# Patient Record
Sex: FEMALE | Race: WHITE | NOT HISPANIC OR LATINO | Employment: OTHER | URBAN - METROPOLITAN AREA
[De-identification: names, ages, dates, MRNs, and addresses within clinical notes are randomized per-mention and may not be internally consistent; named-entity substitution may affect disease eponyms.]

---

## 2017-01-04 ENCOUNTER — OFFICE VISIT (OUTPATIENT)
Dept: ENDOCRINOLOGY | Age: 77
End: 2017-01-04

## 2017-01-04 VITALS
WEIGHT: 231 LBS | DIASTOLIC BLOOD PRESSURE: 72 MMHG | BODY MASS INDEX: 39.44 KG/M2 | SYSTOLIC BLOOD PRESSURE: 130 MMHG | HEIGHT: 64 IN

## 2017-01-04 DIAGNOSIS — E11.22 TYPE 2 DIABETES MELLITUS WITH STAGE 3 CHRONIC KIDNEY DISEASE, WITH LONG-TERM CURRENT USE OF INSULIN (HCC): Primary | ICD-10-CM

## 2017-01-04 DIAGNOSIS — E78.5 HYPERLIPIDEMIA, UNSPECIFIED HYPERLIPIDEMIA TYPE: ICD-10-CM

## 2017-01-04 DIAGNOSIS — Z79.4 TYPE 2 DIABETES MELLITUS WITH STAGE 3 CHRONIC KIDNEY DISEASE, WITH LONG-TERM CURRENT USE OF INSULIN (HCC): Primary | ICD-10-CM

## 2017-01-04 DIAGNOSIS — E66.01 MORBID OBESITY WITH BMI OF 40.0-44.9, ADULT (HCC): ICD-10-CM

## 2017-01-04 DIAGNOSIS — N18.30 TYPE 2 DIABETES MELLITUS WITH STAGE 3 CHRONIC KIDNEY DISEASE, WITH LONG-TERM CURRENT USE OF INSULIN (HCC): Primary | ICD-10-CM

## 2017-01-04 PROCEDURE — 99214 OFFICE O/P EST MOD 30 MIN: CPT | Performed by: NURSE PRACTITIONER

## 2017-01-04 RX ORDER — MAGNESIUM OXIDE 400 MG/1
400 TABLET ORAL 2 TIMES DAILY
Refills: 11 | COMMUNITY
Start: 2016-12-14 | End: 2018-01-26

## 2017-01-04 RX ORDER — OMEPRAZOLE 40 MG/1
40 CAPSULE, DELAYED RELEASE ORAL DAILY
Refills: 1 | COMMUNITY
Start: 2016-10-27 | End: 2017-09-21 | Stop reason: ALTCHOICE

## 2017-01-04 NOTE — PROGRESS NOTES
"Subjective   Renuka Patel is a 76 y.o. female is here today for follow-up.  Chief Complaint   Patient presents with   • Diabetes     recent labs, did not bring meter, testing BG 4 to 5 times daily   • Hyperlipidemia     patient is taking Toujeo once or twice a week   • Hypertension   • Vitamin D Deficiency     Visit Vitals   • /72   • Ht 64\" (162.6 cm)   • Wt 231 lb (105 kg)   • BMI 39.65 kg/m2       Current Outpatient Prescriptions:   •  acetaminophen (TYLENOL) 325 MG tablet, Take 2 tablets by mouth every 6 (six) hours as needed for mild pain (1-3) or fever., Disp: 30 tablet, Rfl: 0  •  ADVAIR DISKUS 250-50 MCG/DOSE DISKUS, Inhale 1 puff 2 (two) times a day., Disp: , Rfl:   •  allopurinol (ZYLOPRIM) 300 MG tablet, Take 1 tablet by mouth Daily., Disp: , Rfl: 2  •  ALPRAZolam (XANAX) 0.5 MG tablet, Take 1 tablet by mouth as needed. Takes 1-2 tablets, Disp: , Rfl:   •  atorvastatin (LIPITOR) 20 MG tablet, Take 1 tablet by mouth Daily., Disp: 90 tablet, Rfl: 3  •  carvedilol (COREG) 25 MG tablet, Take 1/2 tablet twice daily, Disp: 90 tablet, Rfl: 3  •  colchicine 0.6 MG tablet, Take 1 tablet by mouth As Needed., Disp: , Rfl: 0  •  dabigatran etexilate (PRADAXA) 150 MG capsu, Take 1 capsule by mouth 2 (Two) Times a Day. Lot # [id number]   Exp 5/30/2018, Disp: 60 capsule, Rfl: 0  •  furosemide (LASIX) 20 MG tablet, Take 1 tablet by mouth 3 (Three) Times a Day., Disp: 270 tablet, Rfl: 1  •  glimepiride (AMARYL) 4 MG tablet, Take 1 tablet by mouth 2 (two) times a day., Disp: 180 tablet, Rfl: 3  •  Insulin Glargine 300 UNIT/ML solution pen-injector, Inject 20 Units under the skin Daily., Disp: 12 pen, Rfl: 3  •  Insulin Pen Needle 32G X 4 MM misc, , Disp: , Rfl:   •  magnesium oxide (MAG-OX) 400 MG tablet, Take 400 mg by mouth 2 (Two) Times a Day., Disp: , Rfl: 11  •  Melatonin 10 MG capsule, Take 1 capsule by mouth every night., Disp: , Rfl:   •  nitroglycerin (NITROSTAT) 0.4 MG SL tablet, Place under the tongue., Disp: " , Rfl:   •  omeprazole (priLOSEC) 40 MG capsule, Take 40 mg by mouth Daily., Disp: , Rfl: 1  •  PROAIR RESPICLICK 108 (90 BASE) MCG/ACT inhaler, , Disp: , Rfl:   •  tolnaftate (TINACTIN) 1 % powder, APPLY TO AFFECTED AREA TWICE DAILY, Disp: , Rfl: 0  •  vitamin D (ERGOCALCIFEROL) 99954 UNITS capsule capsule, Take 1 capsule by mouth 1 (one) time per week. (Patient taking differently: Take 50,000 Units by mouth 1 (one) time per week. On Friday), Disp: 13 capsule, Rfl: 3  •  guaiFENesin (MUCINEX) 600 MG 12 hr tablet, Take 2 tablets by mouth 2 (two) times a day. (Patient taking differently: Take 1,200 mg by mouth As Needed.), Disp: 30 tablet, Rfl: 0  •  traZODone (DESYREL) 100 MG tablet, Take 1 tablet by mouth As Needed., Disp: , Rfl: 3  Family History   Problem Relation Age of Onset   • Breast cancer Mother 60   • Breast cancer Sister 50   • Brain cancer Father 38   • Breast cancer Sister 40   • Heart attack Brother    • Diabetes Son    • Hypertension Son      Social History   Substance Use Topics   • Smoking status: Former Smoker     Packs/day: 1.00     Years: 25.00     Quit date: 2004   • Smokeless tobacco: Never Used   • Alcohol use No     Allergies   Allergen Reactions   • Codeine Itching   • Hydralazine    • Hydralazine Hcl    • Lisinopril Cough         History of Present Illness   Encounter Diagnoses   Name Primary?   • Hyperlipidemia, unspecified hyperlipidemia type    • Type 2 diabetes mellitus with stage 3 chronic kidney disease, with long-term current use of insulin Yes   • Morbid obesity with BMI of 40.0-44.9, adult        This is a 76-year-old female patient here today for a routine follow-up visit of the above-mentioned problems.  She had recent labs done which were reviewed.  She is complaining of her highest blood glucose reading in the 290 range.  Today when she woke up it was 90. States holidays have increased her need for insulin.  She states she is use to taking her toujeo insulin twice a week on an  as needed basis she is having problems with affording her medications and states the  Insulin will cost her over $300. Discussed rx assistance. Has [facility] coming to her house.  She states she has a son who is a pharmacist and she has been towed by him that no insulin is covered on her insurance formulary. Cannot afford meds and has applied for drug assistance. Is asking for samples today. Reviewed recent labs and was given a copy. a1c is not at goal. Not taking her insulin on a daily basis. Has appt today to see nephrologist.  I spent considerable time at today's visit educating the patient on the reason that she needs to take her basal insulin on a daily basis and not as needed.  The following portions of the patient's history were reviewed and updated as appropriate: allergies, current medications, past family history, past medical history, past social history, past surgical history and problem list.    Review of Systems   Constitutional: Negative for appetite change.   HENT: Negative for sneezing.    Eyes: Negative for redness.   Respiratory: Negative for cough.    Cardiovascular: Negative for leg swelling.   Gastrointestinal: Negative for anal bleeding.   Endocrine: Negative for polydipsia.   Genitourinary: Negative for menstrual problem.   Musculoskeletal: Negative for myalgias.   Skin: Negative for color change.   Allergic/Immunologic: Negative for food allergies.   Neurological: Negative for seizures.   Hematological: Negative for adenopathy.   Psychiatric/Behavioral: Negative for decreased concentration.       Objective   Physical Exam   Constitutional: She is oriented to person, place, and time. She appears well-developed and well-nourished. No distress.   HENT:   Head: Normocephalic and atraumatic.   Right Ear: External ear normal.   Left Ear: External ear normal.   Nose: Nose normal.   Mouth/Throat: Oropharynx is clear and moist. No oropharyngeal exudate.   Eyes: EOM are normal. Pupils are equal, round, and  reactive to light. Right eye exhibits no discharge. Left eye exhibits no discharge.   Neck: Trachea normal, normal range of motion and full passive range of motion without pain. Neck supple. No tracheal tenderness present. Carotid bruit is not present. No tracheal deviation, no edema and no erythema present. No thyroid mass and no thyromegaly present.   Cardiovascular: Normal rate, normal heart sounds and intact distal pulses.  Exam reveals no gallop and no friction rub.    No murmur heard.  Hx of atrial fib and pacemaker   Pulmonary/Chest: Effort normal and breath sounds normal. No stridor. No respiratory distress. She has no wheezes. She has no rales.   Abdominal: Soft. Bowel sounds are normal. She exhibits no distension.   Musculoskeletal: She exhibits no edema or deformity.   Slow and unsteady gait uses a cane to assist with walking   Lymphadenopathy:     She has no cervical adenopathy.   Neurological: She is alert and oriented to person, place, and time.   Skin: Skin is warm and dry. No rash noted. She is not diaphoretic. No erythema. No pallor.   Psychiatric: She has a normal mood and affect. Her behavior is normal. Judgment and thought content normal.   Nursing note and vitals reviewed.      Results for orders placed or performed in visit on 12/13/16   Ferritin   Result Value Ref Range    Ferritin 98.90 ng/mL   Iron profile   Result Value Ref Range    Iron 52 37 - 145 mcg/dL    Iron Saturation 17 14 - 48 %    Transferrin 222 200 - 360 mg/dL    TIBC 311 249 - 505 mcg/dL   CBC Auto Differential   Result Value Ref Range    WBC 7.41 4.00 - 10.00 10*3/mm3    RBC 3.69 (L) 3.90 - 5.00 10*6/mm3    Hemoglobin 11.1 (L) 11.5 - 14.9 g/dL    Hematocrit 35.7 34.0 - 45.0 %    MCV 96.7 83.0 - 97.0 fL    MCH 30.1 27.0 - 33.0 pg    MCHC 31.1 (L) 32.0 - 35.0 g/dL    RDW 16.1 (H) 11.7 - 14.5 %    RDW-SD 57.3 (H) 37.0 - 49.0 fl    MPV 10.8 8.9 - 12.1 fL    Platelets 150 150 - 375 10*3/mm3    Neutrophil % 71.3 39.0 - 75.0 %     Lymphocyte % 16.5 (L) 20.0 - 49.0 %    Monocyte % 7.6 4.0 - 12.0 %    Eosinophil % 3.5 1.0 - 5.0 %    Basophil % 0.7 0.0 - 1.1 %    Immature Grans % 0.4 0.0 - 0.5 %    Neutrophils, Absolute 5.29 1.50 - 7.00 10*3/mm3    Lymphocytes, Absolute 1.22 1.00 - 3.50 10*3/mm3    Monocytes, Absolute 0.56 0.25 - 0.80 10*3/mm3    Eosinophils, Absolute 0.26 0.00 - 0.36 10*3/mm3    Basophils, Absolute 0.05 0.00 - 0.10 10*3/mm3    Immature Grans, Absolute 0.03 0.00 - 0.03 10*3/mm3    nRBC 0.0 0.0 - 0.0 /100 WBC   Gold Top - SST   Result Value Ref Range    Extra Tube Hold for add-ons.        Assessment/Plan   Renuka was seen today for diabetes, hyperlipidemia, hypertension and vitamin d deficiency.    Diagnoses and all orders for this visit:    Type 2 diabetes mellitus with stage 3 chronic kidney disease, with long-term current use of insulin    Hyperlipidemia, unspecified hyperlipidemia type    Morbid obesity with BMI of 40.0-44.9, adult      In summary, patient was seen and examined.  I have changed her toujeo insulin to 20 units once daily instead of taking it on a when necessary basis.  She is to continue all her other medications as prescribed.  She is to follow-up with her nephrologist for her kidney disease in which she has an appointment today.  She was given some samples of toujeo and a written prescription to some into the company for prescription assistance.  She is to follow-up with Dr. Balbuena her next appointment with labs prior.  I've encouraged her to contact the office should she have any problems with her blood sugars understand to our for dropped below 70.

## 2017-01-13 RX ORDER — FUROSEMIDE 20 MG/1
20 TABLET ORAL 3 TIMES DAILY
Qty: 270 TABLET | Refills: 1 | Status: SHIPPED | OUTPATIENT
Start: 2017-01-13 | End: 2017-03-22 | Stop reason: SDUPTHER

## 2017-01-13 RX ORDER — CARVEDILOL 25 MG/1
TABLET ORAL
Qty: 135 TABLET | Refills: 3 | Status: SHIPPED | OUTPATIENT
Start: 2017-01-13 | End: 2017-09-18 | Stop reason: SDUPTHER

## 2017-01-20 ENCOUNTER — CLINICAL SUPPORT NO REQUIREMENTS (OUTPATIENT)
Dept: CARDIOLOGY | Facility: CLINIC | Age: 77
End: 2017-01-20

## 2017-01-20 DIAGNOSIS — I48.91 ATRIAL FIBRILLATION, UNSPECIFIED TYPE (HCC): Primary | ICD-10-CM

## 2017-01-20 PROCEDURE — 93294 REM INTERROG EVL PM/LDLS PM: CPT | Performed by: INTERNAL MEDICINE

## 2017-01-20 PROCEDURE — 93296 REM INTERROG EVL PM/IDS: CPT | Performed by: INTERNAL MEDICINE

## 2017-02-02 RX ORDER — DOXYCYCLINE 50 MG/1
TABLET ORAL
COMMUNITY
End: 2017-03-21

## 2017-02-02 RX ORDER — NITROGLYCERIN 0.4 MG/1
TABLET SUBLINGUAL
COMMUNITY
End: 2017-03-21 | Stop reason: SDUPTHER

## 2017-02-02 RX ORDER — OMEPRAZOLE 40 MG/1
CAPSULE, DELAYED RELEASE ORAL
COMMUNITY
End: 2017-03-21 | Stop reason: SDUPTHER

## 2017-02-02 RX ORDER — ALPRAZOLAM 0.5 MG/1
TABLET ORAL
COMMUNITY
End: 2017-02-09

## 2017-02-02 RX ORDER — DABIGATRAN ETEXILATE 150 MG/1
CAPSULE ORAL
COMMUNITY
End: 2017-02-09

## 2017-02-02 RX ORDER — MAGNESIUM OXIDE 400 MG/1
TABLET ORAL
COMMUNITY
End: 2017-02-09

## 2017-02-02 RX ORDER — DIPHENHYDRAMINE HCL 25 MG
TABLET ORAL
COMMUNITY
End: 2017-02-09

## 2017-02-02 RX ORDER — ACETAMINOPHEN 500 MG
TABLET ORAL
COMMUNITY
End: 2017-02-09

## 2017-02-07 ENCOUNTER — TELEPHONE (OUTPATIENT)
Dept: ENDOCRINOLOGY | Age: 77
End: 2017-02-07

## 2017-02-07 RX ORDER — DABIGATRAN ETEXILATE 150 MG/1
150 CAPSULE ORAL 2 TIMES DAILY
Qty: 60 CAPSULE | Refills: 0 | COMMUNITY
Start: 2017-02-07 | End: 2017-05-12 | Stop reason: SDUPTHER

## 2017-02-07 NOTE — TELEPHONE ENCOUNTER
----- Message from Janna Urbina sent at 2/7/2017 12:56 PM EST -----  Contact: patient  Patient is requesting samples of troujeo, could you give her a call when she can pick these up, she would like to pick these up Thursday    Gave 3 sample pens 02/07/2017

## 2017-02-09 ENCOUNTER — OFFICE VISIT (OUTPATIENT)
Dept: ONCOLOGY | Facility: CLINIC | Age: 77
End: 2017-02-09

## 2017-02-09 ENCOUNTER — LAB (OUTPATIENT)
Dept: LAB | Facility: HOSPITAL | Age: 77
End: 2017-02-09
Attending: INTERNAL MEDICINE

## 2017-02-09 ENCOUNTER — APPOINTMENT (OUTPATIENT)
Dept: ONCOLOGY | Facility: HOSPITAL | Age: 77
End: 2017-02-09

## 2017-02-09 VITALS
WEIGHT: 233.3 LBS | DIASTOLIC BLOOD PRESSURE: 84 MMHG | TEMPERATURE: 98.1 F | HEIGHT: 64 IN | OXYGEN SATURATION: 94 % | SYSTOLIC BLOOD PRESSURE: 130 MMHG | BODY MASS INDEX: 39.83 KG/M2 | HEART RATE: 79 BPM | RESPIRATION RATE: 14 BRPM

## 2017-02-09 DIAGNOSIS — D50.8 IRON DEFICIENCY ANEMIA REFRACTORY TO IRON THERAPY: Primary | ICD-10-CM

## 2017-02-09 DIAGNOSIS — N18.30 ANEMIA DUE TO STAGE 3 CHRONIC KIDNEY DISEASE TREATED WITH ERYTHROPOIETIN (HCC): ICD-10-CM

## 2017-02-09 DIAGNOSIS — D63.1 ANEMIA DUE TO STAGE 3 CHRONIC KIDNEY DISEASE TREATED WITH ERYTHROPOIETIN (HCC): ICD-10-CM

## 2017-02-09 DIAGNOSIS — D50.8 IRON DEFICIENCY ANEMIA REFRACTORY TO IRON THERAPY: ICD-10-CM

## 2017-02-09 LAB
BASOPHILS # BLD AUTO: 0.04 10*3/MM3 (ref 0–0.1)
BASOPHILS NFR BLD AUTO: 0.4 % (ref 0–1.1)
DEPRECATED RDW RBC AUTO: 54.4 FL (ref 37–49)
EOSINOPHIL # BLD AUTO: 0.17 10*3/MM3 (ref 0–0.36)
EOSINOPHIL NFR BLD AUTO: 1.6 % (ref 1–5)
ERYTHROCYTE [DISTWIDTH] IN BLOOD BY AUTOMATED COUNT: 15.3 % (ref 11.7–14.5)
FERRITIN SERPL-MCNC: 93.1 NG/ML
HCT VFR BLD AUTO: 36.1 % (ref 34–45)
HGB BLD-MCNC: 11.3 G/DL (ref 11.5–14.9)
IMM GRANULOCYTES # BLD: 0.04 10*3/MM3 (ref 0–0.03)
IMM GRANULOCYTES NFR BLD: 0.4 % (ref 0–0.5)
IRON 24H UR-MRATE: 67 MCG/DL (ref 37–145)
IRON SATN MFR SERPL: 21 % (ref 14–48)
LYMPHOCYTES # BLD AUTO: 1.25 10*3/MM3 (ref 1–3.5)
LYMPHOCYTES NFR BLD AUTO: 11.5 % (ref 20–49)
MCH RBC QN AUTO: 30.5 PG (ref 27–33)
MCHC RBC AUTO-ENTMCNC: 31.3 G/DL (ref 32–35)
MCV RBC AUTO: 97.3 FL (ref 83–97)
MONOCYTES # BLD AUTO: 0.59 10*3/MM3 (ref 0.25–0.8)
MONOCYTES NFR BLD AUTO: 5.4 % (ref 4–12)
NEUTROPHILS # BLD AUTO: 8.77 10*3/MM3 (ref 1.5–7)
NEUTROPHILS NFR BLD AUTO: 80.7 % (ref 39–75)
NRBC BLD MANUAL-RTO: 0 /100 WBC (ref 0–0)
PLATELET # BLD AUTO: 152 10*3/MM3 (ref 150–375)
PMV BLD AUTO: 10.5 FL (ref 8.9–12.1)
RBC # BLD AUTO: 3.71 10*6/MM3 (ref 3.9–5)
TIBC SERPL-MCNC: 315 MCG/DL (ref 249–505)
TRANSFERRIN SERPL-MCNC: 225 MG/DL (ref 200–360)
WBC NRBC COR # BLD: 10.86 10*3/MM3 (ref 4–10)

## 2017-02-09 PROCEDURE — 85025 COMPLETE CBC W/AUTO DIFF WBC: CPT

## 2017-02-09 PROCEDURE — 99214 OFFICE O/P EST MOD 30 MIN: CPT | Performed by: INTERNAL MEDICINE

## 2017-02-09 PROCEDURE — 36415 COLL VENOUS BLD VENIPUNCTURE: CPT

## 2017-02-09 PROCEDURE — 83540 ASSAY OF IRON: CPT

## 2017-02-09 PROCEDURE — 82728 ASSAY OF FERRITIN: CPT

## 2017-02-09 PROCEDURE — 84466 ASSAY OF TRANSFERRIN: CPT

## 2017-02-09 NOTE — PROGRESS NOTES
Subjective .     REASONS FOR FOLLOWUP:  Anemia, cancer    HISTORY OF PRESENT ILLNESS:  The patient is a 76 y.o. year old female  who is here for follow-up with the above-mentioned history.  Denies chest pain.  Denies shortness of air.  No change in baseline dizziness.  Denies weakness.  Denies bleeding from any location.  Denies nausea   Denies weight loss.  Denies pain.  Denies hematochezia or melena.    Denies any problems with bowel movements.        Past Medical History   Diagnosis Date   • Abdominal aortic aneurysm    • Acute combined systolic and diastolic congestive heart failure    • Anemia      Secondary to stage 3 chronic kidney disease.   • Anxiety    • Aortic aneurysm    • Atrial fibrillation    • Back pain    • Breast cancer      Stage I, status post lumpectomy   • CAD (coronary artery disease)    • Chronic kidney disease    • Colon cancer      Stage I, resected 01/27/2014   • Congestive heart failure    • COPD (chronic obstructive pulmonary disease)    • Coronary artery disease    • Diabetes mellitus      type 2   • Esophageal reflux    • Heart attack    • Hypertension    • Iron deficiency anemia secondary to inadequate dietary iron intake    • Joint pain      IN THE RIGHT KNEE   • Mass of adrenal gland      lesion solitary, CT thereof   • Morbid obesity    • Neoplasm of adrenal gland    • Obstructive sleep apnea    • HAYDEN on CPAP    • Osteoarthritis    • Pneumonia      onset date: 01 Mar 2011, Severe pneumonia w mechanical ventilation, [facility] at [facility]   • Radiation    • Sick sinus syndrome    • Strong family history of breast cancer    • Type 2 diabetes mellitus    • Vitamin D deficiency        HEMATOLOGIC/ONCOLOGIC HISTORY:  (History from previous dates can be found in the separate document.)    MEDICATIONS    Current Outpatient Prescriptions:   •  ADVAIR DISKUS 250-50 MCG/DOSE DISKUS, Inhale 1 puff 2 (two) times a day., Disp: , Rfl:   •  allopurinol (ZYLOPRIM) 300 MG tablet, Take 1  tablet by mouth Daily., Disp: , Rfl: 2  •  ALPRAZolam (XANAX) 0.5 MG tablet, Take 1 tablet by mouth as needed. Takes 1-2 tablets, Disp: , Rfl:   •  atorvastatin (LIPITOR) 20 MG tablet, Take 1 tablet by mouth Daily., Disp: 90 tablet, Rfl: 3  •  carvedilol (COREG) 25 MG tablet, Take 1/2 tablet twice a day.Take an extra 1/2 when needed., Disp: 135 tablet, Rfl: 3  •  colchicine 0.6 MG tablet, Take 1 tablet by mouth As Needed., Disp: , Rfl: 0  •  dabigatran etexilate (PRADAXA) 150 MG capsu, Take 1 capsule by mouth 2 (Two) Times a Day. Lot # [id number]   Exp 5/30/2018, Disp: 60 capsule, Rfl: 0  •  doxycycline (ADOXA) 50 MG tablet, QD, Disp: , Rfl:   •  furosemide (LASIX) 20 MG tablet, Take 1 tablet by mouth 3 (Three) Times a Day., Disp: 270 tablet, Rfl: 1  •  glimepiride (AMARYL) 4 MG tablet, Take 1 tablet by mouth 2 (two) times a day., Disp: 180 tablet, Rfl: 3  •  guaiFENesin (MUCINEX) 600 MG 12 hr tablet, Take 2 tablets by mouth 2 (two) times a day. (Patient taking differently: Take 1,200 mg by mouth As Needed.), Disp: 30 tablet, Rfl: 0  •  Insulin Glargine 300 UNIT/ML solution pen-injector, Inject 20 Units under the skin Daily., Disp: 12 pen, Rfl: 3  •  Insulin Pen Needle 32G X 4 MM misc, , Disp: , Rfl:   •  magnesium oxide (MAG-OX) 400 MG tablet, Take 400 mg by mouth 2 (Two) Times a Day., Disp: , Rfl: 11  •  Melatonin 10 MG capsule, Take 1 capsule by mouth every night., Disp: , Rfl:   •  Multiple Vitamins-Minerals (MULTIVITAMIN WOMEN 50+ PO), QD, Disp: , Rfl:   •  nitroglycerin (NITROSTAT) 0.4 MG SL tablet, Place under the tongue., Disp: , Rfl:   •  nitroglycerin (NITROSTAT) 0.4 MG SL tablet, prn, Disp: , Rfl:   •  omeprazole (priLOSEC) 40 MG capsule, Take 40 mg by mouth Daily., Disp: , Rfl: 1  •  omeprazole (priLOSEC) 40 MG capsule, qd, Disp: , Rfl:   •  PROAIR RESPICLICK 108 (90 BASE) MCG/ACT inhaler, , Disp: , Rfl:   •  tolnaftate (TINACTIN) 1 % powder, APPLY TO AFFECTED AREA TWICE DAILY, Disp: , Rfl: 0  •  vitamin D  (ERGOCALCIFEROL) 30841 UNITS capsule capsule, Take 1 capsule by mouth 1 (one) time per week. (Patient taking differently: Take 50,000 Units by mouth 1 (one) time per week. On Friday), Disp: 13 capsule, Rfl: 3    ALLERGIES:     Allergies   Allergen Reactions   • Codeine Itching     Edema   • Hydralazine Hcl      BP drops.   • Lisinopril Cough     Edema       SOCIAL HISTORY:       Social History     Social History   • Marital status:      Spouse name: N/A   • Number of children: N/A   • Years of education: N/A     Occupational History   • Retired      Social History Main Topics   • Smoking status: Former Smoker     Packs/day: 1.00     Years: 25.00     Quit date: 2004   • Smokeless tobacco: Never Used   • Alcohol use No   • Drug use: No   • Sexual activity: Defer     Other Topics Concern   • Not on file     Social History Narrative         FAMILY HISTORY:  Family History   Problem Relation Age of Onset   • Breast cancer Mother 60   • Breast cancer Sister 50   • Brain cancer Father 38   • Breast cancer Sister 40   • Heart attack Brother    • Diabetes Son    • Hypertension Son        REVIEW OF SYSTEMS:  GENERAL: No change in appetite or weight;   No fevers, chills, sweats.    SKIN: No nonhealing lesions.   No rashes.  HEME/LYMPH: No easy bruising, bleeding.   No swollen nodes.   EYES: No vision changes or diplopia.   ENT: No tinnitus, hearing loss, gum bleeding, epistaxis, hoarseness or dysphagia.   RESPIRATORY: No cough, shortness of breath, hemoptysis or wheezing.   CVS: No chest pain, palpitations, orthopnea, dyspnea on exertion or PND.   GI: No melena or hematochezia.   No abdominal pain.  No nausea, vomiting, constipation, diarrhea  : No lower tract obstructive symptoms, dysuria or hematuria.   MUSCULOSKELETAL: No bone pain.  No joint stiffness.   NEUROLOGICAL: see HPI   PSYCHIATRIC: No increased nervousness, mood changes or depression.         Objective    Vitals:    02/09/17 1033   BP: 130/84   Pulse: 79  "  Resp: 14   Temp: 98.1 °F (36.7 °C)   TempSrc: Oral   SpO2: 94%   Weight: 233 lb 4.8 oz (106 kg)   Height: 64\" (162.6 cm)   PainSc:   4   PainLoc: Knee     Current Status 2/9/2017   ECOG score 0      PHYSICAL EXAM:    GENERAL:  Well-developed, well-nourished in no acute distress.   SKIN:  Warm, dry without rashes, purpura or petechiae.  HEAD:  Normocephalic.  EYES:  Pupils equal, round and reactive to light.  EOMs intact.  Conjunctivae normal.  EARS:  Hearing intact.  NOSE:  Septum midline.  No excoriations or nasal discharge.  MOUTH:  Tongue is well-papillated; no stomatitis or ulcers.  Lips normal.  THROAT:  Oropharynx without lesions or exudates.  NECK:  Supple with good range of motion; no thyromegaly or masses, no JVD.  LYMPHATICS:  No cervical, supraclavicular, axillary or inguinal adenopathy.  CHEST:  Lungs clear to percussion and auscultation. Good airflow.  Declines breast exam  CARDIAC:  Regular rate and rhythm without murmurs, rubs or gallops. Normal S1,S2.  BREAST: Patient declined as she states she is up-to-date on breast exams through her PCP.  ABDOMEN:  Soft, nontender with no organomegaly or masses.  EXTREMITIES:  No clubbing, cyanosis or edema.  NEUROLOGICAL:  Cranial Nerves II-XII grossly intact.  No focal neurological deficits.  PSYCHIATRIC:  Normal affect and mood.      RECENT LABS:        WBC   Date/Time Value Ref Range Status   02/09/2017 10:29 AM 10.86 (H) 4.00 - 10.00 10*3/mm3 Final   12/21/2015 05:07 PM 9.72 4.50 - 10.70 K/Cumm Final     HEMOGLOBIN   Date/Time Value Ref Range Status   02/09/2017 10:29 AM 11.3 (L) 11.5 - 14.9 g/dL Final   12/21/2015 05:07 PM 10.2 (L) 11.9 - 15.5 g/dL Final     PLATELETS   Date/Time Value Ref Range Status   02/09/2017 10:29  150 - 375 10*3/mm3 Final   12/21/2015 05:07  140 - 500 K/Cumm Final       Assessment/Plan   Iron deficiency anemia refractory to iron therapy  - CBC & Differential  - CBC & Differential  - CBC & Differential  - Ferritin  - " Iron Profile  - Ferritin  - Iron Profile  - Ferritin  - Iron Profile    Anemia due to stage 3 chronic kidney disease treated with erythropoietin  - CBC & Differential  - CBC & Differential  - CBC & Differential  - Ferritin  - Iron Profile  - Ferritin  - Iron Profile  - Ferritin  - Iron Profile      ASSESSMENT:  1.  Iron deficiency anemia. Intolerant and does not respond to oral iron, has used Feraheme. She follows with Dr. Gautam   Recent iron studies normal.    2. Anemia secondary to stage 3 chronic kidney disease. Procrit started 11/03/2015.   Has not needed Procrit recently.    3. Stage I breast cancer. She completed 5 years of tamoxifen in 2005.   Last mammogram 08/18/2015, BIRADS 2.     4. Stage I colon adenocarcinom a, resected 01/27/2014. No adjuvant chemotherapy indicated. Follows with Dr. Gautam regularly.     5. Difficult IV stick. She states she has lots of issues with this. I told her at some point she may need a port to have access for IV infusions and blood trans fusions. We discussed some of the risks with this. For now, we will hold off, but if the IV sticks become more difficult, we may need to reconsider this.     6.  Intermittent yeast infection at the folds of her skin under her breasts bilaterally, intermittently. She thinks nystatin cream works better than nystatin powder. She uses Desitin as well.     7. Chronic kidney disease. Creatinine 0.8 and 1.2 on lab checks from 2013. Creatinine was noted to be 1.8 on 10/08/2015 and 11/03/2015. She is now seeing Dr. Isha Kraus of Nephrology for further management of this as well as her hyperkalemia and blood pressure issues.     PLAN:   · CBC, iron labs q 2 months  · Procrit if Hb < 10.   · M.D./labs/Procrit 6 months   · Defer further BMP assessments to her other providers.   · Last mammogram 8/18/16, BIRADS 1.

## 2017-02-16 RX ORDER — NITROGLYCERIN 0.4 MG/1
0.4 TABLET SUBLINGUAL
Qty: 20 TABLET | Refills: 0 | Status: SHIPPED | OUTPATIENT
Start: 2017-02-16 | End: 2018-02-28 | Stop reason: SDUPTHER

## 2017-03-21 ENCOUNTER — CLINICAL SUPPORT NO REQUIREMENTS (OUTPATIENT)
Dept: CARDIOLOGY | Facility: CLINIC | Age: 77
End: 2017-03-21

## 2017-03-21 ENCOUNTER — OFFICE VISIT (OUTPATIENT)
Dept: CARDIOLOGY | Facility: CLINIC | Age: 77
End: 2017-03-21

## 2017-03-21 VITALS
SYSTOLIC BLOOD PRESSURE: 112 MMHG | HEART RATE: 69 BPM | DIASTOLIC BLOOD PRESSURE: 78 MMHG | BODY MASS INDEX: 40.29 KG/M2 | WEIGHT: 236 LBS | HEIGHT: 64 IN

## 2017-03-21 DIAGNOSIS — I42.9 CARDIOMYOPATHY (HCC): ICD-10-CM

## 2017-03-21 DIAGNOSIS — I34.0 NON-RHEUMATIC MITRAL REGURGITATION: ICD-10-CM

## 2017-03-21 DIAGNOSIS — I48.21 PERMANENT ATRIAL FIBRILLATION (HCC): Primary | ICD-10-CM

## 2017-03-21 DIAGNOSIS — I48.21 PERMANENT ATRIAL FIBRILLATION (HCC): ICD-10-CM

## 2017-03-21 DIAGNOSIS — I10 ESSENTIAL HYPERTENSION: ICD-10-CM

## 2017-03-21 DIAGNOSIS — G47.33 OSA (OBSTRUCTIVE SLEEP APNEA): ICD-10-CM

## 2017-03-21 DIAGNOSIS — I25.10 CHRONIC CORONARY ARTERY DISEASE: Primary | ICD-10-CM

## 2017-03-21 DIAGNOSIS — E78.5 HYPERLIPIDEMIA, UNSPECIFIED HYPERLIPIDEMIA TYPE: ICD-10-CM

## 2017-03-21 DIAGNOSIS — J44.9 CHRONIC OBSTRUCTIVE PULMONARY DISEASE, UNSPECIFIED COPD TYPE (HCC): ICD-10-CM

## 2017-03-21 PROCEDURE — 99214 OFFICE O/P EST MOD 30 MIN: CPT | Performed by: INTERNAL MEDICINE

## 2017-03-21 PROCEDURE — 93279 PRGRMG DEV EVAL PM/LDLS PM: CPT | Performed by: INTERNAL MEDICINE

## 2017-03-21 NOTE — PROGRESS NOTES
Date of Office Visit: 2017  Encounter Provider: Rhiannon Rios MD  Place of Service: [facility] CARDIOLOGY  Patient Name: Renuka Patel  :1940      Patient ID:  Renuka Patel is a 76 y.o. female is here for  followup for CAD and CMP and a fib.         History of Present Illness    I first saw her in 2010 for atrial fibrillation which was new  onset. We attempted a cardioversion but she developed severe bradycardia  after that, and required a pacemaker implantation which was performed on  2011. The second attempt to cardiovert her in 2011 was unsuccessful.    She has been on Coumadin and rate controlled since that time.    She was admitted to [facility] for acute respiratory  failure on 2011 requiring mechanical ventilation secondary to  severe pneumonia. She went into congestive heart failure and atrial  fibrillation with rapid ventricular response. The hospitalization was long  and complex.       After she recovered she had a stress study to rule out ischemia as  a reason for her heart failure. This was performed on 2011 and  was a two day nuclear myocardial perfusion study and was normal. She had an  echocardiogram performed on 2012 which showed normal left  ventricular systolic function, severe biatrial enlargement, right  ventricular enlargement, mild mitral insufficiency, and indeterminate  diastolic dysfunction.       I then saw her in the spring of 2012. She was having episodes of dyspnea on  exertion as well as lightheadedness and a lot of fatigue. As it turns out,  by interrogating her pacemaker, we found episodes of atrial fibrillation  with rapid ventricular response. She underwent AV node ablation for atrial  fibrillation with rapid ventricular response on 2012 with Dr. Bear Rodrigues and she did great after that. She became ill however; and  presented to [facility] on  07/21/2012. She had  accelerated hypertension and pulmonary edema. She was diuresed and her  blood pressure medications were adjusted.       During her hospitalization in 2012, she lost 15 pounds. She had a CT scan performed  which showed a left adrenal mass at 3.5 cm and a small abdominal aortic  aneurysm which was infrarenal and measured 3.3 cm. She was seen by  endocrinology, Dr. Felton, and cardiology while she was there. She had an  echocardiogram performed on 07/20/2012 which showed her ejection fraction  moderately reduced at 30-35% with septal akinesis, inferior wall akinesis,  left atrium was mildly dilated, the right atrium was mildly dilated, mild  mitral insufficiency and mild tricuspid insufficiency. This was clearly a  change from her prior echocardiogram.           On 11/12/2013, she had a colonoscopy with Dr. Gautam, which showed an ulcerated mass in the  sigmoid colon, which turned out to be positive for colon cancer and now she is scheduled  to see Dr. Nathan Bernal the second week in December. After her colonoscopy then, on  11/17/2013, she presented to [facility] with prolonged shoulder  discomfort and wound up ruling in for a non-ST elevation myocardial infarction. She went  on for cardiac catheterization with Dr. Mayfield. This showed normal left main coronary  artery, normal non-dominant left circumflex vessel, 99% proximal LAD stenosis going into  the mid-LAD involving the origin of the second diagonal branch with a 75% ostial second  diagonal stenosis. The first diagonal branch was normal. The right coronary artery showed  50% at the posterolateral arteries. Her ejection fraction was reduced at 35-40%. She went  on to receive a 2.25 x 15 mm drug-eluting stent to the second diagonal branch of the left  anterior descending artery and then she received a 2.75 x 18 mm long Xience drug-eluting  stent into the midleft anterior descending artery. Then she received a 3.5 x 23 mm  long  Xience drug-eluting stent in the proximal left anterior descending artery. Afterwards, she  did well and was able to be discharged, but in the meantime, she still has this diagnosis  of colon cancer and she is on three anticoagulation medications; aspirin, Effient, and  Pradaxa.       She had an echocardiogram on 2013 which revealed an ejection  fraction of 53%, apical wall hypokinesis and moderate mitral insufficiency,  moderate-to-severe tricuspid insufficiency and grade III diastolic dysfunction. She also  had a PET stress study done on 2013 which revealed a moderate area of myocardial  infarction in the apical wall with no ischemia. There was apical akinesis and the  ejection fraction was 36%. She had a CT of the abdomen and pelvis on 2014 showing  no metastatic disease, sigmoid thickening with malignancy there and a 3.6 cm infrarenal  abdominal aortic aneurysm. She has an adrenal adenoma which really is unchanged.    Her  Claudio  around ma2013 and this has been very  difficult for her.       She is status post her colon surgery which was done for colon cancer with Dr. Bernal 2014.  At a semiurgent visit on 2014, she was  short-winded and had edema. It turned out she was quite anemic, and her blood pressure  was low so we decreased her Diovan to 80 mg daily and stopped her Effient.       She saw Dr. Reza for her sleep apnea. They  decreased the pressures on her CPAP mask so she is able to wear it for more than three  hours a night.      She was admitted on 2014, with heart  failure and hypertensive emergency. She was diuresed and did much better. She had a  chest x-ray during the hospitalization, which did show heart failure. She had a 2-D  echocardiogram with Doppler showing an ejection fraction of 40%-45%, apical and mid distal  anterior wall severe hypokinesis, moderate to severe right atrial dilation, severe left  atrial dilation, mild aortic  insufficiency, moderate mitral insufficiency, right  ventricular systolic pressure of 47 mmHg, and moderate tricuspid insufficiency.       She has developed an abdominal wall hernia but it is not causing her any pain, so she is going to wear a binder for now.  The patient was admitted to the hospital at [facility] from 11/07/2014 to  11/12/2014 with acute congestive heart failure. This was systolic in nature. Her blood  pressure was elevated. She was hypoxic. She required BiPAP and diuretics. After  adjusting her medications for her blood pressure and using diuretics she came back to  Baseline.      She had an echocardiogram last done in 04/2015. This showed right ventricular systolic pressure  elevated at 54 mmHg, mild tricuspid insufficiency, grade 2 diastolic dysfunction, moderate  left ventricular dilation, mild concentric left ventricular hypertrophy, ejection fraction  of 54% with basilar lateral and basilar anterior akinesis and aneurysm. There was severe  biatrial enlargement and mild to moderate mitral insufficiency.       She was admitted on 08/24/2016, with acute decompensating congestive heart failure, both systolic and diastolic in nature. She had an echocardiogram, which showed her ejection fraction had dropped to 32%. She was treated with IV diuretics. She was very weak and so physical therapy was also consulted on the case. She ruled out for myocardial infarction but did not have a stress study done during the hospitalization because she needed a PET stress study and the PET camera was in repair.     She had a PET stress test done 09/2016 showing ejection fraction of 37% and no evidence of ischemia.      The patient is here today and she is overall doing well.  She has some chronic fatigue due to anemia and some lightheadedness when she takes her Lasix, which she thinks is multifactorial.  She has had some sinus drainage and is on Allegra D.  Her weight has not changed.  She is not  exercising because of right knee pain.  She has no exertional chest tightness or pressure.  She has no difficulty breathing.  She has no lower extremity edema and nothing that sounds like orthopnea or paroxysmal nocturnal dyspnea.  Her blood pressure usually chronically remains on the low side.  She has had no falls or syncope.  Her pacemaker check today shows that her pacemaker is functioning well.          Past Medical History   Diagnosis Date   • Abdominal aortic aneurysm    • Acute combined systolic and diastolic congestive heart failure    • Anemia      Secondary to stage 3 chronic kidney disease.   • Anxiety    • Aortic aneurysm    • Atrial fibrillation    • Back pain    • Breast cancer      Stage I, status post lumpectomy   • CAD (coronary artery disease)    • Cardiomyopathy    • CHF (congestive heart failure)    • Chronic kidney disease    • Colon cancer      Stage I, resected 01/27/2014   • Common cold    • Congestive heart failure    • COPD (chronic obstructive pulmonary disease)    • Coronary artery disease    • Diabetes mellitus      type 2   • Esophageal reflux    • Heart attack    • Hyperlipidemia    • Hypertension    • Iron deficiency anemia secondary to inadequate dietary iron intake    • Joint pain      IN THE RIGHT KNEE   • Mass of adrenal gland      lesion solitary, CT thereof   • Morbid obesity    • Neoplasm of adrenal gland    • Obstructive sleep apnea    • HAYDEN on CPAP    • Osteoarthritis    • Pneumonia      onset date: 01 Mar 2011, Severe pneumonia w mechanical ventilation, treated at [facility]. Patricia Britton   • Radiation    • Sick sinus syndrome    • Strong family history of breast cancer    • Type 2 diabetes mellitus    • Vitamin D deficiency          Past Surgical History   Procedure Laterality Date   • Hysterectomy     • Back surgery     • Hand surgery     • Cardiac pacemaker placement     • Breast surgery     • Breast lumpectomy     • Breast biopsy     • Tibia fracture surgery       left   •  Toe surgery       bilat all toenails removed x3 r/t fungus   • Colon surgery  01/27/2014     resection for colon cancer       Current Outpatient Prescriptions on File Prior to Visit   Medication Sig Dispense Refill   • ADVAIR DISKUS 250-50 MCG/DOSE DISKUS Inhale 1 puff 2 (two) times a day.     • allopurinol (ZYLOPRIM) 300 MG tablet Take 1 tablet by mouth Daily.  2   • ALPRAZolam (XANAX) 0.5 MG tablet Take 1 tablet by mouth as needed. Takes 1-2 tablets     • atorvastatin (LIPITOR) 20 MG tablet Take 1 tablet by mouth Daily. 90 tablet 3   • carvedilol (COREG) 25 MG tablet Take 1/2 tablet twice a day.Take an extra 1/2 when needed. 135 tablet 3   • colchicine 0.6 MG tablet Take 1 tablet by mouth As Needed.  0   • dabigatran etexilate (PRADAXA) 150 MG capsu Take 1 capsule by mouth 2 (Two) Times a Day. Lot # [id number]   Exp 5/30/2018 60 capsule 0   • furosemide (LASIX) 20 MG tablet Take 1 tablet by mouth 3 (Three) Times a Day. (Patient taking differently: Take 20 mg by mouth. Take 2 po in the am and 3 po in the pm) 270 tablet 1   • glimepiride (AMARYL) 4 MG tablet Take 1 tablet by mouth 2 (two) times a day. 180 tablet 3   • Insulin Glargine 300 UNIT/ML solution pen-injector Inject 20 Units under the skin Daily. 12 pen 3   • Insulin Pen Needle 32G X 4 MM misc      • magnesium oxide (MAG-OX) 400 MG tablet Take 400 mg by mouth 2 (Two) Times a Day.  11   • Melatonin 10 MG capsule Take 1 capsule by mouth every night.     • Multiple Vitamins-Minerals (MULTIVITAMIN WOMEN 50+ PO) QD     • nitroglycerin (NITROSTAT) 0.4 MG SL tablet Place 1 tablet under the tongue Every 5 (Five) Minutes As Needed for chest pain. 20 tablet 0   • omeprazole (priLOSEC) 40 MG capsule Take 40 mg by mouth Daily.  1   • PROAIR RESPICLICK 108 (90 BASE) MCG/ACT inhaler Every 6 (Six) Hours As Needed.     • tolnaftate (TINACTIN) 1 % powder APPLY TO AFFECTED AREA TWICE DAILY as needed  0   • [DISCONTINUED] doxycycline (ADOXA) 50 MG tablet QD     • [DISCONTINUED]  guaiFENesin (MUCINEX) 600 MG 12 hr tablet Take 2 tablets by mouth 2 (two) times a day. (Patient taking differently: Take 1,200 mg by mouth As Needed.) 30 tablet 0   • [DISCONTINUED] nitroglycerin (NITROSTAT) 0.4 MG SL tablet prn     • [DISCONTINUED] omeprazole (priLOSEC) 40 MG capsule qd     • [DISCONTINUED] vitamin D (ERGOCALCIFEROL) 71619 UNITS capsule capsule Take 1 capsule by mouth 1 (one) time per week. (Patient taking differently: Take 50,000 Units by mouth 1 (one) time per week. On Friday) 13 capsule 3     No current facility-administered medications on file prior to visit.        Social History     Social History   • Marital status:      Spouse name: N/A   • Number of children: N/A   • Years of education: N/A     Occupational History   • Retired      Social History Main Topics   • Smoking status: Former Smoker     Packs/day: 1.00     Years: 25.00     Quit date: 2004   • Smokeless tobacco: Never Used      Comment: Daily caffeine use   • Alcohol use Yes      Comment: seldom   • Drug use: No   • Sexual activity: Defer     Other Topics Concern   • Not on file     Social History Narrative           Review of Systems   Constitution: Negative.   HENT: Negative for congestion and headaches.    Eyes: Negative for vision loss in left eye and vision loss in right eye.   Respiratory: Negative.  Negative for cough, hemoptysis, shortness of breath, sleep disturbances due to breathing, snoring, sputum production and wheezing.    Endocrine: Negative.    Hematologic/Lymphatic: Negative.    Skin: Negative for poor wound healing and rash.   Musculoskeletal: Negative for falls, gout, muscle cramps and myalgias.   Gastrointestinal: Negative for abdominal pain, diarrhea, dysphagia, hematemesis, melena, nausea and vomiting.   Neurological: Negative for excessive daytime sleepiness, dizziness, light-headedness, loss of balance, seizures and vertigo.   Psychiatric/Behavioral: Negative for depression and substance abuse. The  "patient is not nervous/anxious.        Procedures  Procedures       Objective:      Vitals:    03/21/17 1051   BP: 112/78   Pulse: 69   Weight: 236 lb (107 kg)   Height: 64\" (162.6 cm)     Body mass index is 40.51 kg/(m^2).    Physical Exam   Constitutional: She is oriented to person, place, and time. She appears well-developed and well-nourished. No distress.   obese   HENT:   Head: Normocephalic and atraumatic.   Eyes: Conjunctivae are normal. No scleral icterus.   Neck: Neck supple. No JVD present. Carotid bruit is not present. No thyromegaly present.   Cardiovascular: Normal rate, regular rhythm, S1 normal, S2 normal, normal heart sounds and intact distal pulses.   No extrasystoles are present. PMI is not displaced.    No murmur heard.  Pulses:       Carotid pulses are 2+ on the right side, and 2+ on the left side.       Radial pulses are 2+ on the right side, and 2+ on the left side.        Dorsalis pedis pulses are 2+ on the right side, and 2+ on the left side.        Posterior tibial pulses are 2+ on the right side, and 2+ on the left side.   Pulmonary/Chest: Effort normal and breath sounds normal. No respiratory distress. She has no wheezes. She has no rhonchi. She has no rales. She exhibits no tenderness.   Abdominal: Soft. Bowel sounds are normal. She exhibits no distension, no abdominal bruit, no ascites and no mass. There is no tenderness.   Musculoskeletal: She exhibits no edema or deformity.   Lymphadenopathy:     She has no cervical adenopathy.   Neurological: She is alert and oriented to person, place, and time. No cranial nerve deficit.   Skin: Skin is warm and dry. No rash noted. She is not diaphoretic. No cyanosis. No pallor. Nails show no clubbing.   Psychiatric: She has a normal mood and affect. Judgment normal.   Vitals reviewed.      Lab Review:       Assessment:      Diagnosis Plan   1. Chronic coronary artery disease     2. Permanent atrial fibrillation     3. Cardiomyopathy     4. " Hyperlipidemia, unspecified hyperlipidemia type     5. Essential hypertension     6. Non-rheumatic mitral regurgitation     7. HAYDEN (obstructive sleep apnea)     8. Chronic obstructive pulmonary disease, unspecified COPD type       1. History of congestive heart failure, resolved. These episodes have been due to    hypertension. In hospital 8/2016 for CHF. No CHF now.    3. Hypertension. Blood pressure is well controlled at home..  4. Adrenal mass, 3.5 cm, followed by Dr. Felton.    5. Small abdominal aortic aneurysm, 3.6 cm by CTA 01/20/2014.    6. Chronic atrial fibrillation status post AV node ablation with pacemaker. Stay on Pradaxa 150 mg twice daily with food.  7. Diabetes mellitus type 2. Followed by Dr. Balbuena.  8. Morbid obesity. Is trying to lose weight.  9. Gastroesophageal reflux disease.    10. Chronic obstructive pulmonary disease.    11. History of breast cancer with lumpectomy on the right breast.    12. Obstructive sleep apnea on CPAP.    13. Anxiety.    14. Dizziness worse with anemia.  15. High social stressors.    16. Colon cancer. Dr. Joaquim Bernal colectomy surgery 01/27/2014.    17. NSTEMI 11/17/13 with cardiomyopathy LVEF 35-40%, status post LAD and 2nd diagonal stents  at [facility] with Dr. Mayfield. Echocardiogram 05/2014 showed LVEF about 40%, apical hypokinesis.    LVEF 54% on echocardiogram 04/2015, 30% on 8/2016.   18. Anemia, sees Dr. Hart. Her upper and lower endoscopy with Dr. Bernal in 2015 were normal. This is under better control now.   19. CKD, sees Chata Gerardo.      Plan:       See back in 6 months, no changes.  She is lightheaded because of the Lasix, but I do not want to change it because she has been staying out of heart failure.  She is not on an angiotensin receptor blocker or an ACE inhibitor because of renal insufficiency and hypotension.          Coronary Artery Disease  Assessment  • The patient has no angina    Subjective - Objective  • There is a history of  past MI  • There has been a previous stent procedure using SERGIO        Heart Failure  Assessment  • NYHA class III-A - There is limitation of physical activity. The patient is comfortable at rest, but ordinary activity causes fatigue, palpitations or shortness of breath.  • Beta blocker prescribed  • Diuretics prescribed  • Left ventricular function is moderately reduced by qualitative assessment    Plan  • The heart failure care plan was discussed with the patient today including: continuing the current program    Subjective/Objective    • Physical exam findings negative for rales, peripheral edema, elevated JVP and ascites.      Atrial Fibrillation and Atrial Flutter  Assessment  • The patient has permanent atrial fibrillation  • This is non-valvular in etiology  • The patient's CHADS2-VASc score is 7  • A [id number] score of 2 or more is considered a high risk for a thromboembolic event  • Dabigatran prescribed    Plan  • Continue in atrial fibrillation with rate control  • Continue dabigatran for antithrombotic therapy, bleeding issues discussed  • Continue beta blocker for rate control

## 2017-03-22 RX ORDER — FUROSEMIDE 20 MG/1
TABLET ORAL
Qty: 270 TABLET | Refills: 1 | Status: SHIPPED | OUTPATIENT
Start: 2017-03-22 | End: 2017-08-08 | Stop reason: SDUPTHER

## 2017-03-25 DIAGNOSIS — N18.30 TYPE 2 DIABETES MELLITUS WITH STAGE 3 CHRONIC KIDNEY DISEASE, WITH LONG-TERM CURRENT USE OF INSULIN (HCC): ICD-10-CM

## 2017-03-25 DIAGNOSIS — E78.5 HYPERLIPIDEMIA, UNSPECIFIED HYPERLIPIDEMIA TYPE: Primary | ICD-10-CM

## 2017-03-25 DIAGNOSIS — E11.22 TYPE 2 DIABETES MELLITUS WITH STAGE 3 CHRONIC KIDNEY DISEASE, WITH LONG-TERM CURRENT USE OF INSULIN (HCC): ICD-10-CM

## 2017-03-25 DIAGNOSIS — Z79.4 TYPE 2 DIABETES MELLITUS WITH STAGE 3 CHRONIC KIDNEY DISEASE, WITH LONG-TERM CURRENT USE OF INSULIN (HCC): ICD-10-CM

## 2017-03-25 DIAGNOSIS — E55.9 VITAMIN D DEFICIENCY: Primary | ICD-10-CM

## 2017-03-28 ENCOUNTER — LAB (OUTPATIENT)
Dept: ENDOCRINOLOGY | Age: 77
End: 2017-03-28

## 2017-03-28 ENCOUNTER — CONVERSION ENCOUNTER (OUTPATIENT)
Dept: ENDOCRINOLOGY | Age: 77
End: 2017-03-28

## 2017-03-28 DIAGNOSIS — E11.22 TYPE 2 DIABETES MELLITUS WITH STAGE 3 CHRONIC KIDNEY DISEASE, WITH LONG-TERM CURRENT USE OF INSULIN (HCC): ICD-10-CM

## 2017-03-28 DIAGNOSIS — E55.9 VITAMIN D DEFICIENCY: ICD-10-CM

## 2017-03-28 DIAGNOSIS — E78.5 HYPERLIPIDEMIA, UNSPECIFIED HYPERLIPIDEMIA TYPE: ICD-10-CM

## 2017-03-28 DIAGNOSIS — Z79.4 TYPE 2 DIABETES MELLITUS WITH STAGE 3 CHRONIC KIDNEY DISEASE, WITH LONG-TERM CURRENT USE OF INSULIN (HCC): ICD-10-CM

## 2017-03-28 DIAGNOSIS — N18.30 TYPE 2 DIABETES MELLITUS WITH STAGE 3 CHRONIC KIDNEY DISEASE, WITH LONG-TERM CURRENT USE OF INSULIN (HCC): ICD-10-CM

## 2017-03-29 LAB
25(OH)D3+25(OH)D2 SERPL-MCNC: 53.2 NG/ML (ref 30–100)
ALBUMIN SERPL-MCNC: 4.3 G/DL (ref 3.5–5.2)
ALBUMIN/GLOB SERPL: 2 G/DL
ALP SERPL-CCNC: 108 U/L (ref 39–117)
ALT SERPL-CCNC: 11 U/L (ref 1–33)
AST SERPL-CCNC: 17 U/L (ref 1–32)
BILIRUB SERPL-MCNC: 0.3 MG/DL (ref 0.1–1.2)
BUN SERPL-MCNC: 38 MG/DL (ref 8–23)
BUN/CREAT SERPL: 25.5 (ref 7–25)
C PEPTIDE SERPL-MCNC: 4.4 NG/ML (ref 1.1–4.4)
CALCIUM SERPL-MCNC: 9.3 MG/DL (ref 8.6–10.5)
CHLORIDE SERPL-SCNC: 95 MMOL/L (ref 98–107)
CHOLEST SERPL-MCNC: 136 MG/DL (ref 0–200)
CO2 SERPL-SCNC: 29.8 MMOL/L (ref 22–29)
CREAT SERPL-MCNC: 1.49 MG/DL (ref 0.57–1)
GLOBULIN SER CALC-MCNC: 2.2 GM/DL
GLUCOSE SERPL-MCNC: 235 MG/DL (ref 65–99)
HBA1C MFR BLD: 7.94 % (ref 4.8–5.6)
HDLC SERPL-MCNC: 49 MG/DL (ref 40–60)
LDLC SERPL CALC-MCNC: 54 MG/DL (ref 0–100)
MICROALBUMIN UR-MCNC: 21.8 UG/ML
POTASSIUM SERPL-SCNC: 4.6 MMOL/L (ref 3.5–5.2)
PROT SERPL-MCNC: 6.5 G/DL (ref 6–8.5)
SODIUM SERPL-SCNC: 140 MMOL/L (ref 136–145)
TRIGL SERPL-MCNC: 165 MG/DL (ref 0–150)
URATE SERPL-MCNC: 4.6 MG/DL (ref 2.4–5.7)
VLDLC SERPL CALC-MCNC: 33 MG/DL (ref 5–40)

## 2017-04-04 ENCOUNTER — OFFICE VISIT (OUTPATIENT)
Dept: ENDOCRINOLOGY | Age: 77
End: 2017-04-04

## 2017-04-04 ENCOUNTER — LAB (OUTPATIENT)
Dept: LAB | Facility: HOSPITAL | Age: 77
End: 2017-04-04

## 2017-04-04 ENCOUNTER — APPOINTMENT (OUTPATIENT)
Dept: ONCOLOGY | Facility: HOSPITAL | Age: 77
End: 2017-04-04

## 2017-04-04 VITALS
DIASTOLIC BLOOD PRESSURE: 82 MMHG | BODY MASS INDEX: 39.42 KG/M2 | SYSTOLIC BLOOD PRESSURE: 124 MMHG | WEIGHT: 236.6 LBS | RESPIRATION RATE: 16 BRPM | HEIGHT: 65 IN

## 2017-04-04 DIAGNOSIS — I10 ESSENTIAL HYPERTENSION: ICD-10-CM

## 2017-04-04 DIAGNOSIS — D63.1 ANEMIA DUE TO STAGE 3 CHRONIC KIDNEY DISEASE TREATED WITH ERYTHROPOIETIN (HCC): ICD-10-CM

## 2017-04-04 DIAGNOSIS — E55.9 VITAMIN D DEFICIENCY: ICD-10-CM

## 2017-04-04 DIAGNOSIS — E66.01 MORBID OBESITY WITH BMI OF 40.0-44.9, ADULT (HCC): ICD-10-CM

## 2017-04-04 DIAGNOSIS — IMO0002 UNCONTROLLED TYPE 2 DIABETES MELLITUS WITH COMPLICATION, WITH LONG-TERM CURRENT USE OF INSULIN: ICD-10-CM

## 2017-04-04 DIAGNOSIS — E78.5 HYPERLIPIDEMIA, UNSPECIFIED HYPERLIPIDEMIA TYPE: ICD-10-CM

## 2017-04-04 DIAGNOSIS — N18.30 ANEMIA DUE TO STAGE 3 CHRONIC KIDNEY DISEASE TREATED WITH ERYTHROPOIETIN (HCC): ICD-10-CM

## 2017-04-04 DIAGNOSIS — I25.10 CHRONIC CORONARY ARTERY DISEASE: Primary | ICD-10-CM

## 2017-04-04 DIAGNOSIS — D50.8 IRON DEFICIENCY ANEMIA REFRACTORY TO IRON THERAPY: ICD-10-CM

## 2017-04-04 LAB
BASOPHILS # BLD AUTO: 0.04 10*3/MM3 (ref 0–0.1)
BASOPHILS NFR BLD AUTO: 0.5 % (ref 0–1.1)
DEPRECATED RDW RBC AUTO: 55.8 FL (ref 37–49)
EOSINOPHIL # BLD AUTO: 0.14 10*3/MM3 (ref 0–0.36)
EOSINOPHIL NFR BLD AUTO: 1.6 % (ref 1–5)
ERYTHROCYTE [DISTWIDTH] IN BLOOD BY AUTOMATED COUNT: 15.7 % (ref 11.7–14.5)
FERRITIN SERPL-MCNC: 81.2 NG/ML
HCT VFR BLD AUTO: 35.4 % (ref 34–45)
HGB BLD-MCNC: 11 G/DL (ref 11.5–14.9)
IMM GRANULOCYTES # BLD: 0.03 10*3/MM3 (ref 0–0.03)
IMM GRANULOCYTES NFR BLD: 0.3 % (ref 0–0.5)
IRON 24H UR-MRATE: 45 MCG/DL (ref 37–145)
IRON SATN MFR SERPL: 14 % (ref 14–48)
LYMPHOCYTES # BLD AUTO: 1.31 10*3/MM3 (ref 1–3.5)
LYMPHOCYTES NFR BLD AUTO: 15.1 % (ref 20–49)
MCH RBC QN AUTO: 30.3 PG (ref 27–33)
MCHC RBC AUTO-ENTMCNC: 31.1 G/DL (ref 32–35)
MCV RBC AUTO: 97.5 FL (ref 83–97)
MONOCYTES # BLD AUTO: 0.49 10*3/MM3 (ref 0.25–0.8)
MONOCYTES NFR BLD AUTO: 5.7 % (ref 4–12)
NEUTROPHILS # BLD AUTO: 6.64 10*3/MM3 (ref 1.5–7)
NEUTROPHILS NFR BLD AUTO: 76.8 % (ref 39–75)
NRBC BLD MANUAL-RTO: 0 /100 WBC (ref 0–0)
PLATELET # BLD AUTO: 161 10*3/MM3 (ref 150–375)
PMV BLD AUTO: 10.1 FL (ref 8.9–12.1)
RBC # BLD AUTO: 3.63 10*6/MM3 (ref 3.9–5)
TIBC SERPL-MCNC: 319 MCG/DL (ref 249–505)
TRANSFERRIN SERPL-MCNC: 228 MG/DL (ref 200–360)
WBC NRBC COR # BLD: 8.65 10*3/MM3 (ref 4–10)

## 2017-04-04 PROCEDURE — 99214 OFFICE O/P EST MOD 30 MIN: CPT | Performed by: INTERNAL MEDICINE

## 2017-04-04 PROCEDURE — 85025 COMPLETE CBC W/AUTO DIFF WBC: CPT | Performed by: INTERNAL MEDICINE

## 2017-04-04 PROCEDURE — 82728 ASSAY OF FERRITIN: CPT | Performed by: INTERNAL MEDICINE

## 2017-04-04 PROCEDURE — 83540 ASSAY OF IRON: CPT | Performed by: INTERNAL MEDICINE

## 2017-04-04 PROCEDURE — 36415 COLL VENOUS BLD VENIPUNCTURE: CPT | Performed by: INTERNAL MEDICINE

## 2017-04-04 PROCEDURE — 84466 ASSAY OF TRANSFERRIN: CPT | Performed by: INTERNAL MEDICINE

## 2017-05-12 ENCOUNTER — TELEPHONE (OUTPATIENT)
Dept: CARDIOLOGY | Facility: CLINIC | Age: 77
End: 2017-05-12

## 2017-05-12 RX ORDER — DABIGATRAN ETEXILATE 150 MG/1
150 CAPSULE ORAL 2 TIMES DAILY
Qty: 48 CAPSULE | Refills: 0 | COMMUNITY
Start: 2017-05-12 | End: 2017-08-11 | Stop reason: SDDI

## 2017-05-19 RX ORDER — ATORVASTATIN CALCIUM 20 MG/1
TABLET, FILM COATED ORAL
Qty: 90 TABLET | Refills: 1 | Status: SHIPPED | OUTPATIENT
Start: 2017-05-19 | End: 2017-08-31 | Stop reason: SDUPTHER

## 2017-05-30 ENCOUNTER — LAB (OUTPATIENT)
Dept: LAB | Facility: HOSPITAL | Age: 77
End: 2017-05-30

## 2017-05-30 ENCOUNTER — INFUSION (OUTPATIENT)
Dept: ONCOLOGY | Facility: HOSPITAL | Age: 77
End: 2017-05-30

## 2017-05-30 DIAGNOSIS — D50.8 IRON DEFICIENCY ANEMIA REFRACTORY TO IRON THERAPY: ICD-10-CM

## 2017-05-30 DIAGNOSIS — N18.30 ANEMIA DUE TO STAGE 3 CHRONIC KIDNEY DISEASE TREATED WITH ERYTHROPOIETIN (HCC): ICD-10-CM

## 2017-05-30 DIAGNOSIS — D63.1 ANEMIA DUE TO STAGE 3 CHRONIC KIDNEY DISEASE TREATED WITH ERYTHROPOIETIN (HCC): ICD-10-CM

## 2017-05-30 LAB
BASOPHILS # BLD AUTO: 0.05 10*3/MM3 (ref 0–0.1)
BASOPHILS NFR BLD AUTO: 0.6 % (ref 0–1.1)
DEPRECATED RDW RBC AUTO: 56.5 FL (ref 37–49)
EOSINOPHIL # BLD AUTO: 0.23 10*3/MM3 (ref 0–0.36)
EOSINOPHIL NFR BLD AUTO: 2.6 % (ref 1–5)
ERYTHROCYTE [DISTWIDTH] IN BLOOD BY AUTOMATED COUNT: 15.9 % (ref 11.7–14.5)
FERRITIN SERPL-MCNC: 71.9 NG/ML
HCT VFR BLD AUTO: 36 % (ref 34–45)
HGB BLD-MCNC: 11.1 G/DL (ref 11.5–14.9)
IMM GRANULOCYTES # BLD: 0.02 10*3/MM3 (ref 0–0.03)
IMM GRANULOCYTES NFR BLD: 0.2 % (ref 0–0.5)
IRON 24H UR-MRATE: 41 MCG/DL (ref 37–145)
IRON SATN MFR SERPL: 14 % (ref 14–48)
LYMPHOCYTES # BLD AUTO: 1.32 10*3/MM3 (ref 1–3.5)
LYMPHOCYTES NFR BLD AUTO: 15.1 % (ref 20–49)
MCH RBC QN AUTO: 30.2 PG (ref 27–33)
MCHC RBC AUTO-ENTMCNC: 30.8 G/DL (ref 32–35)
MCV RBC AUTO: 98.1 FL (ref 83–97)
MONOCYTES # BLD AUTO: 0.48 10*3/MM3 (ref 0.25–0.8)
MONOCYTES NFR BLD AUTO: 5.5 % (ref 4–12)
NEUTROPHILS # BLD AUTO: 6.63 10*3/MM3 (ref 1.5–7)
NEUTROPHILS NFR BLD AUTO: 76 % (ref 39–75)
NRBC BLD MANUAL-RTO: 0 /100 WBC (ref 0–0)
PLATELET # BLD AUTO: 147 10*3/MM3 (ref 150–375)
PMV BLD AUTO: 11.5 FL (ref 8.9–12.1)
RBC # BLD AUTO: 3.67 10*6/MM3 (ref 3.9–5)
TIBC SERPL-MCNC: 295 MCG/DL (ref 249–505)
TRANSFERRIN SERPL-MCNC: 211 MG/DL (ref 200–360)
WBC NRBC COR # BLD: 8.73 10*3/MM3 (ref 4–10)

## 2017-05-30 PROCEDURE — 36415 COLL VENOUS BLD VENIPUNCTURE: CPT

## 2017-05-30 PROCEDURE — 82728 ASSAY OF FERRITIN: CPT

## 2017-05-30 PROCEDURE — 84466 ASSAY OF TRANSFERRIN: CPT

## 2017-05-30 PROCEDURE — 83540 ASSAY OF IRON: CPT

## 2017-05-30 PROCEDURE — 85025 COMPLETE CBC W/AUTO DIFF WBC: CPT

## 2017-06-19 ENCOUNTER — TELEPHONE (OUTPATIENT)
Dept: ENDOCRINOLOGY | Age: 77
End: 2017-06-19

## 2017-06-19 RX ORDER — ALOGLIPTIN 25 MG/1
TABLET, FILM COATED ORAL
Qty: 30 TABLET | Refills: 5 | Status: SHIPPED | OUTPATIENT
Start: 2017-06-19 | End: 2017-06-20 | Stop reason: SDUPTHER

## 2017-06-19 NOTE — TELEPHONE ENCOUNTER
----- Message from Pattie Balbuena MD sent at 6/19/2017 10:21 AM EDT -----  Contact: patient  Eyes switched her to Alogliptin which is generic take 25 mg once daily.  ----- Message -----     From: Vicky Felder MA     Sent: 6/15/2017  12:30 PM       To: Pattie Balbuena MD     Patient can not afford Tradjenta. Do you have a cheaper alternative she can change to?  ----- Message -----     From: Manasa Walls     Sent: 6/14/2017   4:13 PM       To: Vicky Felder MA    She states she has about a months worth now  She mentioned about an alternative  ----- Message -----     From: Vicky Felder MA     Sent: 6/14/2017   3:56 PM       To: Manasa Walls    We do not have any samples of Tradjenta  ----- Message -----     From: Manasa Walls     Sent: 6/14/2017  12:58 PM       To: Vicky Felder MA    Can not  Afford linagliptin (TRADJENTA) 5 MG tablet tablet 30 tablet 5 4/4/2017 Take 1 tablet by mouth Daily  Needs samples   or an alternative    Let me know

## 2017-06-20 RX ORDER — ALOGLIPTIN 25 MG/1
TABLET, FILM COATED ORAL
Qty: 30 TABLET | Refills: 5 | Status: SHIPPED | OUTPATIENT
Start: 2017-06-20 | End: 2017-06-26 | Stop reason: CLARIF

## 2017-07-06 ENCOUNTER — TRANSCRIBE ORDERS (OUTPATIENT)
Dept: ADMINISTRATIVE | Facility: HOSPITAL | Age: 77
End: 2017-07-06

## 2017-07-06 DIAGNOSIS — Z12.31 VISIT FOR SCREENING MAMMOGRAM: Primary | ICD-10-CM

## 2017-07-12 ENCOUNTER — TELEPHONE (OUTPATIENT)
Dept: ENDOCRINOLOGY | Age: 77
End: 2017-07-12

## 2017-07-12 RX ORDER — SITAGLIPTIN 100 MG/1
100 TABLET, FILM COATED ORAL DAILY
Qty: 30 TABLET | Refills: 5 | Status: SHIPPED | OUTPATIENT
Start: 2017-07-12 | End: 2017-07-17

## 2017-07-12 NOTE — TELEPHONE ENCOUNTER
----- Message from Pattie Balbuena MD sent at 7/12/2017  2:16 PM EDT -----  Let us try Januvia 100 mg daily and see what the cost of this one is  ----- Message -----     From: Vicky Felder MA     Sent: 7/12/2017   1:41 PM       To: Pattie Balbuena MD    Cost of tradjenta after insurance is still too expensive and she is not able to afford this medication.

## 2017-07-17 RX ORDER — GLIMEPIRIDE 2 MG/1
2 TABLET ORAL EVERY MORNING
Qty: 30 TABLET | Refills: 11 | Status: SHIPPED | OUTPATIENT
Start: 2017-07-17 | End: 2018-01-26

## 2017-07-20 RX ORDER — ATENOLOL 100 MG/1
TABLET ORAL
Qty: 60 CAPSULE | Refills: 3 | Status: SHIPPED | OUTPATIENT
Start: 2017-07-20 | End: 2017-07-24 | Stop reason: SDUPTHER

## 2017-07-24 RX ORDER — DABIGATRAN ETEXILATE 150 MG/1
150 CAPSULE ORAL 2 TIMES DAILY
Qty: 56 CAPSULE | Refills: 0 | COMMUNITY
Start: 2017-07-24 | End: 2017-09-21 | Stop reason: SDUPTHER

## 2017-07-25 ENCOUNTER — CLINICAL SUPPORT NO REQUIREMENTS (OUTPATIENT)
Dept: CARDIOLOGY | Facility: CLINIC | Age: 77
End: 2017-07-25

## 2017-07-25 DIAGNOSIS — I48.20 CHRONIC ATRIAL FIBRILLATION (HCC): Primary | ICD-10-CM

## 2017-07-25 PROCEDURE — 93296 REM INTERROG EVL PM/IDS: CPT | Performed by: INTERNAL MEDICINE

## 2017-07-25 PROCEDURE — 93294 REM INTERROG EVL PM/LDLS PM: CPT | Performed by: INTERNAL MEDICINE

## 2017-07-28 ENCOUNTER — RESULTS ENCOUNTER (OUTPATIENT)
Dept: ENDOCRINOLOGY | Age: 77
End: 2017-07-28

## 2017-07-28 DIAGNOSIS — E55.9 VITAMIN D DEFICIENCY: ICD-10-CM

## 2017-07-28 DIAGNOSIS — IMO0002 UNCONTROLLED TYPE 2 DIABETES MELLITUS WITH COMPLICATION, WITH LONG-TERM CURRENT USE OF INSULIN: ICD-10-CM

## 2017-07-28 DIAGNOSIS — E78.5 HYPERLIPIDEMIA, UNSPECIFIED HYPERLIPIDEMIA TYPE: ICD-10-CM

## 2017-07-28 DIAGNOSIS — I10 ESSENTIAL HYPERTENSION: ICD-10-CM

## 2017-07-28 DIAGNOSIS — E66.01 MORBID OBESITY WITH BMI OF 40.0-44.9, ADULT (HCC): ICD-10-CM

## 2017-07-28 DIAGNOSIS — I25.10 CHRONIC CORONARY ARTERY DISEASE: ICD-10-CM

## 2017-08-08 RX ORDER — FUROSEMIDE 20 MG/1
TABLET ORAL
Qty: 450 TABLET | Refills: 1 | Status: SHIPPED | OUTPATIENT
Start: 2017-08-08 | End: 2017-10-30 | Stop reason: SDUPTHER

## 2017-08-10 ENCOUNTER — TELEPHONE (OUTPATIENT)
Dept: CARDIOLOGY | Facility: CLINIC | Age: 77
End: 2017-08-10

## 2017-08-10 NOTE — TELEPHONE ENCOUNTER
Pt called with complaints of lt headedness, and increased BP of 152/94. Pt states she has increased intake of 2 tablets of Carvedilol daily. Pt states she do so much better taking (2) 25 mg tablets. Called pt to retrieve more information. Pt states feeling better taking 2 tablets BP down 127/74. Pt states she is going to see Dr. Espana @ 10:30 am will have lab results sent to office. Please advise if ok to continue (2) 25 mg tabs. Thanks, Samir

## 2017-08-11 ENCOUNTER — APPOINTMENT (OUTPATIENT)
Dept: LAB | Facility: HOSPITAL | Age: 77
End: 2017-08-11

## 2017-08-11 ENCOUNTER — APPOINTMENT (OUTPATIENT)
Dept: ONCOLOGY | Facility: CLINIC | Age: 77
End: 2017-08-11

## 2017-08-11 ENCOUNTER — APPOINTMENT (OUTPATIENT)
Dept: ONCOLOGY | Facility: HOSPITAL | Age: 77
End: 2017-08-11

## 2017-08-11 ENCOUNTER — LAB (OUTPATIENT)
Dept: LAB | Facility: HOSPITAL | Age: 77
End: 2017-08-11

## 2017-08-11 ENCOUNTER — OFFICE VISIT (OUTPATIENT)
Dept: ONCOLOGY | Facility: CLINIC | Age: 77
End: 2017-08-11

## 2017-08-11 VITALS
BODY MASS INDEX: 39.64 KG/M2 | DIASTOLIC BLOOD PRESSURE: 70 MMHG | WEIGHT: 232.2 LBS | HEIGHT: 64 IN | SYSTOLIC BLOOD PRESSURE: 110 MMHG | RESPIRATION RATE: 16 BRPM | TEMPERATURE: 98.7 F | HEART RATE: 72 BPM

## 2017-08-11 DIAGNOSIS — N18.30 ANEMIA DUE TO STAGE 3 CHRONIC KIDNEY DISEASE TREATED WITH ERYTHROPOIETIN (HCC): ICD-10-CM

## 2017-08-11 DIAGNOSIS — D63.1 ANEMIA DUE TO STAGE 3 CHRONIC KIDNEY DISEASE TREATED WITH ERYTHROPOIETIN (HCC): ICD-10-CM

## 2017-08-11 DIAGNOSIS — D50.8 IRON DEFICIENCY ANEMIA REFRACTORY TO IRON THERAPY: Primary | ICD-10-CM

## 2017-08-11 DIAGNOSIS — D50.8 IRON DEFICIENCY ANEMIA REFRACTORY TO IRON THERAPY: ICD-10-CM

## 2017-08-11 LAB
BASOPHILS # BLD AUTO: 0.04 10*3/MM3 (ref 0–0.1)
BASOPHILS NFR BLD AUTO: 0.4 % (ref 0–1.1)
DEPRECATED RDW RBC AUTO: 58.8 FL (ref 37–49)
EOSINOPHIL # BLD AUTO: 0.15 10*3/MM3 (ref 0–0.36)
EOSINOPHIL NFR BLD AUTO: 1.3 % (ref 1–5)
ERYTHROCYTE [DISTWIDTH] IN BLOOD BY AUTOMATED COUNT: 16.1 % (ref 11.7–14.5)
FERRITIN SERPL-MCNC: 96.3 NG/ML
HCT VFR BLD AUTO: 38.9 % (ref 34–45)
HGB BLD-MCNC: 12.2 G/DL (ref 11.5–14.9)
IMM GRANULOCYTES # BLD: 0.04 10*3/MM3 (ref 0–0.03)
IMM GRANULOCYTES NFR BLD: 0.4 % (ref 0–0.5)
IRON 24H UR-MRATE: 46 MCG/DL (ref 37–145)
IRON SATN MFR SERPL: 16 % (ref 14–48)
LYMPHOCYTES # BLD AUTO: 1.12 10*3/MM3 (ref 1–3.5)
LYMPHOCYTES NFR BLD AUTO: 9.9 % (ref 20–49)
MCH RBC QN AUTO: 30.7 PG (ref 27–33)
MCHC RBC AUTO-ENTMCNC: 31.4 G/DL (ref 32–35)
MCV RBC AUTO: 97.7 FL (ref 83–97)
MONOCYTES # BLD AUTO: 0.56 10*3/MM3 (ref 0.25–0.8)
MONOCYTES NFR BLD AUTO: 4.9 % (ref 4–12)
NEUTROPHILS # BLD AUTO: 9.42 10*3/MM3 (ref 1.5–7)
NEUTROPHILS NFR BLD AUTO: 83.1 % (ref 39–75)
NRBC BLD MANUAL-RTO: 0 /100 WBC (ref 0–0)
PLATELET # BLD AUTO: 156 10*3/MM3 (ref 150–375)
PMV BLD AUTO: 11.7 FL (ref 8.9–12.1)
RBC # BLD AUTO: 3.98 10*6/MM3 (ref 3.9–5)
TIBC SERPL-MCNC: 294 MCG/DL (ref 249–505)
TRANSFERRIN SERPL-MCNC: 210 MG/DL (ref 200–360)
WBC NRBC COR # BLD: 11.33 10*3/MM3 (ref 4–10)

## 2017-08-11 PROCEDURE — 36415 COLL VENOUS BLD VENIPUNCTURE: CPT | Performed by: INTERNAL MEDICINE

## 2017-08-11 PROCEDURE — 85025 COMPLETE CBC W/AUTO DIFF WBC: CPT | Performed by: INTERNAL MEDICINE

## 2017-08-11 PROCEDURE — 99214 OFFICE O/P EST MOD 30 MIN: CPT | Performed by: INTERNAL MEDICINE

## 2017-08-11 PROCEDURE — 82728 ASSAY OF FERRITIN: CPT | Performed by: INTERNAL MEDICINE

## 2017-08-11 PROCEDURE — 84466 ASSAY OF TRANSFERRIN: CPT | Performed by: INTERNAL MEDICINE

## 2017-08-11 PROCEDURE — 83540 ASSAY OF IRON: CPT | Performed by: INTERNAL MEDICINE

## 2017-08-11 RX ORDER — TRAZODONE HYDROCHLORIDE 50 MG/1
TABLET ORAL
Refills: 3 | COMMUNITY
Start: 2017-06-21 | End: 2018-01-26

## 2017-08-11 NOTE — PROGRESS NOTES
Subjective .     REASONS FOR FOLLOWUP:  Anemia, cancer    HISTORY OF PRESENT ILLNESS:  The patient is a 77 y.o. year old female  who is here for follow-up with the above-mentioned history.  Denies chest pain.  Denies shortness of air.Denies weakness.  Denies bleeding from any location.    She thinks she has been more dizzy than usual.  She thinks this is due to trazodone, which she has been on for about a month.      Past Medical History:   Diagnosis Date   • Abdominal aortic aneurysm    • Acute combined systolic and diastolic congestive heart failure    • Anemia     Secondary to stage 3 chronic kidney disease.   • Anxiety    • Aortic aneurysm    • Atrial fibrillation    • Back pain    • Breast cancer     Stage I, status post lumpectomy   • CAD (coronary artery disease)    • Cardiomyopathy    • CHF (congestive heart failure)    • Chronic kidney disease    • Colon cancer     Stage I, resected 01/27/2014   • Common cold    • Congestive heart failure    • COPD (chronic obstructive pulmonary disease)    • Coronary artery disease    • Diabetes mellitus     type 2   • Esophageal reflux    • Heart attack    • Hyperlipidemia    • Hypertension    • Iron deficiency anemia secondary to inadequate dietary iron intake    • Joint pain     IN THE RIGHT KNEE   • Mass of adrenal gland     lesion solitary, CT thereof   • Morbid obesity    • Neoplasm of adrenal gland    • Obstructive sleep apnea    • HAYDEN on CPAP    • Osteoarthritis    • Pneumonia     onset date: 01 Mar 2011, Severe pneumonia w mechanical ventilation, treated at [facility]. Patricia Britton   • Radiation    • Sick sinus syndrome    • Strong family history of breast cancer    • Type 2 diabetes mellitus    • Vitamin D deficiency        HEMATOLOGIC/ONCOLOGIC HISTORY:  (History from previous dates can be found in the separate document.)    MEDICATIONS    Current Outpatient Prescriptions:   •  ADVAIR DISKUS 250-50 MCG/DOSE DISKUS, Inhale 1 puff 2 (two) times a day., Disp: , Rfl:    •  atorvastatin (LIPITOR) 20 MG tablet, TAKE ONE TABLET BY MOUTH DAILY, Disp: 90 tablet, Rfl: 1  •  carvedilol (COREG) 25 MG tablet, Take 1/2 tablet twice a day.Take an extra 1/2 when needed., Disp: 135 tablet, Rfl: 3  •  dabigatran etexilate (PRADAXA) 150 MG capsu, Take 1 capsule by mouth 2 (Two) Times a Day., Disp: 56 capsule, Rfl: 0  •  furosemide (LASIX) 20 MG tablet, TAKE 2 TABLETS BY MOUTH every morning AND 3 tablets BY MOUTH at night, Disp: 450 tablet, Rfl: 1  •  glimepiride (AMARYL) 2 MG tablet, Take 1 tablet by mouth Every Morning., Disp: 30 tablet, Rfl: 11  •  Insulin Glargine 300 UNIT/ML solution pen-injector, Inject 40 Units under the skin Daily., Disp: 9 pen, Rfl: 3  •  magnesium oxide (MAG-OX) 400 MG tablet, Take 400 mg by mouth 2 (Two) Times a Day., Disp: , Rfl: 11  •  Melatonin 10 MG capsule, Take 1 capsule by mouth every night., Disp: , Rfl:   •  Multiple Vitamins-Minerals (MULTIVITAMIN WOMEN 50+ PO), QD, Disp: , Rfl:   •  nitroglycerin (NITROSTAT) 0.4 MG SL tablet, Place 1 tablet under the tongue Every 5 (Five) Minutes As Needed for chest pain., Disp: 20 tablet, Rfl: 0  •  omeprazole (priLOSEC) 40 MG capsule, Take 40 mg by mouth Daily., Disp: , Rfl: 1  •  PROAIR RESPICLICK 108 (90 BASE) MCG/ACT inhaler, Every 6 (Six) Hours As Needed., Disp: , Rfl:   •  tolnaftate (TINACTIN) 1 % powder, APPLY TO AFFECTED AREA TWICE DAILY as needed, Disp: , Rfl: 0  •  traZODone (DESYREL) 50 MG tablet, TAKE ONE TABLET BY MOUTH AT BEDTIME, Disp: , Rfl: 3    ALLERGIES:     Allergies   Allergen Reactions   • Codeine Itching     Edema   • Hydralazine Hcl      BP drops.   • Lisinopril Cough     Edema       SOCIAL HISTORY:       Social History     Social History   • Marital status:      Spouse name: N/A   • Number of children: N/A   • Years of education: N/A     Occupational History   • Retired      Social History Main Topics   • Smoking status: Former Smoker     Packs/day: 1.00     Years: 25.00     Quit date: 2004  "  • Smokeless tobacco: Never Used      Comment: Daily caffeine use   • Alcohol use Yes      Comment: seldom   • Drug use: No   • Sexual activity: Defer     Other Topics Concern   • Not on file     Social History Narrative         FAMILY HISTORY:  Family History   Problem Relation Age of Onset   • Breast cancer Mother 60   • Breast cancer Sister 50   • Arrhythmia Sister    • Hypertension Sister    • Brain cancer Father 38   • Breast cancer Sister 40   • Heart attack Brother    • Hypertension Brother    • Diabetes Son    • Hypertension Son    • Heart disease Maternal Grandmother        REVIEW OF SYSTEMS:  GENERAL: No change in appetite or weight;   No fevers, chills, sweats.    SKIN: No nonhealing lesions.   No rashes.  HEME/LYMPH: No easy bruising, bleeding.   No swollen nodes.   EYES: No vision changes or diplopia.   ENT: No tinnitus, hearing loss, gum bleeding, epistaxis, hoarseness or dysphagia.   RESPIRATORY: No cough, shortness of breath, hemoptysis or wheezing.   CVS: No chest pain, palpitations, orthopnea, dyspnea on exertion or PND.   GI: No melena or hematochezia.   No abdominal pain.  No nausea, vomiting, constipation, diarrhea  : No lower tract obstructive symptoms, dysuria or hematuria.   MUSCULOSKELETAL: No bone pain.  No joint stiffness.   NEUROLOGICAL: see HPI   PSYCHIATRIC: No increased nervousness, mood changes or depression.         Objective    Vitals:    08/11/17 1046   BP: 110/70   Pulse: 72   Resp: 16   Temp: 98.7 °F (37.1 °C)   Weight: 232 lb 3.2 oz (105 kg)   Height: 63.78\" (162 cm)  Comment: new ht.   PainSc: 0-No pain     Current Status 8/11/2017   ECOG score 0   Some recent data might be hidden      PHYSICAL EXAM:    GENERAL:  Well-developed, well-nourished in no acute distress.   SKIN:  Warm, dry without rashes, purpura or petechiae.  HEAD:  Normocephalic.  EYES:  Pupils equal, round and reactive to light.  EOMs intact.  Conjunctivae normal.  EARS:  Hearing intact.  NOSE:  Septum " midline.  No excoriations or nasal discharge.  MOUTH:  Tongue is well-papillated; no stomatitis or ulcers.  Lips normal.  THROAT:  Oropharynx without lesions or exudates.  NECK:  Supple with good range of motion; no thyromegaly or masses, no JVD.  LYMPHATICS:  No cervical, supraclavicular, axillary or inguinal adenopathy.  CHEST:  Lungs clear to percussion and auscultation. Good airflow.  Declines breast exam  CARDIAC:  Regular rate and rhythm without murmurs, rubs or gallops. Normal S1,S2.  BREAST: no masses by palpation or inspection   ABDOMEN:  Soft, nontender with no organomegaly or masses.  EXTREMITIES:  No clubbing, cyanosis or edema.  NEUROLOGICAL:  Cranial Nerves II-XII grossly intact.  No focal neurological deficits.  PSYCHIATRIC:  Normal affect and mood.      RECENT LABS:        WBC   Date/Time Value Ref Range Status   08/11/2017 10:29 AM 11.33 (H) 4.00 - 10.00 10*3/mm3 Final     Hemoglobin   Date/Time Value Ref Range Status   08/11/2017 10:29 AM 12.2 11.5 - 14.9 g/dL Final     Platelets   Date/Time Value Ref Range Status   08/11/2017 10:29  150 - 375 10*3/mm3 Final       Assessment/Plan   There are no diagnoses linked to this encounter.    ASSESSMENT:  1.  Iron deficiency anemia. Intolerant and does not respond to oral iron, has used Feraheme. She follows with Dr. Gautam   Recent iron studies normal.    2. Anemia secondary to stage 3 chronic kidney disease. Procrit started 11/03/2015.   Has not needed Procrit recently.    3. Stage I breast cancer. She completed 5 years of tamoxifen in 2005.   Last mammogram 08/18/2015, BIRADS 2.     4. Stage I colon adenocarcinom a, resected 01/27/2014. No adjuvant chemotherapy indicated. Follows with Dr. Gautam regularly.     5. Difficult IV stick. She states she has lots of issues with this. I told her at some point she may need a port to have access for IV infusions and blood trans fusions. We discussed some of the risks with this. For now, we will hold off, but if  the IV sticks become more difficult, we may need to reconsider this.     6.  Intermittent yeast infection at the folds of her skin under her breasts bilaterally, intermittently. She thinks nystatin cream works better than nystatin powder. She uses Desitin as well.     7. Chronic kidney disease. Creatinine 0.8 and 1.2 on lab checks from 2013. Creatinine was noted to be 1.8 on 10/08/2015 and 11/03/2015. She is now seeing Dr. Isha Kraus of Nephrology for further management of this as well as her hyperkalemia and blood pressure issues.     PLAN:   · M.D. 6 months.  Iron labs 1 week before.  · (Eliminate every 2 month labs.  No plans for Procrit as she has not needed this for several months.  Was previously receiving Procrit if Hb <10).  · Defer further BMP assessments to her other providers.   · Last mammogram 8/18/16, BIRADS 1. --- She states this is scheduled next week.    Daughter assisted with history.

## 2017-08-16 ENCOUNTER — TELEPHONE (OUTPATIENT)
Dept: CARDIOLOGY | Facility: CLINIC | Age: 77
End: 2017-08-16

## 2017-08-16 NOTE — TELEPHONE ENCOUNTER
Pt called and states that she still feels dizzy and light headed. Pt BP reading 144/82. Pt wants to know if you can decrease her diuretic.     Pt current med    Lasix 20 mg 2 tabs am 2 tabs @1pm and 1 tab evening   Carvedilol 25 mg 1 tablet am 1/2 pm 1/2 at night as needed    C/b number [phone]    Please advise    Thanks  Charissa THOMPSON

## 2017-08-16 NOTE — TELEPHONE ENCOUNTER
Called and informed pt. Pt will check her weight and bp, will call if any changes.....Charissa THOMPSON

## 2017-08-21 ENCOUNTER — TELEPHONE (OUTPATIENT)
Dept: CARDIOLOGY | Facility: CLINIC | Age: 77
End: 2017-08-21

## 2017-08-21 ENCOUNTER — APPOINTMENT (OUTPATIENT)
Dept: MAMMOGRAPHY | Facility: HOSPITAL | Age: 77
End: 2017-08-21
Attending: INTERNAL MEDICINE

## 2017-08-21 NOTE — TELEPHONE ENCOUNTER
Pt called stating she called last week and decreased her furosemide dosage.  She states she has now gone up 4lbs since decreasing and would like to know if she should increase it again.  Pt states she is still feeling lightheaded as well.      Please advise     [phone]  10:47 am

## 2017-08-31 RX ORDER — ATORVASTATIN CALCIUM 20 MG/1
TABLET, FILM COATED ORAL
Qty: 90 TABLET | Refills: 0 | Status: SHIPPED | OUTPATIENT
Start: 2017-08-31 | End: 2018-01-26

## 2017-09-18 RX ORDER — CARVEDILOL 25 MG/1
TABLET ORAL
Qty: 180 TABLET | Refills: 1 | Status: SHIPPED | OUTPATIENT
Start: 2017-09-18 | End: 2017-12-18 | Stop reason: SDUPTHER

## 2017-09-21 ENCOUNTER — CLINICAL SUPPORT NO REQUIREMENTS (OUTPATIENT)
Dept: CARDIOLOGY | Facility: CLINIC | Age: 77
End: 2017-09-21

## 2017-09-21 ENCOUNTER — OFFICE VISIT (OUTPATIENT)
Dept: CARDIOLOGY | Facility: CLINIC | Age: 77
End: 2017-09-21

## 2017-09-21 ENCOUNTER — HOSPITAL ENCOUNTER (OUTPATIENT)
Dept: MAMMOGRAPHY | Facility: HOSPITAL | Age: 77
Discharge: HOME | End: 2017-09-21
Attending: INTERNAL MEDICINE | Admitting: INTERNAL MEDICINE

## 2017-09-21 VITALS
SYSTOLIC BLOOD PRESSURE: 140 MMHG | HEIGHT: 64 IN | HEART RATE: 70 BPM | BODY MASS INDEX: 40.29 KG/M2 | WEIGHT: 236 LBS | DIASTOLIC BLOOD PRESSURE: 70 MMHG

## 2017-09-21 DIAGNOSIS — I10 ESSENTIAL HYPERTENSION: ICD-10-CM

## 2017-09-21 DIAGNOSIS — I48.21 PERMANENT ATRIAL FIBRILLATION (HCC): ICD-10-CM

## 2017-09-21 DIAGNOSIS — I42.9 CARDIOMYOPATHY (HCC): ICD-10-CM

## 2017-09-21 DIAGNOSIS — I25.10 CHRONIC CORONARY ARTERY DISEASE: Primary | ICD-10-CM

## 2017-09-21 DIAGNOSIS — G47.33 OSA (OBSTRUCTIVE SLEEP APNEA): ICD-10-CM

## 2017-09-21 DIAGNOSIS — I34.0 NON-RHEUMATIC MITRAL REGURGITATION: ICD-10-CM

## 2017-09-21 DIAGNOSIS — Z12.31 VISIT FOR SCREENING MAMMOGRAM: ICD-10-CM

## 2017-09-21 DIAGNOSIS — I48.20 CHRONIC ATRIAL FIBRILLATION (HCC): Primary | ICD-10-CM

## 2017-09-21 DIAGNOSIS — E78.5 HYPERLIPIDEMIA, UNSPECIFIED HYPERLIPIDEMIA TYPE: ICD-10-CM

## 2017-09-21 PROCEDURE — 77063 BREAST TOMOSYNTHESIS BI: CPT

## 2017-09-21 PROCEDURE — G0202 SCR MAMMO BI INCL CAD: HCPCS

## 2017-09-21 PROCEDURE — 93279 PRGRMG DEV EVAL PM/LDLS PM: CPT | Performed by: INTERNAL MEDICINE

## 2017-09-21 PROCEDURE — 99214 OFFICE O/P EST MOD 30 MIN: CPT | Performed by: INTERNAL MEDICINE

## 2017-09-21 RX ORDER — DABIGATRAN ETEXILATE 150 MG/1
150 CAPSULE ORAL 2 TIMES DAILY
Qty: 56 CAPSULE | Refills: 0 | COMMUNITY
Start: 2017-09-21 | End: 2018-01-15 | Stop reason: SDUPTHER

## 2017-09-21 RX ORDER — FAMOTIDINE 20 MG/1
1 TABLET, FILM COATED ORAL 2 TIMES DAILY
Refills: 1 | COMMUNITY
Start: 2017-09-14 | End: 2018-01-01 | Stop reason: HOSPADM

## 2017-09-21 RX ORDER — MONTELUKAST SODIUM 10 MG/1
1 TABLET ORAL DAILY
Refills: 5 | COMMUNITY
Start: 2017-08-21 | End: 2018-01-26

## 2017-09-21 NOTE — PROGRESS NOTES
Date of Office Visit: 2017  Encounter Provider: Rhiannon Rios MD  Place of Service: [facility] CARDIOLOGY  Patient Name: Renuka Patel  :1940      Patient ID:  Renuka Patel is a 77 y.o. female is here for  followup for CMP, a fib and CAD.         History of Present Illness    I first saw her in 2010 for atrial fibrillation which was new  onset. We attempted a cardioversion but she developed severe bradycardia  after that, and required a pacemaker implantation which was performed on  2011. The second attempt to cardiovert her in 2011 was unsuccessful.    She has been on Coumadin and rate controlled since that time.    She was admitted to [facility] for acute respiratory  failure on 2011 requiring mechanical ventilation secondary to  severe pneumonia. She went into congestive heart failure and atrial  fibrillation with rapid ventricular response. The hospitalization was long  and complex.       After she recovered she had a stress study to rule out ischemia as  a reason for her heart failure. This was performed on 2011 and  was a two day nuclear myocardial perfusion study and was normal. She had an  echocardiogram performed on 2012 which showed normal left  ventricular systolic function, severe biatrial enlargement, right  ventricular enlargement, mild mitral insufficiency, and indeterminate  diastolic dysfunction.       I then saw her in the spring of 2012. She was having episodes of dyspnea on  exertion as well as lightheadedness and a lot of fatigue. As it turns out,  by interrogating her pacemaker, we found episodes of atrial fibrillation  with rapid ventricular response. She underwent AV node ablation for atrial  fibrillation with rapid ventricular response on 2012 with Dr. Bear Rodrigues and she did great after that. She became ill however; and  presented to [facility] on  07/21/2012. She had  accelerated hypertension and pulmonary edema. She was diuresed and her  blood pressure medications were adjusted.       During her hospitalization in 2012, she lost 15 pounds. She had a CT scan performed  which showed a left adrenal mass at 3.5 cm and a small abdominal aortic  aneurysm which was infrarenal and measured 3.3 cm. She was seen by  endocrinology, Dr. Felton, and cardiology while she was there. She had an  echocardiogram performed on 07/20/2012 which showed her ejection fraction  moderately reduced at 30-35% with septal akinesis, inferior wall akinesis,  left atrium was mildly dilated, the right atrium was mildly dilated, mild  mitral insufficiency and mild tricuspid insufficiency. This was clearly a  change from her prior echocardiogram.           On 11/12/2013, she had a colonoscopy with Dr. Gautam, which showed an ulcerated mass in the  sigmoid colon, which turned out to be positive for colon cancer and now she is scheduled  to see Dr. Nathan Bernal the second week in December. After her colonoscopy then, on  11/17/2013, she presented to [facility] with prolonged shoulder  discomfort and wound up ruling in for a non-ST elevation myocardial infarction. She went  on for cardiac catheterization with Dr. Mayfield. This showed normal left main coronary  artery, normal non-dominant left circumflex vessel, 99% proximal LAD stenosis going into  the mid-LAD involving the origin of the second diagonal branch with a 75% ostial second  diagonal stenosis. The first diagonal branch was normal. The right coronary artery showed  50% at the posterolateral arteries. Her ejection fraction was reduced at 35-40%. She went  on to receive a 2.25 x 15 mm drug-eluting stent to the second diagonal branch of the left  anterior descending artery and then she received a 2.75 x 18 mm long Xience drug-eluting  stent into the midleft anterior descending artery. Then she received a 3.5 x 23 mm  long  Xience drug-eluting stent in the proximal left anterior descending artery. Afterwards, she  did well and was able to be discharged, but in the meantime, she still has this diagnosis  of colon cancer and she is on three anticoagulation medications; aspirin, Effient, and  Pradaxa.       She had an echocardiogram on 2013 which revealed an ejection  fraction of 53%, apical wall hypokinesis and moderate mitral insufficiency,  moderate-to-severe tricuspid insufficiency and grade III diastolic dysfunction. She also  had a PET stress study done on 2013 which revealed a moderate area of myocardial  infarction in the apical wall with no ischemia. There was apical akinesis and the  ejection fraction was 36%. She had a CT of the abdomen and pelvis on 2014 showing  no metastatic disease, sigmoid thickening with malignancy there and a 3.6 cm infrarenal  abdominal aortic aneurysm. She has an adrenal adenoma which really is unchanged.    Her  Claudio  around ma2013 and this has been very  difficult for her.       She is status post her colon surgery which was done for colon cancer with Dr. Bernal 2014.  At a semiurgent visit on 2014, she was  short-winded and had edema. It turned out she was quite anemic, and her blood pressure  was low so we decreased her Diovan to 80 mg daily and stopped her Effient.       She saw Dr. Reza for her sleep apnea. They  decreased the pressures on her CPAP mask so she is able to wear it for more than three  hours a night.      She was admitted on 2014, with heart  failure and hypertensive emergency. She was diuresed and did much better. She had a  chest x-ray during the hospitalization, which did show heart failure. She had a 2-D  echocardiogram with Doppler showing an ejection fraction of 40%-45%, apical and mid distal  anterior wall severe hypokinesis, moderate to severe right atrial dilation, severe left  atrial dilation, mild aortic  insufficiency, moderate mitral insufficiency, right  ventricular systolic pressure of 47 mmHg, and moderate tricuspid insufficiency.       She has developed an abdominal wall hernia but it is not causing her any pain, so she is going to wear a binder for now.  The patient [facility] to  11/12/2014 with acute congestive heart failure. This was systolic in nature. Her blood  pressure was elevated. She was hypoxic. She required BiPAP and diuretics. After  adjusting her medications for her blood pressure and using diuretics she came back to  Baseline.      She had an echocardiogram last done in 04/2015. This showed right ventricular systolic pressure  elevated at 54 mmHg, mild tricuspid insufficiency, grade 2 diastolic dysfunction, moderate  left ventricular dilation, mild concentric left ventricular hypertrophy, ejection fraction  of 54% with basilar lateral and basilar anterior akinesis and aneurysm. There was severe  biatrial enlargement and mild to moderate mitral insufficiency.       She was admitted on 08/24/2016, with acute decompensating congestive heart failure, both systolic and diastolic in nature. She had an echocardiogram, which showed her ejection fraction had dropped to 32%. She was treated with IV diuretics. She was very weak and so physical therapy was also consulted on the case. She ruled out for myocardial infarction but did not have a stress study done during the hospitalization because she needed a PET stress study and the PET camera was in repair.      She had a PET stress test done 09/2016 showing ejection fraction of 37% and no evidence of ischemia.      She had pacemaker interrogation done today which shows normal VVIR pacing and she is ventricularly pacing 99% of the time.  She is in permanent atrial fibrillation.       She is doing well at this time.  She is having problems with her allergies and nasal drainage.  She has no fevers,  chills, cough.  She has no chest pain or pressure.  She's had some intermittent dizziness but no falls.  She is no lower extremity edema.  She is no orthopnea or PND.  She struggling with her sleep apnea mask because material [place] cleanout causes her face to break out.  She is taking her medications as directed.    Past Medical History:   Diagnosis Date   • Abdominal aortic aneurysm    • Acute bronchitis    • Acute combined systolic and diastolic congestive heart failure    • Anemia     Secondary to stage 3 chronic kidney disease.   • Aneurysm of abdominal aorta    • Anxiety    • Aortic aneurysm    • Arteriosclerotic coronary artery disease    • Atrial fibrillation    • Back pain    • Breast cancer     Stage I, status post lumpectomy   • CAD (coronary artery disease)    • Cardiomyopathy    • CHF (congestive heart failure)    • Chronic kidney disease    • Colon cancer     Stage I, resected 01/27/2014   • Common cold    • Congestive heart failure    • COPD (chronic obstructive pulmonary disease)    • Coronary artery disease    • Cough    • Diabetes mellitus     type 2   • Dizziness    • Dyspnea    • Esophageal reflux    • Heart attack    • Hyperlipidemia    • Hypertension    • Iron deficiency anemia secondary to inadequate dietary iron intake    • Joint pain     IN THE RIGHT KNEE   • Mass of adrenal gland     lesion solitary, CT thereof   • Morbid obesity    • Neoplasm of adrenal gland    • Obstructive sleep apnea    • Oral thrush    • HAYDEN on CPAP    • Osteoarthritis    • Pneumonia     onset date: 01 Mar 2011, Severe pneumonia w mechanical ventilation, treated at [facility]. Patricia Britton   • Postoperative visit    • Radiation    • Sick sinus syndrome    • Strong family history of breast cancer    • Type 2 diabetes mellitus    • Ventral hernia    • Vitamin D deficiency          Past Surgical History:   Procedure Laterality Date   • BACK SURGERY     • BREAST BIOPSY     • BREAST LUMPECTOMY     • BREAST SURGERY     •  CARDIAC PACEMAKER PLACEMENT     • COLON SURGERY  01/27/2014    resection for colon cancer   • HAND SURGERY     • HYSTERECTOMY     • TIBIA FRACTURE SURGERY      left   • TOE SURGERY      bilat all toenails removed x3 r/t fungus       Current Outpatient Prescriptions on File Prior to Visit   Medication Sig Dispense Refill   • ADVAIR DISKUS 250-50 MCG/DOSE DISKUS Inhale 1 puff 2 (two) times a day.     • atorvastatin (LIPITOR) 20 MG tablet TAKE ONE TABLET BY MOUTH DAILY 90 tablet 0   • carvedilol (COREG) 25 MG tablet Take 1/2 tablet twice a day.Take an extra 1/2 when needed. 180 tablet 1   • dabigatran etexilate (PRADAXA) 150 MG capsu Take 1 capsule by mouth 2 (Two) Times a Day. 56 capsule 0   • furosemide (LASIX) 20 MG tablet TAKE 2 TABLETS BY MOUTH every morning AND 3 tablets BY MOUTH at night 450 tablet 1   • glimepiride (AMARYL) 2 MG tablet Take 1 tablet by mouth Every Morning. 30 tablet 11   • Insulin Glargine 300 UNIT/ML solution pen-injector Inject 40 Units under the skin Daily. 9 pen 3   • magnesium oxide (MAG-OX) 400 MG tablet Take 400 mg by mouth 2 (Two) Times a Day.  11   • Melatonin 10 MG capsule Take 1 capsule by mouth every night.     • Multiple Vitamins-Minerals (MULTIVITAMIN WOMEN 50+ PO) QD     • nitroglycerin (NITROSTAT) 0.4 MG SL tablet Place 1 tablet under the tongue Every 5 (Five) Minutes As Needed for chest pain. 20 tablet 0   • PROAIR RESPICLICK 108 (90 BASE) MCG/ACT inhaler Every 6 (Six) Hours As Needed.     • tolnaftate (TINACTIN) 1 % powder APPLY TO AFFECTED AREA TWICE DAILY as needed  0   • traZODone (DESYREL) 50 MG tablet TAKE ONE TABLET BY MOUTH AT BEDTIME  3   • [DISCONTINUED] omeprazole (priLOSEC) 40 MG capsule Take 40 mg by mouth Daily.  1     No current facility-administered medications on file prior to visit.        Social History     Social History   • Marital status:      Spouse name: N/A   • Number of children: N/A   • Years of education: N/A     Occupational History   •  "Retired      Social History Main Topics   • Smoking status: Former Smoker     Packs/day: 1.00     Years: 25.00     Quit date: 2004   • Smokeless tobacco: Never Used      Comment: Daily caffeine use   • Alcohol use Yes      Comment: seldom   • Drug use: No   • Sexual activity: Defer     Other Topics Concern   • Not on file     Social History Narrative           Review of Systems   Constitution: Negative.   HENT: Negative for congestion.    Eyes: Negative for vision loss in left eye and vision loss in right eye.   Respiratory: Negative.  Negative for cough, hemoptysis, shortness of breath, sleep disturbances due to breathing, snoring, sputum production and wheezing.    Endocrine: Negative.    Hematologic/Lymphatic: Negative.    Skin: Negative for poor wound healing and rash.   Musculoskeletal: Negative for falls, gout, muscle cramps and myalgias.   Gastrointestinal: Negative for abdominal pain, diarrhea, dysphagia, hematemesis, melena, nausea and vomiting.   Neurological: Negative for excessive daytime sleepiness, dizziness, headaches, light-headedness, loss of balance, seizures and vertigo.   Psychiatric/Behavioral: Negative for depression and substance abuse. The patient is not nervous/anxious.        Procedures  Procedures       Objective:      Vitals:    09/21/17 1229   BP: 140/70   BP Location: Left arm   Patient Position: Sitting   Pulse: 70   Weight: 236 lb (107 kg)   Height: 64\" (162.6 cm)     Body mass index is 40.51 kg/(m^2).    Physical Exam   Constitutional: She is oriented to person, place, and time. She appears well-developed and well-nourished. No distress.   HENT:   Head: Normocephalic and atraumatic.   Eyes: Conjunctivae are normal. No scleral icterus.   Neck: Neck supple. No JVD present. Carotid bruit is not present. No thyromegaly present.   Cardiovascular: Normal rate, regular rhythm, S1 normal, S2 normal, normal heart sounds and intact distal pulses.   No extrasystoles are present. PMI is not " displaced.    No murmur heard.  Pulses:       Carotid pulses are 2+ on the right side, and 2+ on the left side.       Radial pulses are 2+ on the right side, and 2+ on the left side.        Dorsalis pedis pulses are 2+ on the right side, and 2+ on the left side.        Posterior tibial pulses are 2+ on the right side, and 2+ on the left side.   Pulmonary/Chest: Effort normal and breath sounds normal. No respiratory distress. She has no wheezes. She has no rhonchi. She has no rales. She exhibits no tenderness.   Abdominal: Soft. Bowel sounds are normal. She exhibits no distension, no abdominal bruit, no ascites and no mass. There is no tenderness.   Musculoskeletal: She exhibits no edema or deformity.   Lymphadenopathy:     She has no cervical adenopathy.   Neurological: She is alert and oriented to person, place, and time. No cranial nerve deficit.   Skin: Skin is warm and dry. No rash noted. She is not diaphoretic. No cyanosis. No pallor. Nails show no clubbing.   Psychiatric: She has a normal mood and affect. Judgment normal.   Vitals reviewed.      Lab Review:       Assessment:      Diagnosis Plan   1. Chronic coronary artery disease     2. Cardiomyopathy     3. Permanent atrial fibrillation     4. Non-rheumatic mitral regurgitation     5. Essential hypertension     6. Hyperlipidemia, unspecified hyperlipidemia type     7. HAYDEN (obstructive sleep apnea)       1. History of congestive heart failure, resolved. These episodes have been due to    hypertension. In hospital 8/2016 for CHF. No CHF now.    3. Hypertension. Blood pressure is well controlled at home..  4. Adrenal mass, 3.5 cm, followed by Dr. Felton.    5. Small abdominal aortic aneurysm, 3.6 cm by CTA 01/20/2014.    6. Chronic atrial fibrillation status post AV node ablation with pacemaker. Stay on Pradaxa 150 mg twice daily with food.  7. Diabetes mellitus type 2. Followed by Dr. Balbuena.  8. Morbid obesity. Is trying to lose weight.  9. Gastroesophageal  reflux disease.    10. Chronic obstructive pulmonary disease.    11. History of breast cancer with lumpectomy on the right breast.    12. Obstructive sleep apnea on CPAP.    13. Anxiety.    14. Dizziness worse with anemia.  15. High social stressors.    16. Colon cancer. Dr. Joaquim Bernal colectomy surgery 01/27/2014.    17. NSTEMI 11/17/13 with cardiomyopathy LVEF 35-40%, status post LAD and 2nd diagonal stents  at [facility] with Dr. Mayfield. Echocardiogram 05/2014 showed LVEF about 40%, apical hypokinesis.    LVEF 54% on echocardiogram 04/2015, 30% on 8/2016.   18. Anemia, sees Dr. Hart. Her upper and lower endoscopy with Dr. Bernal in 2015 were normal. This is under better control now.   19. CKD, sees Chata Gerardo.      Plan:       See back in [facility], no med changes.    Atrial Fibrillation and Atrial Flutter  Assessment  • The patient has permanent atrial fibrillation  • This is non-valvular in etiology  • The patient's CHADS2-VASc score is 6  • A [id number] score of 2 or more is considered a high risk for a thromboembolic event  • Warfarin prescribed    Plan  • Continue in atrial fibrillation with rate control  • Continue warfarin for antithrombotic therapy, bleeding issues discussed  • Continue beta blocker for rate control      Coronary Artery Disease  Assessment  • The patient has no angina    Subjective - Objective  • There is a history of past MI  • There has been a previous stent procedure using SERGIO      Heart Failure  Assessment  • NYHA class III-A - There is limitation of physical activity. The patient is comfortable at rest, but ordinary activity causes fatigue, palpitations or shortness of breath.  • Beta blocker prescribed  • Diuretics prescribed  • Left ventricular function is moderately reduced by qualitative assessment    Subjective/Objective    • Physical exam findings negative for rales, peripheral edema, elevated JVP and ascites.

## 2017-10-30 RX ORDER — FUROSEMIDE 40 MG/1
TABLET ORAL
Qty: 450 TABLET | Refills: 2 | Status: SHIPPED | OUTPATIENT
Start: 2017-10-30 | End: 2018-03-30 | Stop reason: SDUPTHER

## 2017-11-25 RX ORDER — ATENOLOL 100 MG/1
TABLET ORAL
Qty: 60 CAPSULE | Refills: 3 | Status: SHIPPED | OUTPATIENT
Start: 2017-11-25 | End: 2018-01-15 | Stop reason: SDUPTHER

## 2017-11-27 RX ORDER — PEN NEEDLE, DIABETIC 32GX 5/32"
NEEDLE, DISPOSABLE MISCELLANEOUS
Qty: 100 EACH | Refills: 0 | Status: SHIPPED | OUTPATIENT
Start: 2017-11-27 | End: 2018-03-22 | Stop reason: SDUPTHER

## 2017-12-18 RX ORDER — CARVEDILOL 25 MG/1
TABLET ORAL
Qty: 180 TABLET | Refills: 1 | Status: SHIPPED | OUTPATIENT
Start: 2017-12-18 | End: 2018-01-01 | Stop reason: DRUGHIGH

## 2017-12-28 ENCOUNTER — TELEPHONE (OUTPATIENT)
Dept: ENDOCRINOLOGY | Age: 77
End: 2017-12-28

## 2017-12-28 NOTE — TELEPHONE ENCOUNTER
Called patient on home number but got disconnected.  Called patient on cell number and left voicemail to inform patient that her patient assistance has arrive at our office.

## 2018-01-01 ENCOUNTER — LAB (OUTPATIENT)
Dept: ENDOCRINOLOGY | Age: 78
End: 2018-01-01

## 2018-01-01 ENCOUNTER — RESULTS ENCOUNTER (OUTPATIENT)
Dept: ENDOCRINOLOGY | Age: 78
End: 2018-01-01

## 2018-01-01 ENCOUNTER — OFFICE VISIT (OUTPATIENT)
Dept: CARDIOLOGY | Facility: CLINIC | Age: 78
End: 2018-01-01

## 2018-01-01 ENCOUNTER — TELEPHONE (OUTPATIENT)
Dept: CARDIOLOGY | Facility: CLINIC | Age: 78
End: 2018-01-01

## 2018-01-01 ENCOUNTER — INFUSION (OUTPATIENT)
Dept: ONCOLOGY | Facility: HOSPITAL | Age: 78
End: 2018-01-01

## 2018-01-01 ENCOUNTER — CLINICAL SUPPORT NO REQUIREMENTS (OUTPATIENT)
Dept: CARDIOLOGY | Facility: CLINIC | Age: 78
End: 2018-01-01

## 2018-01-01 ENCOUNTER — TELEPHONE (OUTPATIENT)
Dept: ENDOCRINOLOGY | Age: 78
End: 2018-01-01

## 2018-01-01 ENCOUNTER — LAB (OUTPATIENT)
Dept: LAB | Facility: HOSPITAL | Age: 78
End: 2018-01-01

## 2018-01-01 ENCOUNTER — APPOINTMENT (OUTPATIENT)
Dept: LAB | Facility: HOSPITAL | Age: 78
End: 2018-01-01

## 2018-01-01 ENCOUNTER — TRANSCRIBE ORDERS (OUTPATIENT)
Dept: ADMINISTRATIVE | Facility: HOSPITAL | Age: 78
End: 2018-01-01

## 2018-01-01 ENCOUNTER — OFFICE VISIT (OUTPATIENT)
Dept: ONCOLOGY | Facility: CLINIC | Age: 78
End: 2018-01-01

## 2018-01-01 ENCOUNTER — HOSPITAL ENCOUNTER (OUTPATIENT)
Dept: MAMMOGRAPHY | Facility: HOSPITAL | Age: 78
Discharge: HOME | End: 2018-10-17
Attending: INTERNAL MEDICINE | Admitting: INTERNAL MEDICINE

## 2018-01-01 ENCOUNTER — LAB (OUTPATIENT)
Dept: LAB | Facility: HOSPITAL | Age: 78
End: 2018-01-01
Attending: INTERNAL MEDICINE

## 2018-01-01 ENCOUNTER — HOSPITAL ENCOUNTER (OUTPATIENT)
Dept: CARDIOLOGY | Facility: HOSPITAL | Age: 78
Discharge: HOME | End: 2018-07-23
Attending: INTERNAL MEDICINE | Admitting: INTERNAL MEDICINE

## 2018-01-01 VITALS
BODY MASS INDEX: 43.94 KG/M2 | DIASTOLIC BLOOD PRESSURE: 90 MMHG | HEART RATE: 92 BPM | SYSTOLIC BLOOD PRESSURE: 144 MMHG | WEIGHT: 256.1 LBS

## 2018-01-01 VITALS
SYSTOLIC BLOOD PRESSURE: 120 MMHG | OXYGEN SATURATION: 99 % | HEIGHT: 64 IN | BODY MASS INDEX: 43.36 KG/M2 | WEIGHT: 254 LBS | HEART RATE: 69 BPM | DIASTOLIC BLOOD PRESSURE: 80 MMHG

## 2018-01-01 VITALS
DIASTOLIC BLOOD PRESSURE: 56 MMHG | SYSTOLIC BLOOD PRESSURE: 136 MMHG | BODY MASS INDEX: 43.54 KG/M2 | WEIGHT: 255 LBS | HEIGHT: 64 IN | HEART RATE: 78 BPM

## 2018-01-01 VITALS
HEART RATE: 69 BPM | SYSTOLIC BLOOD PRESSURE: 130 MMHG | HEIGHT: 64 IN | DIASTOLIC BLOOD PRESSURE: 76 MMHG | BODY MASS INDEX: 43.94 KG/M2 | WEIGHT: 257.4 LBS

## 2018-01-01 VITALS
SYSTOLIC BLOOD PRESSURE: 134 MMHG | BODY MASS INDEX: 43.54 KG/M2 | WEIGHT: 255 LBS | HEIGHT: 64 IN | HEART RATE: 55 BPM | DIASTOLIC BLOOD PRESSURE: 70 MMHG

## 2018-01-01 VITALS
HEIGHT: 64 IN | SYSTOLIC BLOOD PRESSURE: 146 MMHG | DIASTOLIC BLOOD PRESSURE: 70 MMHG | HEART RATE: 70 BPM | TEMPERATURE: 98.6 F | WEIGHT: 254 LBS | BODY MASS INDEX: 43.36 KG/M2 | RESPIRATION RATE: 16 BRPM | OXYGEN SATURATION: 93 %

## 2018-01-01 DIAGNOSIS — E55.9 VITAMIN D DEFICIENCY: Primary | ICD-10-CM

## 2018-01-01 DIAGNOSIS — N18.30 TYPE 2 DIABETES MELLITUS WITH STAGE 3 CHRONIC KIDNEY DISEASE, WITH LONG-TERM CURRENT USE OF INSULIN (HCC): ICD-10-CM

## 2018-01-01 DIAGNOSIS — N18.30 STAGE 3 CHRONIC KIDNEY DISEASE (HCC): ICD-10-CM

## 2018-01-01 DIAGNOSIS — I25.10 CHRONIC CORONARY ARTERY DISEASE: Primary | ICD-10-CM

## 2018-01-01 DIAGNOSIS — E66.01 MORBID OBESITY WITH BMI OF 40.0-44.9, ADULT (HCC): ICD-10-CM

## 2018-01-01 DIAGNOSIS — I49.5 SICK SINUS SYNDROME (HCC): ICD-10-CM

## 2018-01-01 DIAGNOSIS — I25.5 ISCHEMIC CARDIOMYOPATHY: ICD-10-CM

## 2018-01-01 DIAGNOSIS — E78.2 MIXED HYPERLIPIDEMIA: ICD-10-CM

## 2018-01-01 DIAGNOSIS — E55.9 VITAMIN D DEFICIENCY: ICD-10-CM

## 2018-01-01 DIAGNOSIS — R06.02 SHORTNESS OF BREATH: ICD-10-CM

## 2018-01-01 DIAGNOSIS — I50.43 ACUTE ON CHRONIC COMBINED SYSTOLIC AND DIASTOLIC CHF (CONGESTIVE HEART FAILURE) (HCC): Primary | ICD-10-CM

## 2018-01-01 DIAGNOSIS — Z79.4 TYPE 2 DIABETES MELLITUS WITH STAGE 3 CHRONIC KIDNEY DISEASE, WITH LONG-TERM CURRENT USE OF INSULIN (HCC): ICD-10-CM

## 2018-01-01 DIAGNOSIS — Z95.0 H/O CARDIAC PACEMAKER: ICD-10-CM

## 2018-01-01 DIAGNOSIS — IMO0002 UNCONTROLLED TYPE 2 DIABETES MELLITUS WITH COMPLICATION, WITH LONG-TERM CURRENT USE OF INSULIN: ICD-10-CM

## 2018-01-01 DIAGNOSIS — I25.10 CHRONIC CORONARY ARTERY DISEASE: ICD-10-CM

## 2018-01-01 DIAGNOSIS — E11.22 TYPE 2 DIABETES MELLITUS WITH STAGE 3 CHRONIC KIDNEY DISEASE, WITH LONG-TERM CURRENT USE OF INSULIN (HCC): ICD-10-CM

## 2018-01-01 DIAGNOSIS — G47.33 OSA (OBSTRUCTIVE SLEEP APNEA): ICD-10-CM

## 2018-01-01 DIAGNOSIS — K90.9 MALABSORPTION OF IRON: Primary | ICD-10-CM

## 2018-01-01 DIAGNOSIS — D50.8 IRON DEFICIENCY ANEMIA REFRACTORY TO IRON THERAPY: ICD-10-CM

## 2018-01-01 DIAGNOSIS — I10 ESSENTIAL HYPERTENSION: ICD-10-CM

## 2018-01-01 DIAGNOSIS — D50.8 IRON DEFICIENCY ANEMIA REFRACTORY TO IRON THERAPY: Primary | ICD-10-CM

## 2018-01-01 DIAGNOSIS — I48.21 PERMANENT ATRIAL FIBRILLATION (HCC): ICD-10-CM

## 2018-01-01 DIAGNOSIS — N18.30 ANEMIA DUE TO STAGE 3 CHRONIC KIDNEY DISEASE TREATED WITH ERYTHROPOIETIN (HCC): ICD-10-CM

## 2018-01-01 DIAGNOSIS — J44.9 CHRONIC OBSTRUCTIVE PULMONARY DISEASE, UNSPECIFIED COPD TYPE (HCC): ICD-10-CM

## 2018-01-01 DIAGNOSIS — I48.20 CHRONIC ATRIAL FIBRILLATION (HCC): Primary | ICD-10-CM

## 2018-01-01 DIAGNOSIS — I34.0 NON-RHEUMATIC MITRAL REGURGITATION: ICD-10-CM

## 2018-01-01 DIAGNOSIS — D63.1 ANEMIA DUE TO STAGE 3 CHRONIC KIDNEY DISEASE TREATED WITH ERYTHROPOIETIN (HCC): ICD-10-CM

## 2018-01-01 DIAGNOSIS — I48.20 CHRONIC ATRIAL FIBRILLATION (HCC): ICD-10-CM

## 2018-01-01 DIAGNOSIS — Z12.39 SCREENING BREAST EXAMINATION: Primary | ICD-10-CM

## 2018-01-01 DIAGNOSIS — Z12.39 SCREENING BREAST EXAMINATION: ICD-10-CM

## 2018-01-01 DIAGNOSIS — I50.41 ACUTE COMBINED SYSTOLIC AND DIASTOLIC CONGESTIVE HEART FAILURE (HCC): ICD-10-CM

## 2018-01-01 DIAGNOSIS — I44.2 THIRD DEGREE AV BLOCK (HCC): Primary | ICD-10-CM

## 2018-01-01 LAB
25(OH)D3+25(OH)D2 SERPL-MCNC: 34.7 NG/ML (ref 30–100)
ALBUMIN SERPL-MCNC: 4.1 G/DL (ref 3.5–5.2)
ALBUMIN/GLOB SERPL: 1.9 G/DL
ALP SERPL-CCNC: 104 U/L (ref 39–117)
ALT SERPL-CCNC: 9 U/L (ref 1–33)
ANION GAP SERPL CALCULATED.3IONS-SCNC: 12.2 MMOL/L
ASCENDING AORTA: 3.5 CM
AST SERPL-CCNC: 12 U/L (ref 1–32)
BASOPHILS # BLD AUTO: 0.04 10*3/MM3 (ref 0–0.1)
BASOPHILS # BLD AUTO: 0.04 10*3/MM3 (ref 0–0.2)
BASOPHILS NFR BLD AUTO: 0.4 % (ref 0–1.1)
BASOPHILS NFR BLD AUTO: 0.5 % (ref 0–2)
BH CV ECHO MEAS - ACS: 1.6 CM
BH CV ECHO MEAS - AO MAX PG (FULL): 3.4 MMHG
BH CV ECHO MEAS - AO MAX PG: 8.5 MMHG
BH CV ECHO MEAS - AO MEAN PG (FULL): 1 MMHG
BH CV ECHO MEAS - AO MEAN PG: 4 MMHG
BH CV ECHO MEAS - AO ROOT AREA (BSA CORRECTED): 1.3
BH CV ECHO MEAS - AO ROOT AREA: 6.6 CM^2
BH CV ECHO MEAS - AO ROOT DIAM: 2.9 CM
BH CV ECHO MEAS - AO V2 MAX: 146 CM/SEC
BH CV ECHO MEAS - AO V2 MEAN: 87.8 CM/SEC
BH CV ECHO MEAS - AO V2 VTI: 25.6 CM
BH CV ECHO MEAS - AVA(I,A): 2.5 CM^2
BH CV ECHO MEAS - AVA(I,D): 2.5 CM^2
BH CV ECHO MEAS - AVA(V,A): 2.4 CM^2
BH CV ECHO MEAS - AVA(V,D): 2.4 CM^2
BH CV ECHO MEAS - BSA(HAYCOCK): 2.3 M^2
BH CV ECHO MEAS - BSA: 2.2 M^2
BH CV ECHO MEAS - BZI_BMI: 43.8 KILOGRAMS/M^2
BH CV ECHO MEAS - BZI_METRIC_HEIGHT: 162.6 CM
BH CV ECHO MEAS - BZI_METRIC_WEIGHT: 115.7 KG
BH CV ECHO MEAS - CONTRAST EF (2CH): 25.7 ML/M^2
BH CV ECHO MEAS - CONTRAST EF 4CH: 35.2 ML/M^2
BH CV ECHO MEAS - EDV(MOD-SP2): 136 ML
BH CV ECHO MEAS - EDV(MOD-SP4): 165 ML
BH CV ECHO MEAS - EDV(TEICH): 219.8 ML
BH CV ECHO MEAS - EF(CUBED): 48.8 %
BH CV ECHO MEAS - EF(MOD-BP): 30 %
BH CV ECHO MEAS - EF(MOD-SP2): 25.7 %
BH CV ECHO MEAS - EF(MOD-SP4): 35.2 %
BH CV ECHO MEAS - EF(TEICH): 40 %
BH CV ECHO MEAS - ESV(MOD-SP2): 101 ML
BH CV ECHO MEAS - ESV(MOD-SP4): 107 ML
BH CV ECHO MEAS - ESV(TEICH): 131.8 ML
BH CV ECHO MEAS - FS: 20 %
BH CV ECHO MEAS - IVS/LVPW: 0.96
BH CV ECHO MEAS - IVSD: 1.2 CM
BH CV ECHO MEAS - LAT PEAK E' VEL: 5 CM/SEC
BH CV ECHO MEAS - LV DIASTOLIC VOL/BSA (35-75): 76.1 ML/M^2
BH CV ECHO MEAS - LV MASS(C)D: 357.3 GRAMS
BH CV ECHO MEAS - LV MASS(C)DI: 164.7 GRAMS/M^2
BH CV ECHO MEAS - LV MAX PG: 5.1 MMHG
BH CV ECHO MEAS - LV MEAN PG: 3 MMHG
BH CV ECHO MEAS - LV SYSTOLIC VOL/BSA (12-30): 49.3 ML/M^2
BH CV ECHO MEAS - LV V1 MAX: 113 CM/SEC
BH CV ECHO MEAS - LV V1 MEAN: 77.3 CM/SEC
BH CV ECHO MEAS - LV V1 VTI: 20.7 CM
BH CV ECHO MEAS - LVIDD: 6.6 CM
BH CV ECHO MEAS - LVIDS: 5.2 CM
BH CV ECHO MEAS - LVLD AP2: 8.5 CM
BH CV ECHO MEAS - LVLD AP4: 7.8 CM
BH CV ECHO MEAS - LVLS AP2: 8.3 CM
BH CV ECHO MEAS - LVLS AP4: 7.5 CM
BH CV ECHO MEAS - LVOT AREA (M): 3.1 CM^2
BH CV ECHO MEAS - LVOT AREA: 3.1 CM^2
BH CV ECHO MEAS - LVOT DIAM: 2 CM
BH CV ECHO MEAS - LVPWD: 1.2 CM
BH CV ECHO MEAS - MED PEAK E' VEL: 9 CM/SEC
BH CV ECHO MEAS - MR MAX PG: 69.9 MMHG
BH CV ECHO MEAS - MR MAX VEL: 418 CM/SEC
BH CV ECHO MEAS - MV DEC SLOPE: 813 CM/SEC^2
BH CV ECHO MEAS - MV DEC TIME: 0.15 SEC
BH CV ECHO MEAS - MV E MAX VEL: 127 CM/SEC
BH CV ECHO MEAS - MV MAX PG: 8.3 MMHG
BH CV ECHO MEAS - MV MEAN PG: 3 MMHG
BH CV ECHO MEAS - MV P1/2T MAX VEL: 129 CM/SEC
BH CV ECHO MEAS - MV P1/2T: 46.5 MSEC
BH CV ECHO MEAS - MV V2 MAX: 144 CM/SEC
BH CV ECHO MEAS - MV V2 MEAN: 81.5 CM/SEC
BH CV ECHO MEAS - MV V2 VTI: 33.6 CM
BH CV ECHO MEAS - MVA P1/2T LCG: 1.7 CM^2
BH CV ECHO MEAS - MVA(P1/2T): 4.7 CM^2
BH CV ECHO MEAS - MVA(VTI): 1.9 CM^2
BH CV ECHO MEAS - PA MAX PG (FULL): 0.55 MMHG
BH CV ECHO MEAS - PA MAX PG: 3.1 MMHG
BH CV ECHO MEAS - PA V2 MAX: 88.6 CM/SEC
BH CV ECHO MEAS - PVA(V,A): 2.9 CM^2
BH CV ECHO MEAS - PVA(V,D): 2.9 CM^2
BH CV ECHO MEAS - QP/QS: 0.83
BH CV ECHO MEAS - RAP SYSTOLE: 3 MMHG
BH CV ECHO MEAS - RV MAX PG: 2.6 MMHG
BH CV ECHO MEAS - RV MEAN PG: 1 MMHG
BH CV ECHO MEAS - RV V1 MAX: 80.5 CM/SEC
BH CV ECHO MEAS - RV V1 MEAN: 49.6 CM/SEC
BH CV ECHO MEAS - RV V1 VTI: 17.1 CM
BH CV ECHO MEAS - RVOT AREA: 3.1 CM^2
BH CV ECHO MEAS - RVOT DIAM: 2 CM
BH CV ECHO MEAS - RVSP: 43 MMHG
BH CV ECHO MEAS - SI(AO): 78 ML/M^2
BH CV ECHO MEAS - SI(CUBED): 63.2 ML/M^2
BH CV ECHO MEAS - SI(LVOT): 30 ML/M^2
BH CV ECHO MEAS - SI(MOD-SP2): 16.1 ML/M^2
BH CV ECHO MEAS - SI(MOD-SP4): 26.7 ML/M^2
BH CV ECHO MEAS - SI(TEICH): 40.6 ML/M^2
BH CV ECHO MEAS - SUP REN AO DIAM: 2.2 CM
BH CV ECHO MEAS - SV(AO): 169.1 ML
BH CV ECHO MEAS - SV(CUBED): 137.1 ML
BH CV ECHO MEAS - SV(LVOT): 65 ML
BH CV ECHO MEAS - SV(MOD-SP2): 35 ML
BH CV ECHO MEAS - SV(MOD-SP4): 58 ML
BH CV ECHO MEAS - SV(RVOT): 53.7 ML
BH CV ECHO MEAS - SV(TEICH): 88 ML
BH CV ECHO MEAS - TAPSE (>1.6): 1.8 CM2
BH CV ECHO MEAS - TR MAX VEL: 315 CM/SEC
BH CV ECHO MEASUREMENTS AVERAGE E/E' RATIO: 18.14
BH CV XLRA - RV BASE: 3.3 CM
BH CV XLRA - TDI S': 10 CM/SEC
BILIRUB SERPL-MCNC: 0.3 MG/DL (ref 0.1–1.2)
BUN BLD-MCNC: 27 MG/DL (ref 8–23)
BUN SERPL-MCNC: 29 MG/DL (ref 8–23)
BUN/CREAT SERPL: 19.7 (ref 7–25)
BUN/CREAT SERPL: 23.2 (ref 7–25)
C PEPTIDE SERPL-MCNC: 3.2 NG/ML (ref 1.1–4.4)
CALCIUM SERPL-MCNC: 8.9 MG/DL (ref 8.6–10.5)
CALCIUM SPEC-SCNC: 8.9 MG/DL (ref 8.8–10.5)
CHLORIDE SERPL-SCNC: 92 MMOL/L (ref 98–107)
CHLORIDE SERPL-SCNC: 95 MMOL/L (ref 98–107)
CHOLEST SERPL-MCNC: 150 MG/DL (ref 0–200)
CO2 SERPL-SCNC: 29.6 MMOL/L (ref 22–29)
CO2 SERPL-SCNC: 30.8 MMOL/L (ref 22–29)
CREAT BLD-MCNC: 1.37 MG/DL (ref 0.57–1)
CREAT SERPL-MCNC: 1.25 MG/DL (ref 0.57–1)
DEPRECATED RDW RBC AUTO: 54.2 FL (ref 37–54)
DEPRECATED RDW RBC AUTO: 56.5 FL (ref 37–49)
EOSINOPHIL # BLD AUTO: 0.12 10*3/MM3 (ref 0.1–0.3)
EOSINOPHIL # BLD AUTO: 0.13 10*3/MM3 (ref 0–0.36)
EOSINOPHIL NFR BLD AUTO: 1.2 % (ref 1–5)
EOSINOPHIL NFR BLD AUTO: 1.4 % (ref 0–4)
ERYTHROCYTE [DISTWIDTH] IN BLOOD BY AUTOMATED COUNT: 15.7 % (ref 11.5–14.5)
ERYTHROCYTE [DISTWIDTH] IN BLOOD BY AUTOMATED COUNT: 16.4 % (ref 11.7–14.5)
FERRITIN SERPL-MCNC: 58 NG/ML
GFR SERPL CREATININE-BSD FRML MDRD: 37 ML/MIN/1.73
GLOBULIN SER CALC-MCNC: 2.2 GM/DL
GLUCOSE BLD-MCNC: 143 MG/DL (ref 65–99)
GLUCOSE SERPL-MCNC: 346 MG/DL (ref 65–99)
HBA1C MFR BLD: 10.6 % (ref 4.8–5.6)
HCT VFR BLD AUTO: 35.5 % (ref 37–47)
HCT VFR BLD AUTO: 36.5 % (ref 34–45)
HDLC SERPL-MCNC: 55 MG/DL (ref 40–60)
HGB BLD-MCNC: 11.1 G/DL (ref 12–16)
HGB BLD-MCNC: 11.2 G/DL (ref 11.5–14.9)
HGB RETIC QN: 31.6 PG (ref 29.8–36.1)
IMM GRANULOCYTES # BLD: 0.03 10*3/MM3 (ref 0–0.03)
IMM GRANULOCYTES # BLD: 0.05 10*3/MM3 (ref 0–0.03)
IMM GRANULOCYTES NFR BLD: 0.4 % (ref 0–0.5)
IMM GRANULOCYTES NFR BLD: 0.5 % (ref 0–0.5)
IMM RETICS NFR: 19.2 % (ref 3–15.8)
INTERPRETATION: NORMAL
INTERPRETATION: NORMAL
IRON 24H UR-MRATE: 46 MCG/DL (ref 37–145)
IRON SATN MFR SERPL: 13 % (ref 14–48)
LDLC SERPL CALC-MCNC: 64 MG/DL (ref 0–100)
LEFT ATRIUM VOLUME INDEX: 51 ML/M2
LYMPHOCYTES # BLD AUTO: 0.81 10*3/MM3 (ref 1–3.5)
LYMPHOCYTES # BLD AUTO: 0.88 10*3/MM3 (ref 0.6–4.8)
LYMPHOCYTES NFR BLD AUTO: 10.3 % (ref 20–45)
LYMPHOCYTES NFR BLD AUTO: 7.3 % (ref 20–49)
Lab: NORMAL
Lab: NORMAL
MCH RBC QN AUTO: 29.2 PG (ref 27–33)
MCH RBC QN AUTO: 29.7 PG (ref 27–31)
MCHC RBC AUTO-ENTMCNC: 30.7 G/DL (ref 32–35)
MCHC RBC AUTO-ENTMCNC: 31.3 G/DL (ref 31–37)
MCV RBC AUTO: 94.9 FL (ref 81–99)
MCV RBC AUTO: 95.1 FL (ref 83–97)
MICROALBUMIN UR-MCNC: 31.4 UG/ML
MONOCYTES # BLD AUTO: 0.46 10*3/MM3 (ref 0–1)
MONOCYTES # BLD AUTO: 0.56 10*3/MM3 (ref 0.25–0.8)
MONOCYTES NFR BLD AUTO: 5 % (ref 4–12)
MONOCYTES NFR BLD AUTO: 5.4 % (ref 3–8)
NEUTROPHILS # BLD AUTO: 7.04 10*3/MM3 (ref 1.5–8.3)
NEUTROPHILS # BLD AUTO: 9.5 10*3/MM3 (ref 1.5–7)
NEUTROPHILS NFR BLD AUTO: 82 % (ref 45–70)
NEUTROPHILS NFR BLD AUTO: 85.6 % (ref 39–75)
NRBC BLD MANUAL-RTO: 0 /100 WBC (ref 0–0)
NRBC BLD MANUAL-RTO: 0 /100 WBC (ref 0–0)
NT-PROBNP SERPL-MCNC: 3128 PG/ML (ref 5–450)
PLATELET # BLD AUTO: 132 10*3/MM3 (ref 150–375)
PLATELET # BLD AUTO: 161 10*3/MM3 (ref 140–500)
PMV BLD AUTO: 10.9 FL (ref 7.4–10.4)
PMV BLD AUTO: 12 FL (ref 8.9–12.1)
POTASSIUM BLD-SCNC: 4.5 MMOL/L (ref 3.5–5.2)
POTASSIUM SERPL-SCNC: 4.4 MMOL/L (ref 3.5–5.2)
PROT SERPL-MCNC: 6.3 G/DL (ref 6–8.5)
RBC # BLD AUTO: 3.74 10*6/MM3 (ref 4.2–5.4)
RBC # BLD AUTO: 3.84 10*6/MM3 (ref 3.9–5)
RETICS/RBC NFR AUTO: 1.61 % (ref 0.6–2)
SINUS: 3.3 CM
SODIUM BLD-SCNC: 138 MMOL/L (ref 136–145)
SODIUM SERPL-SCNC: 136 MMOL/L (ref 136–145)
STJ: 2.7 CM
TIBC SERPL-MCNC: 346 MCG/DL (ref 249–505)
TRANSFERRIN SERPL-MCNC: 247 MG/DL (ref 200–360)
TRIGL SERPL-MCNC: 157 MG/DL (ref 0–150)
VLDLC SERPL CALC-MCNC: 31.4 MG/DL (ref 5–40)
WBC NRBC COR # BLD: 11.09 10*3/MM3 (ref 4–10)
WBC NRBC COR # BLD: 8.57 10*3/MM3 (ref 4.8–10.8)

## 2018-01-01 PROCEDURE — 82728 ASSAY OF FERRITIN: CPT | Performed by: INTERNAL MEDICINE

## 2018-01-01 PROCEDURE — 77063 BREAST TOMOSYNTHESIS BI: CPT

## 2018-01-01 PROCEDURE — 99214 OFFICE O/P EST MOD 30 MIN: CPT | Performed by: NURSE PRACTITIONER

## 2018-01-01 PROCEDURE — 99214 OFFICE O/P EST MOD 30 MIN: CPT | Performed by: INTERNAL MEDICINE

## 2018-01-01 PROCEDURE — 93279 PRGRMG DEV EVAL PM/LDLS PM: CPT | Performed by: INTERNAL MEDICINE

## 2018-01-01 PROCEDURE — 93294 REM INTERROG EVL PM/LDLS PM: CPT | Performed by: INTERNAL MEDICINE

## 2018-01-01 PROCEDURE — 83540 ASSAY OF IRON: CPT | Performed by: INTERNAL MEDICINE

## 2018-01-01 PROCEDURE — 85046 RETICYTE/HGB CONCENTRATE: CPT | Performed by: INTERNAL MEDICINE

## 2018-01-01 PROCEDURE — 85025 COMPLETE CBC W/AUTO DIFF WBC: CPT | Performed by: INTERNAL MEDICINE

## 2018-01-01 PROCEDURE — 93306 TTE W/DOPPLER COMPLETE: CPT | Performed by: INTERNAL MEDICINE

## 2018-01-01 PROCEDURE — 96374 THER/PROPH/DIAG INJ IV PUSH: CPT | Performed by: INTERNAL MEDICINE

## 2018-01-01 PROCEDURE — 36415 COLL VENOUS BLD VENIPUNCTURE: CPT | Performed by: INTERNAL MEDICINE

## 2018-01-01 PROCEDURE — 77067 SCR MAMMO BI INCL CAD: CPT

## 2018-01-01 PROCEDURE — 83880 ASSAY OF NATRIURETIC PEPTIDE: CPT

## 2018-01-01 PROCEDURE — 93296 REM INTERROG EVL PM/IDS: CPT | Performed by: INTERNAL MEDICINE

## 2018-01-01 PROCEDURE — 63710000001 PROCHLORPERAZINE MALEATE PER 10 MG: Performed by: INTERNAL MEDICINE

## 2018-01-01 PROCEDURE — 80048 BASIC METABOLIC PNL TOTAL CA: CPT | Performed by: INTERNAL MEDICINE

## 2018-01-01 PROCEDURE — 84466 ASSAY OF TRANSFERRIN: CPT | Performed by: INTERNAL MEDICINE

## 2018-01-01 PROCEDURE — 25010000002 FERRIC CARBOXYMALTOSE 750 MG/15ML SOLUTION 15 ML VIAL: Performed by: INTERNAL MEDICINE

## 2018-01-01 PROCEDURE — 93306 TTE W/DOPPLER COMPLETE: CPT

## 2018-01-01 PROCEDURE — 85025 COMPLETE CBC W/AUTO DIFF WBC: CPT

## 2018-01-01 RX ORDER — SODIUM CHLORIDE 9 MG/ML
250 INJECTION, SOLUTION INTRAVENOUS ONCE
Status: COMPLETED | OUTPATIENT
Start: 2018-01-01 | End: 2018-01-01

## 2018-01-01 RX ORDER — CEFDINIR 300 MG/1
300 CAPSULE ORAL 2 TIMES DAILY
Refills: 0 | COMMUNITY
Start: 2018-01-01 | End: 2018-01-01

## 2018-01-01 RX ORDER — PROCHLORPERAZINE MALEATE 10 MG
10 TABLET ORAL ONCE
Status: COMPLETED | OUTPATIENT
Start: 2018-01-01 | End: 2018-01-01

## 2018-01-01 RX ORDER — OMEPRAZOLE 40 MG/1
40 CAPSULE, DELAYED RELEASE ORAL DAILY
COMMUNITY

## 2018-01-01 RX ORDER — POTASSIUM CHLORIDE 20 MEQ/1
20 TABLET, EXTENDED RELEASE ORAL DAILY
Qty: 90 TABLET | Refills: 3 | Status: SHIPPED | OUTPATIENT
Start: 2018-01-01

## 2018-01-01 RX ORDER — CARVEDILOL 25 MG/1
TABLET ORAL
Qty: 180 TABLET | Refills: 1 | Status: SHIPPED | OUTPATIENT
Start: 2018-01-01 | End: 2018-01-01 | Stop reason: SDUPTHER

## 2018-01-01 RX ORDER — DABIGATRAN ETEXILATE 150 MG/1
150 CAPSULE ORAL EVERY 12 HOURS SCHEDULED
Qty: 180 CAPSULE | Refills: 3 | Status: SHIPPED | OUTPATIENT
Start: 2018-01-01 | End: 2018-01-01 | Stop reason: SDUPTHER

## 2018-01-01 RX ORDER — ALBUTEROL SULFATE 2.5 MG/3ML
SOLUTION RESPIRATORY (INHALATION)
Refills: 5 | COMMUNITY
Start: 2018-01-01

## 2018-01-01 RX ORDER — PROCHLORPERAZINE MALEATE 10 MG
10 TABLET ORAL ONCE
Status: CANCELLED
Start: 2018-01-01 | End: 2018-01-01

## 2018-01-01 RX ORDER — ATORVASTATIN CALCIUM 20 MG/1
TABLET, FILM COATED ORAL
Qty: 90 TABLET | Refills: 0 | Status: SHIPPED | OUTPATIENT
Start: 2018-01-01 | End: 2019-01-01 | Stop reason: SDUPTHER

## 2018-01-01 RX ORDER — CARVEDILOL 25 MG/1
25 TABLET ORAL 2 TIMES DAILY WITH MEALS
COMMUNITY
End: 2018-01-01 | Stop reason: SDUPTHER

## 2018-01-01 RX ORDER — PEN NEEDLE, DIABETIC 32GX 5/32"
NEEDLE, DISPOSABLE MISCELLANEOUS
Qty: 100 EACH | Refills: 0 | Status: SHIPPED | OUTPATIENT
Start: 2018-01-01 | End: 2018-01-01 | Stop reason: SDUPTHER

## 2018-01-01 RX ORDER — PEN NEEDLE, DIABETIC 32GX 5/32"
NEEDLE, DISPOSABLE MISCELLANEOUS
Qty: 100 EACH | Refills: 0 | Status: SHIPPED | OUTPATIENT
Start: 2018-01-01 | End: 2018-01-01

## 2018-01-01 RX ORDER — DABIGATRAN ETEXILATE 150 MG/1
150 CAPSULE ORAL EVERY 12 HOURS SCHEDULED
Qty: 60 CAPSULE | Refills: 0 | COMMUNITY
Start: 2018-01-01 | End: 2019-01-01 | Stop reason: SDUPTHER

## 2018-01-01 RX ORDER — SODIUM CHLORIDE 9 MG/ML
250 INJECTION, SOLUTION INTRAVENOUS ONCE
Status: CANCELLED | OUTPATIENT
Start: 2018-01-01

## 2018-01-01 RX ORDER — CARVEDILOL 25 MG/1
TABLET ORAL
Qty: 180 TABLET | Refills: 1 | Status: SHIPPED | OUTPATIENT
Start: 2018-01-01 | End: 2019-01-01 | Stop reason: SDUPTHER

## 2018-01-01 RX ORDER — DABIGATRAN ETEXILATE 150 MG/1
150 CAPSULE ORAL EVERY 12 HOURS SCHEDULED
Qty: 24 CAPSULE | Refills: 0 | COMMUNITY
Start: 2018-01-01 | End: 2018-01-01 | Stop reason: SDUPTHER

## 2018-01-01 RX ORDER — POTASSIUM CHLORIDE 20 MEQ/1
20 TABLET, EXTENDED RELEASE ORAL DAILY
Refills: 0 | COMMUNITY
Start: 2018-01-01 | End: 2018-01-01 | Stop reason: SDUPTHER

## 2018-01-01 RX ADMIN — FERRIC CARBOXYMALTOSE INJECTION 750 MG: 50 INJECTION, SOLUTION INTRAVENOUS at 09:47

## 2018-01-01 RX ADMIN — PROCHLORPERAZINE MALEATE 10 MG: 10 TABLET, FILM COATED ORAL at 09:40

## 2018-01-01 RX ADMIN — SODIUM CHLORIDE 250 ML: 9 INJECTION, SOLUTION INTRAVENOUS at 09:39

## 2018-01-02 ENCOUNTER — CLINICAL SUPPORT NO REQUIREMENTS (OUTPATIENT)
Dept: CARDIOLOGY | Facility: CLINIC | Age: 78
End: 2018-01-02

## 2018-01-02 DIAGNOSIS — I44.2 THIRD DEGREE AV BLOCK (HCC): Primary | ICD-10-CM

## 2018-01-02 PROCEDURE — 93296 REM INTERROG EVL PM/IDS: CPT | Performed by: INTERNAL MEDICINE

## 2018-01-02 PROCEDURE — 93294 REM INTERROG EVL PM/LDLS PM: CPT | Performed by: INTERNAL MEDICINE

## 2018-01-15 RX ORDER — DABIGATRAN ETEXILATE 150 MG/1
150 CAPSULE ORAL EVERY 12 HOURS SCHEDULED
Qty: 56 CAPSULE | Refills: 0 | COMMUNITY
Start: 2018-01-15 | End: 2018-03-21 | Stop reason: SDUPTHER

## 2018-01-17 ENCOUNTER — LAB (OUTPATIENT)
Dept: LAB | Facility: HOSPITAL | Age: 78
End: 2018-01-17

## 2018-01-17 DIAGNOSIS — D63.1 ANEMIA DUE TO STAGE 3 CHRONIC KIDNEY DISEASE TREATED WITH ERYTHROPOIETIN (HCC): ICD-10-CM

## 2018-01-17 DIAGNOSIS — N18.30 ANEMIA DUE TO STAGE 3 CHRONIC KIDNEY DISEASE TREATED WITH ERYTHROPOIETIN (HCC): ICD-10-CM

## 2018-01-17 DIAGNOSIS — D50.8 IRON DEFICIENCY ANEMIA REFRACTORY TO IRON THERAPY: ICD-10-CM

## 2018-01-17 LAB
ANION GAP SERPL CALCULATED.3IONS-SCNC: 6.9 MMOL/L
BACTERIA UR QL AUTO: ABNORMAL /HPF
BASOPHILS # BLD AUTO: 0.04 10*3/MM3 (ref 0–0.1)
BASOPHILS NFR BLD AUTO: 0.4 % (ref 0–1.1)
BILIRUB UR QL STRIP: NEGATIVE
BUN BLD-MCNC: 34 MG/DL (ref 6–20)
BUN/CREAT SERPL: 26 (ref 7.3–30)
CALCIUM SPEC-SCNC: 8.7 MG/DL (ref 8.5–10.2)
CHLORIDE SERPL-SCNC: 96 MMOL/L (ref 98–107)
CLARITY UR: CLEAR
CO2 SERPL-SCNC: 35.1 MMOL/L (ref 22–29)
COLOR UR: YELLOW
CREAT BLD-MCNC: 1.31 MG/DL (ref 0.6–1.1)
CREAT UR-MCNC: 38.5 MG/DL
DEPRECATED RDW RBC AUTO: 55.5 FL (ref 37–49)
EOSINOPHIL # BLD AUTO: 0.14 10*3/MM3 (ref 0–0.36)
EOSINOPHIL NFR BLD AUTO: 1.4 % (ref 1–5)
ERYTHROCYTE [DISTWIDTH] IN BLOOD BY AUTOMATED COUNT: 15.4 % (ref 11.7–14.5)
FERRITIN SERPL-MCNC: 70.9 NG/ML
GFR SERPL CREATININE-BSD FRML MDRD: 39 ML/MIN/1.73
GLUCOSE BLD-MCNC: 162 MG/DL (ref 74–124)
GLUCOSE UR STRIP-MCNC: NEGATIVE MG/DL
HCT VFR BLD AUTO: 35.8 % (ref 34–45)
HGB BLD-MCNC: 11.1 G/DL (ref 11.5–14.9)
HGB RETIC QN: 32.3 PG (ref 29.8–36.1)
HGB UR QL STRIP.AUTO: NEGATIVE
HYALINE CASTS UR QL AUTO: ABNORMAL /LPF
IMM GRANULOCYTES # BLD: 0.03 10*3/MM3 (ref 0–0.03)
IMM GRANULOCYTES NFR BLD: 0.3 % (ref 0–0.5)
IMM RETICS NFR: 16.4 % (ref 3–15.8)
IRON 24H UR-MRATE: 55 MCG/DL (ref 37–145)
IRON SATN MFR SERPL: 17 % (ref 14–48)
KETONES UR QL STRIP: NEGATIVE
LEUKOCYTE ESTERASE UR QL STRIP.AUTO: ABNORMAL
LYMPHOCYTES # BLD AUTO: 0.93 10*3/MM3 (ref 1–3.5)
LYMPHOCYTES NFR BLD AUTO: 9.6 % (ref 20–49)
MAGNESIUM SERPL-MCNC: 1.9 MG/DL (ref 1.8–2.5)
MCH RBC QN AUTO: 30.6 PG (ref 27–33)
MCHC RBC AUTO-ENTMCNC: 31 G/DL (ref 32–35)
MCV RBC AUTO: 98.6 FL (ref 83–97)
MONOCYTES # BLD AUTO: 0.46 10*3/MM3 (ref 0.25–0.8)
MONOCYTES NFR BLD AUTO: 4.7 % (ref 4–12)
NEUTROPHILS # BLD AUTO: 8.1 10*3/MM3 (ref 1.5–7)
NEUTROPHILS NFR BLD AUTO: 83.6 % (ref 39–75)
NITRITE UR QL STRIP: NEGATIVE
NRBC BLD MANUAL-RTO: 0 /100 WBC (ref 0–0)
PH UR STRIP.AUTO: 5.5 [PH] (ref 4.5–8)
PLATELET # BLD AUTO: 151 10*3/MM3 (ref 150–375)
PMV BLD AUTO: 10.7 FL (ref 8.9–12.1)
POTASSIUM BLD-SCNC: 4.3 MMOL/L (ref 3.5–4.7)
PROT UR QL STRIP: NEGATIVE
PROT UR-MCNC: 11 MG/DL
RBC # BLD AUTO: 3.63 10*6/MM3 (ref 3.9–5)
RBC # UR: ABNORMAL /HPF
REF LAB TEST METHOD: ABNORMAL
RETICS/RBC NFR AUTO: 1.34 % (ref 0.6–2)
SODIUM BLD-SCNC: 138 MMOL/L (ref 134–145)
SP GR UR STRIP: <=1.005 (ref 1–1.03)
SQUAMOUS #/AREA URNS HPF: ABNORMAL /HPF
TIBC SERPL-MCNC: 326 MCG/DL (ref 249–505)
TRANSFERRIN SERPL-MCNC: 233 MG/DL (ref 200–360)
UROBILINOGEN UR QL STRIP: ABNORMAL
WBC NRBC COR # BLD: 9.7 10*3/MM3 (ref 4–10)
WBC UR QL AUTO: ABNORMAL /HPF

## 2018-01-17 PROCEDURE — 82728 ASSAY OF FERRITIN: CPT | Performed by: INTERNAL MEDICINE

## 2018-01-17 PROCEDURE — 83735 ASSAY OF MAGNESIUM: CPT | Performed by: INTERNAL MEDICINE

## 2018-01-17 PROCEDURE — 84466 ASSAY OF TRANSFERRIN: CPT | Performed by: INTERNAL MEDICINE

## 2018-01-17 PROCEDURE — 85046 RETICYTE/HGB CONCENTRATE: CPT | Performed by: INTERNAL MEDICINE

## 2018-01-17 PROCEDURE — 83540 ASSAY OF IRON: CPT | Performed by: INTERNAL MEDICINE

## 2018-01-17 PROCEDURE — 80048 BASIC METABOLIC PNL TOTAL CA: CPT | Performed by: INTERNAL MEDICINE

## 2018-01-17 PROCEDURE — 82570 ASSAY OF URINE CREATININE: CPT | Performed by: INTERNAL MEDICINE

## 2018-01-17 PROCEDURE — 84156 ASSAY OF PROTEIN URINE: CPT | Performed by: INTERNAL MEDICINE

## 2018-01-17 PROCEDURE — 85025 COMPLETE CBC W/AUTO DIFF WBC: CPT | Performed by: INTERNAL MEDICINE

## 2018-01-17 PROCEDURE — 36415 COLL VENOUS BLD VENIPUNCTURE: CPT | Performed by: INTERNAL MEDICINE

## 2018-01-17 PROCEDURE — 81001 URINALYSIS AUTO W/SCOPE: CPT | Performed by: INTERNAL MEDICINE

## 2018-01-26 ENCOUNTER — APPOINTMENT (OUTPATIENT)
Dept: LAB | Facility: HOSPITAL | Age: 78
End: 2018-01-26

## 2018-01-26 ENCOUNTER — OFFICE VISIT (OUTPATIENT)
Dept: ONCOLOGY | Facility: CLINIC | Age: 78
End: 2018-01-26

## 2018-01-26 VITALS
HEIGHT: 64 IN | RESPIRATION RATE: 16 BRPM | DIASTOLIC BLOOD PRESSURE: 80 MMHG | WEIGHT: 245.4 LBS | SYSTOLIC BLOOD PRESSURE: 130 MMHG | BODY MASS INDEX: 41.89 KG/M2 | TEMPERATURE: 98.3 F | HEART RATE: 76 BPM

## 2018-01-26 DIAGNOSIS — D50.8 IRON DEFICIENCY ANEMIA REFRACTORY TO IRON THERAPY: Primary | ICD-10-CM

## 2018-01-26 PROCEDURE — G0463 HOSPITAL OUTPT CLINIC VISIT: HCPCS | Performed by: INTERNAL MEDICINE

## 2018-01-26 PROCEDURE — 99215 OFFICE O/P EST HI 40 MIN: CPT | Performed by: INTERNAL MEDICINE

## 2018-01-26 RX ORDER — FLUTICASONE PROPIONATE 50 MCG
2 SPRAY, SUSPENSION (ML) NASAL DAILY
COMMUNITY

## 2018-01-26 RX ORDER — ALLOPURINOL 300 MG/1
300 TABLET ORAL DAILY
COMMUNITY
End: 2018-07-10 | Stop reason: SDUPTHER

## 2018-01-26 RX ORDER — LEVOCETIRIZINE DIHYDROCHLORIDE 5 MG/1
5 TABLET, FILM COATED ORAL EVERY EVENING
COMMUNITY

## 2018-01-26 RX ORDER — ATORVASTATIN CALCIUM 20 MG/1
20 TABLET, FILM COATED ORAL DAILY
COMMUNITY
End: 2018-03-21 | Stop reason: SDUPTHER

## 2018-01-26 RX ORDER — DIPHENHYDRAMINE HCL 25 MG
25 TABLET ORAL EVERY 6 HOURS PRN
COMMUNITY

## 2018-01-26 NOTE — PROGRESS NOTES
Subjective .     REASONS FOR FOLLOWUP:  Anemia, cancer    HISTORY OF PRESENT ILLNESS:  The patient is a 77 y.o. year old female  who is here for follow-up with the above-mentioned history.    Denies chest pain.  No change in baseline shortness of air.  No change in baseline dizziness.  Denies bleeding.    Past Medical History:   Diagnosis Date   • Abdominal aortic aneurysm    • Acute bronchitis    • Acute combined systolic and diastolic congestive heart failure    • Anemia     Secondary to stage 3 chronic kidney disease.   • Aneurysm of abdominal aorta    • Anxiety    • Aortic aneurysm    • Arteriosclerotic coronary artery disease    • Atrial fibrillation    • Back pain    • Breast cancer     Stage I, status post lumpectomy   • CAD (coronary artery disease)    • Cardiomyopathy    • CHF (congestive heart failure)    • Chronic kidney disease    • Colon cancer     Stage I, resected 01/27/2014   • Common cold    • Congestive heart failure    • COPD (chronic obstructive pulmonary disease)    • Coronary artery disease    • Cough    • Diabetes mellitus     type 2   • Dizziness    • Dyspnea    • Esophageal reflux    • Heart attack    • Hyperlipidemia    • Hypertension    • Iron deficiency anemia secondary to inadequate dietary iron intake    • Joint pain     IN THE RIGHT KNEE   • Mass of adrenal gland     lesion solitary, CT thereof   • Morbid obesity    • Neoplasm of adrenal gland    • Obstructive sleep apnea    • Oral thrush    • HAYDEN on CPAP    • Osteoarthritis    • Pneumonia     onset date: 01 Mar 2011, Severe pneumonia w mechanical ventilation, treated at [facility]. Patricia Britton   • Postoperative visit    • Radiation    • Sick sinus syndrome    • Strong family history of breast cancer    • Type 2 diabetes mellitus    • Ventral hernia    • Vitamin D deficiency        HEMATOLOGIC/ONCOLOGIC HISTORY:  (History from previous dates can be found in the separate document.)    MEDICATIONS    Current Outpatient Prescriptions:   •   ADVAIR DISKUS 250-50 MCG/DOSE DISKUS, Inhale 1 puff 2 (two) times a day., Disp: , Rfl:   •  allopurinol (ZYLOPRIM) 300 MG tablet, Take 300 mg by mouth Daily., Disp: , Rfl:   •  atorvastatin (LIPITOR) 20 MG tablet, Take 20 mg by mouth Daily., Disp: , Rfl:   •  BD PEN NEEDLE CHRIS U/F 32G X 4 MM misc, USE AS DIRECTED, Disp: 100 each, Rfl: 0  •  carvedilol (COREG) 25 MG tablet, TAKE 1/2 TABLET BY MOUTH TWICE DAILY, TAKE MORNING AND AT NOON. extra ONE-HALF TABLET AS NEEDED, Disp: 180 tablet, Rfl: 1  •  dabigatran etexilate (PRADAXA) 150 MG capsu, Take 1 capsule by mouth Every 12 (Twelve) Hours., Disp: 56 capsule, Rfl: 0  •  diphenhydrAMINE (BENADRYL) 25 MG tablet, Take 25 mg by mouth Every 6 (Six) Hours As Needed for Itching., Disp: , Rfl:   •  famotidine (PEPCID) 20 MG tablet, Take 1 tablet by mouth 2 (Two) Times a Day., Disp: , Rfl: 1  •  fluticasone (FLONASE) 50 MCG/ACT nasal spray, 2 sprays into each nostril Daily., Disp: , Rfl:   •  furosemide (LASIX) 40 MG tablet, TAKE 1 TABLETS BY MOUTH every morning AND 1 and a half tablets BY MOUTH at night, Disp: 450 tablet, Rfl: 2  •  Insulin Glargine 300 UNIT/ML solution pen-injector, Inject 40 Units under the skin Daily., Disp: 9 pen, Rfl: 3  •  levocetirizine (XYZAL) 5 MG tablet, Take 5 mg by mouth Every Evening., Disp: , Rfl:   •  Melatonin 10 MG capsule, Take 1 capsule by mouth every night., Disp: , Rfl:   •  nitroglycerin (NITROSTAT) 0.4 MG SL tablet, Place 1 tablet under the tongue Every 5 (Five) Minutes As Needed for chest pain., Disp: 20 tablet, Rfl: 0  •  NON FORMULARY, TROUJEO INSULIN PEN, Disp: , Rfl:     ALLERGIES:     Allergies   Allergen Reactions   • Codeine Itching     Edema   • Hydralazine Hcl      BP drops.   • Lisinopril Cough     Edema       SOCIAL HISTORY:       Social History     Social History   • Marital status:      Spouse name: N/A   • Number of children: N/A   • Years of education: N/A     Occupational History   • Retired      Social History  "Main Topics   • Smoking status: Former Smoker     Packs/day: 1.00     Years: 25.00     Quit date: 2004   • Smokeless tobacco: Never Used      Comment: Daily caffeine use   • Alcohol use Yes      Comment: seldom   • Drug use: No   • Sexual activity: Defer     Other Topics Concern   • Not on file     Social History Narrative         FAMILY HISTORY:  Family History   Problem Relation Age of Onset   • Breast cancer Mother 60   • Breast cancer Sister 50   • Arrhythmia Sister    • Hypertension Sister    • Brain cancer Father 38   • Breast cancer Sister 40   • Heart attack Brother    • Hypertension Brother    • Diabetes Son    • Hypertension Son    • Heart disease Maternal Grandmother        REVIEW OF SYSTEMS:  GENERAL: No change in appetite or weight;   No fevers, chills, sweats.    SKIN: No nonhealing lesions.   No rashes.  HEME/LYMPH: No easy bruising, bleeding.   No swollen nodes.   EYES: No vision changes or diplopia.   ENT: No tinnitus, hearing loss, gum bleeding, epistaxis, hoarseness or dysphagia.   RESPIRATORY: No cough, shortness of breath, hemoptysis or wheezing.   CVS: No chest pain, palpitations, orthopnea, dyspnea on exertion or PND.   GI: No melena or hematochezia.   No abdominal pain.  No nausea, vomiting, constipation, diarrhea  : No lower tract obstructive symptoms, dysuria or hematuria.   MUSCULOSKELETAL: No bone pain.  No joint stiffness.   NEUROLOGICAL: see HPI   PSYCHIATRIC: No increased nervousness, mood changes or depression.         Objective    Vitals:    01/26/18 1041   BP: 130/80   Pulse: 76   Resp: 16   Temp: 98.3 °F (36.8 °C)   Weight: 111 kg (245 lb 6.4 oz)   Height: 162 cm (63.78\")  Comment: new ht.   PainSc: 0-No pain     Current Status 8/11/2017   ECOG score 0      PHYSICAL EXAM:    GENERAL:  Well-developed, well-nourished in no acute distress.   SKIN:  Warm, dry without rashes, purpura or petechiae.  HEAD:  Normocephalic.  EYES:  Pupils equal, round and reactive to light.  EOMs intact.  " Conjunctivae normal.  EARS:  Hearing intact.  NOSE:  Septum midline.  No excoriations or nasal discharge.  MOUTH:  Tongue is well-papillated; no stomatitis or ulcers.  Lips normal.  THROAT:  Oropharynx without lesions or exudates.  NECK:  Supple with good range of motion; no thyromegaly or masses, no JVD.  LYMPHATICS:  No cervical, supraclavicular, axillary or inguinal adenopathy.  CHEST:  Lungs clear to percussion and auscultation. Good airflow.  Declines breast exam  CARDIAC:  Regular rate and rhythm without murmurs, rubs or gallops. Normal S1,S2.  ABDOMEN:  Soft, nontender with no organomegaly or masses.  EXTREMITIES:  No clubbing, cyanosis or edema.  NEUROLOGICAL:  Cranial Nerves II-XII grossly intact.  No focal neurological deficits.  PSYCHIATRIC:  Normal affect and mood.      RECENT LABS:        WBC   Date/Time Value Ref Range Status   01/17/2018 10:53 AM 9.70 4.00 - 10.00 10*3/mm3 Final     Hemoglobin   Date/Time Value Ref Range Status   01/17/2018 10:53 AM 11.1 (L) 11.5 - 14.9 g/dL Final     Platelets   Date/Time Value Ref Range Status   01/17/2018 10:53  150 - 375 10*3/mm3 Final       Assessment/Plan   Iron deficiency anemia refractory to iron therapy  - Ferritin  - Iron Profile  - Retic With IRF & RET-He  - CBC & Differential    ASSESSMENT:  1.  Iron deficiency anemia. Intolerant and does not respond to oral iron, has used Feraheme. She follows with Dr. Gautam   Recent iron studies normal.    2. Anemia secondary to stage 3 chronic kidney disease. Procrit started 11/03/2015.   Has not needed Procrit recently.    3. Stage I breast cancer. She completed 5 years of tamoxifen in 2005.   Last mammogram 9/21/17, BIRADS 2.     4. Stage I colon adenocarcinom a, resected 01/27/2014. No adjuvant chemotherapy indicated. Follows with Dr. Gautam regularly.     5. Difficult IV stick. She states she has lots of issues with this. I told her at some point she may need a port to have access for IV infusions and blood  trans fusions. We discussed some of the risks with this. For now, we will hold off, but if the IV sticks become more difficult, we may need to reconsider this.     6.  Intermittent yeast infection at the folds of her skin under her breasts bilaterally, intermittently. She thinks nystatin cream works better than nystatin powder. She uses Desitin as well.     7. Chronic kidney disease. Creatinine 0.8 and 1.2 on lab checks from 2013. Creatinine was noted to be 1.8 on 10/08/2015 and 11/03/2015. She is now seeing Dr. Isha Kraus of Nephrology for further management of this as well as her hyperkalemia and blood pressure issues.     PLAN:   · M.D. 6 months.  Iron labs 1 week before.  · (Previously had every 2 month labs.  No plans for Procrit as she has not needed this for several months.  Was previously receiving Procrit if Hb <10).  · Defer further BMP assessments to her other providers.   · Last mammogram 9/21/17, BI-RADS 2.    Daughter assisted with history.

## 2018-02-19 ENCOUNTER — TELEPHONE (OUTPATIENT)
Dept: CARDIOLOGY | Facility: CLINIC | Age: 78
End: 2018-02-19

## 2018-02-19 NOTE — TELEPHONE ENCOUNTER
Pt called to report that the pradaxa 150mg cost has went up to $209 and she can not afford that. The pharmacy told her that her cheapest that is even close to the pradaxa was Jantoven, which would only cost her a $1. Pt wanted to know if she can switch to that? Pls advise or call [phone].

## 2018-02-19 NOTE — TELEPHONE ENCOUNTER
S/w pt. Pt will stay on Pradaxa and I'll give her a month supply of Pradaxa for now......Charissa THOMPSON

## 2018-02-23 ENCOUNTER — TRANSCRIBE ORDERS (OUTPATIENT)
Dept: ADMINISTRATIVE | Facility: HOSPITAL | Age: 78
End: 2018-02-23

## 2018-02-23 DIAGNOSIS — N28.1 RENAL CYST: ICD-10-CM

## 2018-02-23 DIAGNOSIS — N18.30 CHRONIC KIDNEY DISEASE, STAGE III (MODERATE) (HCC): Primary | ICD-10-CM

## 2018-02-28 RX ORDER — NITROGLYCERIN 0.4 MG/1
TABLET SUBLINGUAL
Qty: 25 TABLET | Refills: 0 | Status: SHIPPED | OUTPATIENT
Start: 2018-02-28 | End: 2019-01-01 | Stop reason: SDUPTHER

## 2018-03-21 ENCOUNTER — OFFICE VISIT (OUTPATIENT)
Dept: CARDIOLOGY | Facility: CLINIC | Age: 78
End: 2018-03-21

## 2018-03-21 ENCOUNTER — TELEPHONE (OUTPATIENT)
Dept: CARDIOLOGY | Facility: CLINIC | Age: 78
End: 2018-03-21

## 2018-03-21 ENCOUNTER — LAB (OUTPATIENT)
Dept: LAB | Facility: HOSPITAL | Age: 78
End: 2018-03-21

## 2018-03-21 VITALS
SYSTOLIC BLOOD PRESSURE: 130 MMHG | BODY MASS INDEX: 41.38 KG/M2 | HEIGHT: 65 IN | WEIGHT: 248.4 LBS | HEART RATE: 72 BPM | DIASTOLIC BLOOD PRESSURE: 70 MMHG

## 2018-03-21 DIAGNOSIS — I25.5 ISCHEMIC CARDIOMYOPATHY: Primary | ICD-10-CM

## 2018-03-21 DIAGNOSIS — I34.0 NON-RHEUMATIC MITRAL REGURGITATION: ICD-10-CM

## 2018-03-21 DIAGNOSIS — I10 ESSENTIAL HYPERTENSION: ICD-10-CM

## 2018-03-21 DIAGNOSIS — E66.01 MORBID OBESITY WITH BMI OF 40.0-44.9, ADULT (HCC): ICD-10-CM

## 2018-03-21 DIAGNOSIS — I25.10 CHRONIC CORONARY ARTERY DISEASE: ICD-10-CM

## 2018-03-21 DIAGNOSIS — R06.02 SHORTNESS OF BREATH: ICD-10-CM

## 2018-03-21 DIAGNOSIS — G47.33 OSA (OBSTRUCTIVE SLEEP APNEA): ICD-10-CM

## 2018-03-21 DIAGNOSIS — I25.5 ISCHEMIC CARDIOMYOPATHY: ICD-10-CM

## 2018-03-21 DIAGNOSIS — E78.5 HYPERLIPIDEMIA, UNSPECIFIED HYPERLIPIDEMIA TYPE: ICD-10-CM

## 2018-03-21 DIAGNOSIS — I48.21 PERMANENT ATRIAL FIBRILLATION (HCC): ICD-10-CM

## 2018-03-21 DIAGNOSIS — Z95.0 H/O CARDIAC PACEMAKER: ICD-10-CM

## 2018-03-21 LAB
ALBUMIN SERPL-MCNC: 3.9 G/DL (ref 3.5–5.2)
ALBUMIN/GLOB SERPL: 1.4 G/DL
ALP SERPL-CCNC: 86 U/L (ref 39–117)
ALT SERPL W P-5'-P-CCNC: 13 U/L (ref 1–33)
ANION GAP SERPL CALCULATED.3IONS-SCNC: 12.8 MMOL/L
AST SERPL-CCNC: 14 U/L (ref 1–32)
BILIRUB SERPL-MCNC: 0.3 MG/DL (ref 0.1–1.2)
BUN BLD-MCNC: 33 MG/DL (ref 8–23)
BUN/CREAT SERPL: 24.1 (ref 7–25)
CALCIUM SPEC-SCNC: 8.6 MG/DL (ref 8.6–10.5)
CHLORIDE SERPL-SCNC: 95 MMOL/L (ref 98–107)
CO2 SERPL-SCNC: 33.2 MMOL/L (ref 22–29)
CREAT BLD-MCNC: 1.37 MG/DL (ref 0.57–1)
DEPRECATED RDW RBC AUTO: 56 FL (ref 37–54)
ERYTHROCYTE [DISTWIDTH] IN BLOOD BY AUTOMATED COUNT: 15.9 % (ref 11.7–13)
GFR SERPL CREATININE-BSD FRML MDRD: 37 ML/MIN/1.73
GLOBULIN UR ELPH-MCNC: 2.8 GM/DL
GLUCOSE BLD-MCNC: 208 MG/DL (ref 65–99)
HCT VFR BLD AUTO: 35.4 % (ref 35.6–45.5)
HGB BLD-MCNC: 11 G/DL (ref 11.9–15.5)
MCH RBC QN AUTO: 30.2 PG (ref 26.9–32)
MCHC RBC AUTO-ENTMCNC: 31.1 G/DL (ref 32.4–36.3)
MCV RBC AUTO: 97.3 FL (ref 80.5–98.2)
PLATELET # BLD AUTO: 158 10*3/MM3 (ref 140–500)
PMV BLD AUTO: 11.4 FL (ref 6–12)
POTASSIUM BLD-SCNC: 4.5 MMOL/L (ref 3.5–5.2)
PROT SERPL-MCNC: 6.7 G/DL (ref 6–8.5)
RBC # BLD AUTO: 3.64 10*6/MM3 (ref 3.9–5.2)
SODIUM BLD-SCNC: 141 MMOL/L (ref 136–145)
WBC NRBC COR # BLD: 10.47 10*3/MM3 (ref 4.5–10.7)

## 2018-03-21 PROCEDURE — 36415 COLL VENOUS BLD VENIPUNCTURE: CPT | Performed by: INTERNAL MEDICINE

## 2018-03-21 PROCEDURE — 99214 OFFICE O/P EST MOD 30 MIN: CPT | Performed by: INTERNAL MEDICINE

## 2018-03-21 PROCEDURE — 80053 COMPREHEN METABOLIC PANEL: CPT | Performed by: INTERNAL MEDICINE

## 2018-03-21 PROCEDURE — 85027 COMPLETE CBC AUTOMATED: CPT

## 2018-03-21 RX ORDER — DABIGATRAN ETEXILATE 150 MG/1
150 CAPSULE ORAL EVERY 12 HOURS SCHEDULED
Qty: 56 CAPSULE | Refills: 0 | COMMUNITY
Start: 2018-03-21 | End: 2018-05-01 | Stop reason: SDUPTHER

## 2018-03-21 RX ORDER — AMOXICILLIN 500 MG/1
500 TABLET, FILM COATED ORAL 2 TIMES DAILY
Refills: 0 | COMMUNITY
Start: 2018-03-15 | End: 2018-07-02

## 2018-03-21 RX ORDER — GLIMEPIRIDE 2 MG/1
2 TABLET ORAL EVERY MORNING
Refills: 11 | COMMUNITY
Start: 2018-02-14 | End: 2018-04-25 | Stop reason: SDUPTHER

## 2018-03-21 RX ORDER — IPRATROPIUM BROMIDE AND ALBUTEROL SULFATE 2.5; .5 MG/3ML; MG/3ML
SOLUTION RESPIRATORY (INHALATION)
Refills: 5 | COMMUNITY
Start: 2018-03-15

## 2018-03-21 RX ORDER — ATORVASTATIN CALCIUM 20 MG/1
20 TABLET, FILM COATED ORAL DAILY
Qty: 90 TABLET | Refills: 0 | Status: SHIPPED | OUTPATIENT
Start: 2018-03-21 | End: 2018-01-01 | Stop reason: SDUPTHER

## 2018-03-21 NOTE — PROGRESS NOTES
Date of Office Visit: 2018  Encounter Provider: Rhiannon Rios MD  Place of Service: [facility] CARDIOLOGY  Patient Name: Renuka Patel  :1940      Patient ID:  Renuka Patel is a 77 y.o. female is here for  followup for CMP, CAD and atrial fibrillation.         History of Present Illness  I first saw her in 2010 for atrial fibrillation which was new  onset. We attempted a cardioversion but she developed severe bradycardia  after that, and required a pacemaker implantation which was performed on  2011. The second attempt to cardiovert her in 2011 was unsuccessful.    She has been on Coumadin and rate controlled since that time.    She was admitted to [facility] for acute respiratory  failure on 2011 requiring mechanical ventilation secondary to  severe pneumonia. She went into congestive heart failure and atrial  fibrillation with rapid ventricular response. The hospitalization was long  and complex.         I then saw her in the spring of 2012. She was having episodes of dyspnea on  exertion as well as lightheadedness and a lot of fatigue. As it turns out,  by interrogating her pacemaker, we found episodes of atrial fibrillation  with rapid ventricular response. She underwent AV node ablation for atrial  fibrillation with rapid ventricular response on 2012 with Dr. Bear Rodrigues and she did great after that. She became ill however; and  presented to [facility] on 2012. She had  accelerated hypertension and pulmonary edema. She was diuresed and her  blood pressure medications were adjusted.               On 2013, she had a colonoscopy with Dr. Gautam, which showed an ulcerated mass in the  sigmoid colon, which turned out to be positive for colon cancer.  After her colonoscopy then, on  2013, she presented to [facility] with prolonged shoulder  discomfort and  wound up ruling in for a non-ST elevation myocardial infarction. She went  on for cardiac catheterization with Dr. Mayfield. This showed normal left main coronary  artery, normal non-dominant left circumflex vessel, 99% proximal LAD stenosis going into  the mid-LAD involving the origin of the second diagonal branch with a 75% ostial second  diagonal stenosis. The first diagonal branch was normal. The right coronary artery showed  50% at the posterolateral arteries. Her ejection fraction was reduced at 35-40%. She went  on to receive a 2.25 x 15 mm drug-eluting stent to the second diagonal branch of the left  anterior descending artery and then she received a 2.75 x 18 mm long Xience drug-eluting  stent into the midleft anterior descending artery. Then she received a 3.5 x 23 mm long  Xience drug-eluting stent in the proximal left anterior descending artery.       She had an echocardiogram on 2013 which revealed an ejection  fraction of 53%, apical wall hypokinesis and moderate mitral insufficiency,  moderate-to-severe tricuspid insufficiency and grade III diastolic dysfunction. She also  had a PET stress study done on 2013 which revealed a moderate area of myocardial  infarction in the apical wall with no ischemia. There was apical akinesis and the  ejection fraction was 36%. She had a CT of the abdomen and pelvis on 2014 showing  no metastatic disease, sigmoid thickening with malignancy there and a 3.6 cm infrarenal  abdominal aortic aneurysm. She has an adrenal adenoma which really is unchanged.    Her  Claudio  around 2013 and this has been very  difficult for her.       She had her colon surgery which was done for colon cancer with Dr. Bernal 2014.  At a semiurgent visit on 2014, she was  short-winded and had edema. It turned out she was quite anemic, and her blood pressure  was low so we decreased her Diovan to 80 mg daily and stopped her Effient.       She saw   Juaquin for her sleep apnea. They  decreased the pressures on her CPAP mask so she is able to wear it for more than three  hours a night.      She has developed an abdominal wall hernia but it is not causing her any pain, so she is going to wear a binder for now.      She had an echocardiogram last done in 04/2015. This showed right ventricular systolic pressure  elevated at 54 mmHg, mild tricuspid insufficiency, grade 2 diastolic dysfunction, moderate  left ventricular dilation, mild concentric left ventricular hypertrophy, ejection fraction  of 54% with basilar lateral and basilar anterior akinesis and aneurysm. There was severe  biatrial enlargement and mild to moderate mitral insufficiency.       She was admitted on 08/24/2016, with acute decompensating congestive heart failure, both systolic and diastolic in nature. She had an echocardiogram, which showed her ejection fraction had dropped to 32%. She was treated with IV diuretics. She was very weak and so physical therapy was also consulted on the case. She ruled out for myocardial infarction but did not have a stress study done during the hospitalization because she needed a PET stress study and the PET camera was in repair.      She had a PET stress test done 09/2016 showing ejection fraction of 37% and no evidence of ischemia.      She is having more fatigue and dyspnea.  She's had no weight gain.  She has orthopnea.  She's had a lot of drainage from her sinuses and cough.  She denies fevers.  She has no chest pain.  She's had no dizziness or syncope.  Her blood pressure and blood sugars been well-controlled.  She's had no vomiting blood or blood in the stool.  She does seem pale today.    Past Medical History:   Diagnosis Date   • Abdominal aortic aneurysm    • Acute bronchitis    • Acute combined systolic and diastolic congestive heart failure    • Anemia     Secondary to stage 3 chronic kidney disease.   • Aneurysm of abdominal aorta    • Anxiety    • Aortic  aneurysm    • Arteriosclerotic coronary artery disease    • Atrial fibrillation    • Back pain    • Breast cancer     Stage I, status post lumpectomy   • CAD (coronary artery disease)    • Cardiomyopathy    • CHF (congestive heart failure)    • Chronic kidney disease    • Colon cancer     Stage I, resected 01/27/2014   • Common cold    • Congestive heart failure    • COPD (chronic obstructive pulmonary disease)    • Coronary artery disease    • Cough    • Diabetes mellitus     type 2   • Dizziness    • Dyspnea    • Esophageal reflux    • Heart attack    • Hyperlipidemia    • Hypertension    • Iron deficiency anemia secondary to inadequate dietary iron intake    • Joint pain     IN THE RIGHT KNEE   • Mass of adrenal gland     lesion solitary, CT thereof   • Morbid obesity    • Neoplasm of adrenal gland    • Obstructive sleep apnea    • Oral thrush    • HAYDEN on CPAP    • Osteoarthritis    • Pneumonia     onset date: 01 Mar 2011, Severe pneumonia w mechanical ventilation, treated at [facility]  Reba   • Postoperative visit    • Radiation    • Sick sinus syndrome    • Strong family history of breast cancer    • Type 2 diabetes mellitus    • Ventral hernia    • Vitamin D deficiency          Past Surgical History:   Procedure Laterality Date   • BACK SURGERY     • BREAST BIOPSY     • BREAST LUMPECTOMY     • BREAST SURGERY     • CARDIAC PACEMAKER PLACEMENT     • COLON SURGERY  01/27/2014    resection for colon cancer   • HAND SURGERY     • HYSTERECTOMY     • TIBIA FRACTURE SURGERY      left   • TOE SURGERY      bilat all toenails removed x3 r/t fungus       Current Outpatient Prescriptions on File Prior to Visit   Medication Sig Dispense Refill   • ADVAIR DISKUS 250-50 MCG/DOSE DISKUS Inhale 1 puff 2 (two) times a day.     • allopurinol (ZYLOPRIM) 300 MG tablet Take 300 mg by mouth Daily.     • atorvastatin (LIPITOR) 20 MG tablet Take 20 mg by mouth Daily.     • BD PEN NEEDLE CHRIS U/F 32G X 4 MM misc USE AS DIRECTED  100 each 0   • carvedilol (COREG) 25 MG tablet TAKE 1/2 TABLET BY MOUTH TWICE DAILY, TAKE MORNING AND AT NOON. extra ONE-HALF TABLET AS NEEDED (Patient taking differently: TAKE 1 TABLET BY MOUTH TWICE DAILY, TAKE MORNING AND AT NOON. extra ONE-HALF TABLET AS NEEDED) 180 tablet 1   • diphenhydrAMINE (BENADRYL) 25 MG tablet Take 25 mg by mouth Every 6 (Six) Hours As Needed for Itching.     • famotidine (PEPCID) 20 MG tablet Take 1 tablet by mouth 2 (Two) Times a Day.  1   • fluticasone (FLONASE) 50 MCG/ACT nasal spray 2 sprays into each nostril Daily.     • furosemide (LASIX) 40 MG tablet TAKE 1 TABLETS BY MOUTH every morning AND 1 and a half tablets BY MOUTH at night 450 tablet 2   • Insulin Glargine 300 UNIT/ML solution pen-injector Inject 40 Units under the skin Daily. 9 pen 2   • levocetirizine (XYZAL) 5 MG tablet Take 5 mg by mouth Every Evening.     • Melatonin 10 MG capsule Take 1 capsule by mouth every night.     • nitroglycerin (NITROSTAT) 0.4 MG SL tablet Place 1 tablet under the tongue Every 5 (Five) Minutes As Needed for chest pain. 25 tablet 0   • NON FORMULARY TROUJEO INSULIN PEN     • [DISCONTINUED] dabigatran etexilate (PRADAXA) 150 MG capsu Take 1 capsule by mouth Every 12 (Twelve) Hours. 56 capsule 0     No current facility-administered medications on file prior to visit.        Social History     Social History   • Marital status:      Spouse name: N/A   • Number of children: N/A   • Years of education: N/A     Occupational History   • Retired      Social History Main Topics   • Smoking status: Former Smoker     Packs/day: 1.00     Years: 25.00     Quit date: 2004   • Smokeless tobacco: Never Used      Comment: Daily caffeine use   • Alcohol use Yes      Comment: seldom   • Drug use: No   • Sexual activity: Defer     Other Topics Concern   • Not on file     Social History Narrative   • No narrative on file           Review of Systems   Constitution: Positive for malaise/fatigue.   HENT:  "Negative for congestion.    Eyes: Negative for vision loss in left eye and vision loss in right eye.   Cardiovascular: Positive for dyspnea on exertion.   Respiratory: Positive for cough. Negative for hemoptysis, shortness of breath, sleep disturbances due to breathing, snoring, sputum production and wheezing.    Endocrine: Negative.    Hematologic/Lymphatic: Negative.    Skin: Negative for poor wound healing and rash.   Musculoskeletal: Negative for falls, gout, muscle cramps and myalgias.   Gastrointestinal: Negative for abdominal pain, diarrhea, dysphagia, hematemesis, melena, nausea and vomiting.   Neurological: Negative for excessive daytime sleepiness, dizziness, headaches, light-headedness, loss of balance, seizures and vertigo.   Psychiatric/Behavioral: Negative for depression and substance abuse. The patient is not nervous/anxious.        Procedures  Procedures        Objective:      Vitals:    03/21/18 1032   BP: 130/70   BP Location: Left arm   Patient Position: Sitting   Pulse: 72   Weight: 113 kg (248 lb 6.4 oz)   Height: 165.1 cm (65\")     Body mass index is 41.34 kg/m².    Physical Exam   Constitutional: She is oriented to person, place, and time. She appears well-developed and well-nourished. No distress.   HENT:   Head: Normocephalic and atraumatic.   Eyes: Conjunctivae are normal. No scleral icterus.   Neck: Neck supple. No JVD present. Carotid bruit is not present. No thyromegaly present.   Cardiovascular: Normal rate, S1 normal, S2 normal, normal heart sounds and intact distal pulses.  An irregularly irregular rhythm present.  No extrasystoles are present. PMI is not displaced.    No murmur heard.  Pulses:       Carotid pulses are 2+ on the right side, and 2+ on the left side.       Radial pulses are 2+ on the right side, and 2+ on the left side.        Dorsalis pedis pulses are 2+ on the right side, and 2+ on the left side.        Posterior tibial pulses are 2+ on the right side, and 2+ on the " left side.   Pulmonary/Chest: Effort normal and breath sounds normal. No respiratory distress. She has no wheezes. She has no rhonchi. She has no rales. She exhibits no tenderness.   Abdominal: Soft. Bowel sounds are normal. She exhibits no distension, no abdominal bruit and no mass. There is no tenderness.   Musculoskeletal: She exhibits no edema or deformity.   Lymphadenopathy:     She has no cervical adenopathy.   Neurological: She is alert and oriented to person, place, and time. No cranial nerve deficit.   Skin: Skin is warm and dry. No rash noted. She is not diaphoretic. No cyanosis. No pallor. Nails show no clubbing.   Psychiatric: She has a normal mood and affect. Judgment normal.   Vitals reviewed.      Lab Review:       Assessment:      Diagnosis Plan   1. Ischemic cardiomyopathy     2. Chronic coronary artery disease     3. Essential hypertension     4. Hyperlipidemia, unspecified hyperlipidemia type     5. Non-rheumatic mitral regurgitation     6. Permanent atrial fibrillation     7. HAYDEN (obstructive sleep apnea)     8. Morbid obesity with BMI of 40.0-44.9, adult     9. H/O cardiac pacemaker       1. History of congestive heart failure, resolved. These episodes have been due to    hypertension. In hospital 8/2016 for CHF. No CHF now.    3. Hypertension. Blood pressure is well controlled at home..  4. Adrenal mass, 3.5 cm, followed by Dr. Felton.    5. Small abdominal aortic aneurysm, 3.6 cm by CTA 01/20/2014.    6. Chronic atrial fibrillation status post AV node ablation with pacemaker. Stay on Pradaxa 150 mg twice daily with food.  7. Diabetes mellitus type 2. Followed by Dr. Balbuena.  8. Morbid obesity. Is trying to lose weight.  9. Gastroesophageal reflux disease.    10. Chronic obstructive pulmonary disease.    11. History of breast cancer with lumpectomy on the right breast.    12. Obstructive sleep apnea on CPAP.    13. Anxiety.    14. Dizziness worse with anemia.  15. High social stressors.    16.  Colon cancer. Dr. Joaquim Bernal colectomy surgery 01/27/2014.    17. NSTEMI 11/17/13 with cardiomyopathy LVEF 35-40%, status post LAD and 2nd diagonal stents  at [facility] with Dr. Mayfield. Echocardiogram 05/2014 showed LVEF about 40%, apical hypokinesis.    LVEF 54% on echocardiogram 04/2015, 30% on 8/2016.   18. Anemia, sees Dr. Hart. Her upper and lower endoscopy with Dr. Bernal in 2015 were normal. This is under better control now. Repeat labs due to dyspnea  19. CKD, sees Chata Gerardo.      Plan:       See melisa in 3 months with an echo.  No med changes, check cmp and cbc today.     Atrial Fibrillation and Atrial Flutter  Assessment  • The patient has permanent atrial fibrillation  • This is non-valvular in etiology  • The patient's CHADS2-VASc score is 7  • A [id number] score of 2 or more is considered a high risk for a thromboembolic event  • Dabigatran prescribed    Plan  • Continue in atrial fibrillation with rate control  • Continue dabigatran for antithrombotic therapy, bleeding issues discussed  • Continue beta blocker for rate control      Coronary Artery Disease  Assessment  • The patient has no angina    Subjective - Objective  • There is a history of past MI  • There has been a previous stent procedure using SERGIO        Heart Failure  Assessment  • NYHA class III-A - There is limitation of physical activity. The patient is comfortable at rest, but ordinary activity causes fatigue, palpitations or shortness of breath.  • Beta blocker prescribed  • Diuretics prescribed  • Left ventricular function is moderately reduced by qualitative assessment    Plan  • The heart failure care plan was discussed with the patient today including: continuing the current program    Subjective/Objective    • Physical exam findings negative for rales, peripheral edema and elevated JVP.

## 2018-03-21 NOTE — TELEPHONE ENCOUNTER
----- Message from Rhiannon Rios MD sent at 3/21/2018  2:32 PM EDT -----  pls let her know that her labs are stable, no changes.

## 2018-03-21 NOTE — TELEPHONE ENCOUNTER
Called pt but the phone number is busy tried couple a times. Call her daughter Charissa to informed her the pt labs are stable and no changes per RM.....MT

## 2018-03-22 ENCOUNTER — TELEPHONE (OUTPATIENT)
Dept: CARDIOLOGY | Facility: CLINIC | Age: 78
End: 2018-03-22

## 2018-03-22 DIAGNOSIS — I25.10 CORONARY ARTERY DISEASE INVOLVING NATIVE CORONARY ARTERY OF NATIVE HEART WITHOUT ANGINA PECTORIS: Primary | ICD-10-CM

## 2018-03-22 RX ORDER — PEN NEEDLE, DIABETIC 32GX 5/32"
NEEDLE, DISPOSABLE MISCELLANEOUS
Qty: 100 EACH | Refills: 1 | Status: SHIPPED | OUTPATIENT
Start: 2018-03-22 | End: 2018-05-16 | Stop reason: SDUPTHER

## 2018-03-28 ENCOUNTER — TELEPHONE (OUTPATIENT)
Dept: ENDOCRINOLOGY | Age: 78
End: 2018-03-28

## 2018-03-28 DIAGNOSIS — Z79.4 TYPE 2 DIABETES MELLITUS WITH STAGE 3 CHRONIC KIDNEY DISEASE, WITH LONG-TERM CURRENT USE OF INSULIN (HCC): ICD-10-CM

## 2018-03-28 DIAGNOSIS — N18.30 TYPE 2 DIABETES MELLITUS WITH STAGE 3 CHRONIC KIDNEY DISEASE, WITH LONG-TERM CURRENT USE OF INSULIN (HCC): ICD-10-CM

## 2018-03-28 DIAGNOSIS — E11.22 TYPE 2 DIABETES MELLITUS WITH STAGE 3 CHRONIC KIDNEY DISEASE, WITH LONG-TERM CURRENT USE OF INSULIN (HCC): ICD-10-CM

## 2018-03-28 DIAGNOSIS — E78.5 HYPERLIPIDEMIA, UNSPECIFIED HYPERLIPIDEMIA TYPE: Primary | ICD-10-CM

## 2018-03-28 DIAGNOSIS — E55.9 VITAMIN D DEFICIENCY: ICD-10-CM

## 2018-03-28 NOTE — TELEPHONE ENCOUNTER
----- Message from Coraliz Styles sent at 3/28/2018 12:35 PM EDT -----  Contact: PATIENT     PATIENT IS ASKING FOR A LAB ORDER OT BE OUT IN FOR LAB APPT 06/25/2018. SHE IS BEING SEEN WITH [facility] AND JUST NEED A LAB ORDER OUT IN SO SHE CAN GET AB DRAWN ONCE.    PATIENT IS REQUESTING A CALL BACK   [phone]      Entered lab orders. Spoke to patient and mailed orders per patient request.

## 2018-03-30 RX ORDER — FUROSEMIDE 40 MG/1
TABLET ORAL
Qty: 225 TABLET | Refills: 2 | Status: SHIPPED | OUTPATIENT
Start: 2018-03-30 | End: 2019-01-01

## 2018-04-04 ENCOUNTER — CLINICAL SUPPORT NO REQUIREMENTS (OUTPATIENT)
Dept: CARDIOLOGY | Facility: CLINIC | Age: 78
End: 2018-04-04

## 2018-04-04 DIAGNOSIS — I48.91 ATRIAL FIBRILLATION, UNSPECIFIED TYPE (HCC): Primary | ICD-10-CM

## 2018-04-04 PROCEDURE — 93294 REM INTERROG EVL PM/LDLS PM: CPT | Performed by: INTERNAL MEDICINE

## 2018-04-04 PROCEDURE — 93296 REM INTERROG EVL PM/IDS: CPT | Performed by: INTERNAL MEDICINE

## 2018-04-25 RX ORDER — GLIMEPIRIDE 2 MG/1
2 TABLET ORAL EVERY MORNING
Qty: 90 TABLET | Refills: 1 | Status: SHIPPED | OUTPATIENT
Start: 2018-04-25 | End: 2018-01-01 | Stop reason: HOSPADM

## 2018-04-28 ENCOUNTER — RESULTS ENCOUNTER (OUTPATIENT)
Dept: ENDOCRINOLOGY | Age: 78
End: 2018-04-28

## 2018-04-28 DIAGNOSIS — E55.9 VITAMIN D DEFICIENCY: ICD-10-CM

## 2018-04-28 DIAGNOSIS — Z79.4 TYPE 2 DIABETES MELLITUS WITH STAGE 3 CHRONIC KIDNEY DISEASE, WITH LONG-TERM CURRENT USE OF INSULIN (HCC): ICD-10-CM

## 2018-04-28 DIAGNOSIS — E78.5 HYPERLIPIDEMIA, UNSPECIFIED HYPERLIPIDEMIA TYPE: ICD-10-CM

## 2018-04-28 DIAGNOSIS — E11.22 TYPE 2 DIABETES MELLITUS WITH STAGE 3 CHRONIC KIDNEY DISEASE, WITH LONG-TERM CURRENT USE OF INSULIN (HCC): ICD-10-CM

## 2018-04-28 DIAGNOSIS — N18.30 TYPE 2 DIABETES MELLITUS WITH STAGE 3 CHRONIC KIDNEY DISEASE, WITH LONG-TERM CURRENT USE OF INSULIN (HCC): ICD-10-CM

## 2018-04-30 ENCOUNTER — TELEPHONE (OUTPATIENT)
Dept: CARDIOLOGY | Facility: CLINIC | Age: 78
End: 2018-04-30

## 2018-04-30 NOTE — TELEPHONE ENCOUNTER
Pt called and states that her PCP order a chest x-ray and found a fluid around her heart. PCP advised her to take extra 40 mg of lasix in the AM. Pt wants to know if its ok for you?. PCP will faxed the result of xray to our office......Please advise     Informed pt that you are out today and will be back tomorrow.        Charissa THOMPSON

## 2018-05-01 RX ORDER — DABIGATRAN ETEXILATE 150 MG/1
150 CAPSULE ORAL EVERY 12 HOURS SCHEDULED
Qty: 56 CAPSULE | Refills: 0 | COMMUNITY
Start: 2018-05-01 | End: 2018-05-24 | Stop reason: SDUPTHER

## 2018-05-14 ENCOUNTER — TELEPHONE (OUTPATIENT)
Dept: CARDIOLOGY | Facility: CLINIC | Age: 78
End: 2018-05-14

## 2018-05-14 ENCOUNTER — LAB (OUTPATIENT)
Dept: LAB | Facility: HOSPITAL | Age: 78
End: 2018-05-14

## 2018-05-14 ENCOUNTER — HOSPITAL ENCOUNTER (OUTPATIENT)
Dept: ULTRASOUND IMAGING | Facility: HOSPITAL | Age: 78
Discharge: HOME | End: 2018-05-14
Attending: INTERNAL MEDICINE | Admitting: INTERNAL MEDICINE

## 2018-05-14 DIAGNOSIS — N28.1 RENAL CYST: ICD-10-CM

## 2018-05-14 DIAGNOSIS — I25.10 CORONARY ARTERY DISEASE INVOLVING NATIVE CORONARY ARTERY OF NATIVE HEART WITHOUT ANGINA PECTORIS: ICD-10-CM

## 2018-05-14 DIAGNOSIS — N18.30 CHRONIC KIDNEY DISEASE, STAGE III (MODERATE) (HCC): ICD-10-CM

## 2018-05-14 LAB
CHOLEST SERPL-MCNC: 129 MG/DL (ref 0–200)
HDLC SERPL-MCNC: 54 MG/DL (ref 40–60)
LDLC SERPL CALC-MCNC: 52 MG/DL (ref 0–100)
LDLC/HDLC SERPL: 0.97 {RATIO}
TRIGL SERPL-MCNC: 114 MG/DL (ref 0–150)
VLDLC SERPL-MCNC: 22.8 MG/DL (ref 5–40)

## 2018-05-14 PROCEDURE — 76775 US EXAM ABDO BACK WALL LIM: CPT

## 2018-05-14 PROCEDURE — 36415 COLL VENOUS BLD VENIPUNCTURE: CPT

## 2018-05-14 PROCEDURE — 80061 LIPID PANEL: CPT

## 2018-05-14 NOTE — TELEPHONE ENCOUNTER
Pt called to report that her ankles are swollen (bilat).  She does admit that she ate salty foods over the weekend for mothers day.   Pt denies any other symptoms.  She is currently taking Furosemide 40 mg (one tab in the a.m., one tab at noon, and one half tab hs)  Pt has been taken an extra tab this morning.  She states that it has helped some but ankles are still swollen.

## 2018-05-15 NOTE — TELEPHONE ENCOUNTER
Salt and heat outside are doing this.  Take an extra lasix today, tomorrow and Thursday and then back to usual dose - pls call. No salt advised.

## 2018-05-16 RX ORDER — PEN NEEDLE, DIABETIC 32GX 5/32"
NEEDLE, DISPOSABLE MISCELLANEOUS
Qty: 100 EACH | Refills: 0 | Status: SHIPPED | OUTPATIENT
Start: 2018-05-16 | End: 2018-01-01 | Stop reason: SDUPTHER

## 2018-05-24 RX ORDER — DABIGATRAN ETEXILATE 150 MG/1
150 CAPSULE ORAL EVERY 12 HOURS SCHEDULED
Qty: 56 CAPSULE | Refills: 0 | COMMUNITY
Start: 2018-05-24 | End: 2018-07-06 | Stop reason: SDUPTHER

## 2018-06-25 ENCOUNTER — LAB (OUTPATIENT)
Dept: LAB | Facility: HOSPITAL | Age: 78
End: 2018-06-25

## 2018-06-25 DIAGNOSIS — E11.22 TYPE 2 DIABETES MELLITUS WITH STAGE 3 CHRONIC KIDNEY DISEASE, WITH LONG-TERM CURRENT USE OF INSULIN (HCC): ICD-10-CM

## 2018-06-25 DIAGNOSIS — Z79.4 TYPE 2 DIABETES MELLITUS WITH STAGE 3 CHRONIC KIDNEY DISEASE, WITH LONG-TERM CURRENT USE OF INSULIN (HCC): ICD-10-CM

## 2018-06-25 DIAGNOSIS — N18.30 TYPE 2 DIABETES MELLITUS WITH STAGE 3 CHRONIC KIDNEY DISEASE, WITH LONG-TERM CURRENT USE OF INSULIN (HCC): ICD-10-CM

## 2018-06-25 DIAGNOSIS — E78.5 HYPERLIPIDEMIA, UNSPECIFIED HYPERLIPIDEMIA TYPE: ICD-10-CM

## 2018-06-25 DIAGNOSIS — D50.8 IRON DEFICIENCY ANEMIA REFRACTORY TO IRON THERAPY: ICD-10-CM

## 2018-06-25 DIAGNOSIS — E55.9 VITAMIN D DEFICIENCY: ICD-10-CM

## 2018-06-25 LAB
BASOPHILS # BLD AUTO: 0.03 10*3/MM3 (ref 0–0.1)
BASOPHILS NFR BLD AUTO: 0.3 % (ref 0–1.1)
DEPRECATED RDW RBC AUTO: 56.6 FL (ref 37–49)
EOSINOPHIL # BLD AUTO: 0.18 10*3/MM3 (ref 0–0.36)
EOSINOPHIL NFR BLD AUTO: 2 % (ref 1–5)
ERYTHROCYTE [DISTWIDTH] IN BLOOD BY AUTOMATED COUNT: 16.2 % (ref 11.7–14.5)
FERRITIN SERPL-MCNC: 68.7 NG/ML
HCT VFR BLD AUTO: 36.2 % (ref 34–45)
HGB BLD-MCNC: 11.4 G/DL (ref 11.5–14.9)
HGB RETIC QN: 34.1 PG (ref 29.8–36.1)
IMM GRANULOCYTES # BLD: 0.04 10*3/MM3 (ref 0–0.03)
IMM GRANULOCYTES NFR BLD: 0.4 % (ref 0–0.5)
IMM RETICS NFR: 18.8 % (ref 3–15.8)
IRON 24H UR-MRATE: 51 MCG/DL (ref 37–145)
IRON SATN MFR SERPL: 16 % (ref 14–48)
LYMPHOCYTES # BLD AUTO: 1.18 10*3/MM3 (ref 1–3.5)
LYMPHOCYTES NFR BLD AUTO: 12.8 % (ref 20–49)
MCH RBC QN AUTO: 30.2 PG (ref 27–33)
MCHC RBC AUTO-ENTMCNC: 31.5 G/DL (ref 32–35)
MCV RBC AUTO: 95.8 FL (ref 83–97)
MONOCYTES # BLD AUTO: 0.44 10*3/MM3 (ref 0.25–0.8)
MONOCYTES NFR BLD AUTO: 4.8 % (ref 4–12)
NEUTROPHILS # BLD AUTO: 7.34 10*3/MM3 (ref 1.5–7)
NEUTROPHILS NFR BLD AUTO: 79.7 % (ref 39–75)
NRBC BLD MANUAL-RTO: 0 /100 WBC (ref 0–0)
PLATELET # BLD AUTO: 161 10*3/MM3 (ref 150–375)
PMV BLD AUTO: 10.5 FL (ref 8.9–12.1)
RBC # BLD AUTO: 3.78 10*6/MM3 (ref 3.9–5)
RETICS/RBC NFR AUTO: 1.73 % (ref 0.6–2)
TIBC SERPL-MCNC: 325 MCG/DL (ref 249–505)
TRANSFERRIN SERPL-MCNC: 232 MG/DL (ref 200–360)
WBC NRBC COR # BLD: 9.21 10*3/MM3 (ref 4–10)

## 2018-06-25 PROCEDURE — 85025 COMPLETE CBC W/AUTO DIFF WBC: CPT | Performed by: INTERNAL MEDICINE

## 2018-06-25 PROCEDURE — 82728 ASSAY OF FERRITIN: CPT | Performed by: INTERNAL MEDICINE

## 2018-06-25 PROCEDURE — 36415 COLL VENOUS BLD VENIPUNCTURE: CPT | Performed by: INTERNAL MEDICINE

## 2018-06-25 PROCEDURE — 83540 ASSAY OF IRON: CPT | Performed by: INTERNAL MEDICINE

## 2018-06-25 PROCEDURE — 84466 ASSAY OF TRANSFERRIN: CPT | Performed by: INTERNAL MEDICINE

## 2018-06-25 PROCEDURE — 85046 RETICYTE/HGB CONCENTRATE: CPT | Performed by: INTERNAL MEDICINE

## 2018-06-26 LAB
25(OH)D3 SERPL-MCNC: 33 NG/ML (ref 30–100)
ALBUMIN SERPL-MCNC: 3.9 G/DL (ref 3.5–4.8)
ALBUMIN/GLOB SERPL: 1.7 {RATIO} (ref 1.2–2.2)
ALP SERPL-CCNC: 98 IU/L (ref 39–117)
ALT SERPL-CCNC: 9 IU/L (ref 0–32)
AST SERPL-CCNC: 15 IU/L (ref 0–40)
BILIRUB SERPL-MCNC: 0.3 MG/DL (ref 0–1.2)
BUN SERPL-MCNC: 38 MG/DL (ref 8–27)
BUN/CREAT SERPL: 29 (ref 12–28)
C PEPTIDE SERPL-MCNC: 3 NG/ML (ref 1.1–4.4)
CALCIUM SERPL-MCNC: 8.8 MG/DL (ref 8.7–10.3)
CHLORIDE SERPL-SCNC: 95 MMOL/L (ref 96–106)
CHOLEST SERPL-MCNC: 137 MG/DL (ref 100–199)
CO2 SERPL-SCNC: 27 MMOL/L (ref 20–29)
CREAT SERPL-MCNC: 1.29 MG/DL (ref 0.57–1)
GLOBULIN SER CALC-MCNC: 2.3 G/DL (ref 1.5–4.5)
GLUCOSE SERPL-MCNC: 217 MG/DL (ref 65–99)
HBA1C MFR BLD: 9.4 % (ref 4.8–5.6)
HDLC SERPL-MCNC: 51 MG/DL
LDLC SERPL CALC-MCNC: 52 MG/DL (ref 0–99)
MICROALBUMIN UR-MCNC: 49.7 UG/ML
POTASSIUM SERPL-SCNC: 4.2 MMOL/L (ref 3.5–5.2)
PROT SERPL-MCNC: 6.2 G/DL (ref 6–8.5)
SODIUM SERPL-SCNC: 141 MMOL/L (ref 134–144)
T4 SERPL-MCNC: 8.4 UG/DL (ref 4.5–12)
TRIGL SERPL-MCNC: 170 MG/DL (ref 0–149)
TSH SERPL-ACNC: 2.89 UIU/ML (ref 0.45–4.5)
URATE SERPL-MCNC: 4.5 MG/DL (ref 2.5–7.1)
VLDLC SERPL-MCNC: 34 MG/DL (ref 5–40)

## 2018-07-02 ENCOUNTER — APPOINTMENT (OUTPATIENT)
Dept: LAB | Facility: HOSPITAL | Age: 78
End: 2018-07-02

## 2018-07-02 ENCOUNTER — OFFICE VISIT (OUTPATIENT)
Dept: ONCOLOGY | Facility: CLINIC | Age: 78
End: 2018-07-02

## 2018-07-02 VITALS
DIASTOLIC BLOOD PRESSURE: 76 MMHG | WEIGHT: 255.4 LBS | HEIGHT: 64 IN | HEART RATE: 70 BPM | OXYGEN SATURATION: 93 % | TEMPERATURE: 98.1 F | SYSTOLIC BLOOD PRESSURE: 134 MMHG | BODY MASS INDEX: 43.6 KG/M2 | RESPIRATION RATE: 14 BRPM

## 2018-07-02 DIAGNOSIS — D50.8 IRON DEFICIENCY ANEMIA REFRACTORY TO IRON THERAPY: Primary | ICD-10-CM

## 2018-07-02 PROCEDURE — 99214 OFFICE O/P EST MOD 30 MIN: CPT | Performed by: INTERNAL MEDICINE

## 2018-07-02 PROCEDURE — G0463 HOSPITAL OUTPT CLINIC VISIT: HCPCS | Performed by: INTERNAL MEDICINE

## 2018-07-02 NOTE — PROGRESS NOTES
Subjective .     REASONS FOR FOLLOWUP:  Anemia, cancer    HISTORY OF PRESENT ILLNESS:  The patient is a 78 y.o. year old female  who is here for follow-up with the above-mentioned history.    Denies chest pain, any change in baseline lightheadedness.  Denies bleeding.  Denies any change in baseline shortness of air.    Past Medical History:   Diagnosis Date   • Abdominal aortic aneurysm    • Acute bronchitis    • Acute combined systolic and diastolic congestive heart failure    • Anemia     Secondary to stage 3 chronic kidney disease.   • Aneurysm of abdominal aorta    • Anxiety    • Aortic aneurysm    • Arteriosclerotic coronary artery disease    • Atrial fibrillation    • Back pain    • Breast cancer     Stage I, status post lumpectomy   • CAD (coronary artery disease)    • Cardiomyopathy    • CHF (congestive heart failure)    • Chronic kidney disease    • Colon cancer     Stage I, resected 01/27/2014   • Common cold    • Congestive heart failure    • COPD (chronic obstructive pulmonary disease)    • Coronary artery disease    • Cough    • Diabetes mellitus     type 2   • Dizziness    • Dyspnea    • Esophageal reflux    • Heart attack    • Hyperlipidemia    • Hypertension    • Iron deficiency anemia secondary to inadequate dietary iron intake    • Joint pain     IN THE RIGHT KNEE   • Mass of adrenal gland     lesion solitary, CT thereof   • Morbid obesity    • Neoplasm of adrenal gland    • Obstructive sleep apnea    • Oral thrush    • HAYDEN on CPAP    • Osteoarthritis    • Pneumonia     onset date: 01 Mar 2011, Severe pneumonia w mechanical ventilation, treated at [facility]. Patricia Britton   • Postoperative visit    • Radiation    • Sick sinus syndrome    • Strong family history of breast cancer    • Type 2 diabetes mellitus    • Ventral hernia    • Vitamin D deficiency        HEMATOLOGIC/ONCOLOGIC HISTORY:  (History from previous dates can be found in the separate document.)    MEDICATIONS    Current Outpatient  Prescriptions:   •  ADVAIR DISKUS 250-50 MCG/DOSE DISKUS, Inhale 1 puff 2 (two) times a day., Disp: , Rfl:   •  allopurinol (ZYLOPRIM) 300 MG tablet, Take 300 mg by mouth Daily., Disp: , Rfl:   •  atorvastatin (LIPITOR) 20 MG tablet, Take 1 tablet by mouth Daily., Disp: 90 tablet, Rfl: 0  •  carvedilol (COREG) 25 MG tablet, TAKE 1/2 TABLET BY MOUTH TWICE DAILY, TAKE MORNING AND AT NOON. extra ONE-HALF TABLET AS NEEDED (Patient taking differently: TAKE 1 TABLET BY MOUTH TWICE DAILY, TAKE MORNING AND AT NOON. extra ONE-HALF TABLET AS NEEDED), Disp: 180 tablet, Rfl: 1  •  dabigatran etexilate (PRADAXA) 150 MG capsu, Take 1 capsule by mouth Every 12 (Twelve) Hours., Disp: 56 capsule, Rfl: 0  •  diphenhydrAMINE (BENADRYL) 25 MG tablet, Take 25 mg by mouth Every 6 (Six) Hours As Needed for Itching., Disp: , Rfl:   •  famotidine (PEPCID) 20 MG tablet, Take 1 tablet by mouth 2 (Two) Times a Day., Disp: , Rfl: 1  •  fluticasone (FLONASE) 50 MCG/ACT nasal spray, 2 sprays into each nostril Daily., Disp: , Rfl:   •  furosemide (LASIX) 40 MG tablet, TAKE 1 TABLET BY MOUTH AM AND 1 TABLET MID DAY AND 1/2 TABLET PM, Disp: 225 tablet, Rfl: 2  •  glimepiride (AMARYL) 2 MG tablet, Take 1 tablet by mouth Every Morning., Disp: 90 tablet, Rfl: 1  •  GLOBAL EASE INJECT PEN NEEDLES 32G X 4 MM misc, USE AS DIRECTED, Disp: 100 each, Rfl: 0  •  Insulin Glargine 300 UNIT/ML solution pen-injector, Inject 40 Units under the skin Daily., Disp: 9 pen, Rfl: 2  •  ipratropium-albuterol (DUO-NEB) 0.5-2.5 mg/mL nebulizer, INHALE THE contents OF ONE vial using nebulizer FOUR TIMES DAILY AS NEEDED, Disp: , Rfl: 5  •  levocetirizine (XYZAL) 5 MG tablet, Take 5 mg by mouth Every Evening., Disp: , Rfl:   •  Melatonin 10 MG capsule, Take 1 capsule by mouth every night., Disp: , Rfl:   •  nitroglycerin (NITROSTAT) 0.4 MG SL tablet, Place 1 tablet under the tongue Every 5 (Five) Minutes As Needed for chest pain., Disp: 25 tablet, Rfl: 0  •  NON FORMULARY,  MELQUIADES INSULIN PEN, Disp: , Rfl:     ALLERGIES:     Allergies   Allergen Reactions   • Codeine Itching     Edema   • Hydralazine Hcl      BP drops.   • Lisinopril Cough     Edema       SOCIAL HISTORY:       Social History     Social History   • Marital status:      Spouse name: N/A   • Number of children: N/A   • Years of education: N/A     Occupational History   • Retired      Social History Main Topics   • Smoking status: Former Smoker     Packs/day: 1.00     Years: 25.00     Quit date: 2004   • Smokeless tobacco: Never Used      Comment: Daily caffeine use   • Alcohol use Yes      Comment: seldom   • Drug use: No   • Sexual activity: Defer     Other Topics Concern   • Not on file     Social History Narrative   • No narrative on file         FAMILY HISTORY:  Family History   Problem Relation Age of Onset   • Breast cancer Mother 60   • Breast cancer Sister 50   • Arrhythmia Sister    • Hypertension Sister    • Brain cancer Father 38   • Breast cancer Sister 40   • Heart attack Brother    • Hypertension Brother    • Diabetes Son    • Hypertension Son    • Heart disease Maternal Grandmother        REVIEW OF SYSTEMS:  Review of Systems   Constitutional: Negative for activity change.   HENT: Negative for nosebleeds and trouble swallowing.    Respiratory: Positive for shortness of breath. Negative for wheezing.    Cardiovascular: Negative for chest pain and palpitations.   Gastrointestinal: Negative for constipation, diarrhea and nausea.   Genitourinary: Negative for dysuria and hematuria.   Musculoskeletal: Negative for arthralgias and myalgias.   Skin: Negative for rash and wound.   Neurological: Positive for light-headedness. Negative for seizures and syncope.   Hematological: Negative for adenopathy. Does not bruise/bleed easily.   Psychiatric/Behavioral: Negative for confusion.        Objective    Vitals:    07/02/18 1025   BP: 134/76   Pulse: 70   Resp: 14   Temp: 98.1 °F (36.7 °C)   TempSrc: Oral   SpO2:  "93%   Weight: 116 kg (255 lb 6.4 oz)   Height: 163 cm (64.17\")  Comment: new height   PainSc: 0-No pain     Current Status 7/2/2018   ECOG score 0      PHYSICAL EXAM:    CONSTITUTIONAL:  Vital signs reviewed.  No distress, looks comfortable.  EYES:  Conjunctiva and lids unremarkable.  PERRLA  EARS,NOSE,MOUTH,THROAT:  Ears and nose appear unremarkable.  Lips, teeth, gums appear unremarkable.  RESPIRATORY:  Normal respiratory effort.  Lungs clear to auscultation bilaterally.  CARDIOVASCULAR:  Normal S1, S2.  No murmurs rubs or gallops.  No significant lower extremity edema.  BREAST: Patient declines breast exam today.  GASTROINTESTINAL: Abdomen appears unremarkable.  Nontender.  No hepatomegaly.  No splenomegaly.  LYMPHATIC:  No cervical, supraclavicular, axillary lymphadenopathy.  SKIN:  Warm.  No rashes.  PSYCHIATRIC:  Normal judgment and insight.  Normal mood and affect.     RECENT LABS:        WBC   Date/Time Value Ref Range Status   06/25/2018 10:36 AM 9.21 4.00 - 10.00 10*3/mm3 Final     Hemoglobin   Date/Time Value Ref Range Status   06/25/2018 10:36 AM 11.4 (L) 11.5 - 14.9 g/dL Final     Platelets   Date/Time Value Ref Range Status   06/25/2018 10:36  150 - 375 10*3/mm3 Final       Assessment/Plan   Iron deficiency anemia refractory to iron therapy  - Ferritin  - Iron Profile  - Retic With IRF & RET-He  - CBC & Differential      ASSESSMENT:  1.  Iron deficiency anemia. Intolerant and does not respond to oral iron, has used Feraheme. She follows with Dr. Gautam   Recent iron studies Normal.    2. Anemia secondary to stage 3 chronic kidney disease. Procrit started 11/03/2015.   Has not needed Procrit recently.  Continues to none Procrit.    3. Stage I breast cancer. She completed 5 years of tamoxifen in 2005.   Last mammogram 9/21/17, BIRADS 2.   She declined a breast exam today.  She states I typically do breast exams.    4. Stage I colon adenocarcinom a, resected 01/27/2014. No adjuvant chemotherapy " indicated. Follows with Dr. Gautam regularly.     5. Difficult IV stick. She states she has lots of issues with this. I told her at some point she may need a port to have access for IV infusions and blood trans fusions. We discussed some of the risks with this. For now, we will hold off, but if the IV sticks become more difficult, we may need to reconsider this.     6.  Intermittent yeast infection at the folds of her skin under her breasts bilaterally, intermittently. She thinks nystatin cream works better than nystatin powder. She uses Desitin as well.     7. Chronic kidney disease. Creatinine 0.8 and 1.2 on lab checks from 2013. Creatinine was noted to be 1.8 on 10/08/2015 and 11/03/2015. She is now seeing Dr. Isha Kraus of Nephrology for further management of this as well as her hyperkalemia and blood pressure issues.     PLAN:   · M.D. 6 months.  Iron labs 1 week before.  · (Previously had every 2 month labs.  No plans for Procrit as she has not needed this for several months.  Was previously receiving Procrit if Hb <10).  · Defer further BMP assessments to her other providers.   · Last mammogram 9/21/17, BI-RADS 2.  · Patient declines breast exam today, but I suspect will agree to this next visit.    Daughter assisted with history.

## 2018-07-06 RX ORDER — DABIGATRAN ETEXILATE 150 MG/1
150 CAPSULE ORAL EVERY 12 HOURS SCHEDULED
Qty: 56 CAPSULE | Refills: 0 | COMMUNITY
Start: 2018-07-06 | End: 2018-01-01 | Stop reason: SDUPTHER

## 2018-07-10 ENCOUNTER — OFFICE VISIT (OUTPATIENT)
Dept: ENDOCRINOLOGY | Age: 78
End: 2018-07-10

## 2018-07-10 VITALS
RESPIRATION RATE: 16 BRPM | WEIGHT: 255 LBS | SYSTOLIC BLOOD PRESSURE: 132 MMHG | BODY MASS INDEX: 43.53 KG/M2 | DIASTOLIC BLOOD PRESSURE: 72 MMHG

## 2018-07-10 DIAGNOSIS — E11.22 TYPE 2 DIABETES MELLITUS WITH STAGE 3 CHRONIC KIDNEY DISEASE, WITH LONG-TERM CURRENT USE OF INSULIN (HCC): Primary | ICD-10-CM

## 2018-07-10 DIAGNOSIS — IMO0002 UNCONTROLLED TYPE 2 DIABETES MELLITUS WITH COMPLICATION, WITH LONG-TERM CURRENT USE OF INSULIN: ICD-10-CM

## 2018-07-10 DIAGNOSIS — E66.01 MORBID OBESITY WITH BMI OF 40.0-44.9, ADULT (HCC): ICD-10-CM

## 2018-07-10 DIAGNOSIS — N18.30 TYPE 2 DIABETES MELLITUS WITH STAGE 3 CHRONIC KIDNEY DISEASE, WITH LONG-TERM CURRENT USE OF INSULIN (HCC): Primary | ICD-10-CM

## 2018-07-10 DIAGNOSIS — I10 ESSENTIAL HYPERTENSION: ICD-10-CM

## 2018-07-10 DIAGNOSIS — E78.2 MIXED HYPERLIPIDEMIA: ICD-10-CM

## 2018-07-10 DIAGNOSIS — E55.9 VITAMIN D DEFICIENCY: ICD-10-CM

## 2018-07-10 DIAGNOSIS — Z79.4 TYPE 2 DIABETES MELLITUS WITH STAGE 3 CHRONIC KIDNEY DISEASE, WITH LONG-TERM CURRENT USE OF INSULIN (HCC): Primary | ICD-10-CM

## 2018-07-10 PROCEDURE — 99214 OFFICE O/P EST MOD 30 MIN: CPT | Performed by: INTERNAL MEDICINE

## 2018-07-10 RX ORDER — ALLOPURINOL 300 MG/1
300 TABLET ORAL DAILY
Qty: 90 TABLET | Refills: 3 | Status: SHIPPED | OUTPATIENT
Start: 2018-07-10

## 2018-07-10 RX ORDER — LINAGLIPTIN 5 MG/1
5 TABLET, FILM COATED ORAL DAILY
Qty: 30 TABLET | Refills: 5 | Status: SHIPPED | OUTPATIENT
Start: 2018-07-10 | End: 2018-07-11 | Stop reason: SDUPTHER

## 2018-07-10 NOTE — PROGRESS NOTES
Subjective   Renuka Patel is a 78 y.o. female seen for follow up for DM2, hyperlipidemia, HTN, vit d deficiency, lab review. Patient is checking BG 5 times a day. She states that she was sick and had steroids and had to increase Toujeo to 45 units. She denies any other problems or concerns.     History of Present Illness this is a 78-year-old female known patient with type II diabetes hypertension and dyslipidemia as well as morbid obesity and vitamin D deficiency.  Over the past 6 months she had had 3-4 episodes of upper respiratory infection and allergies for which she had to use a steroids and those caused her to have significant and degree of hyperglycemia.    /72   Resp 16   Wt 116 kg (255 lb)   BMI 43.53 kg/m²      Allergies   Allergen Reactions   • Codeine Itching     Edema   • Hydralazine Hcl      BP drops.   • Lisinopril Cough     Edema       Current Outpatient Prescriptions:   •  ADVAIR DISKUS 250-50 MCG/DOSE DISKUS, Inhale 1 puff 2 (two) times a day., Disp: , Rfl:   •  allopurinol (ZYLOPRIM) 300 MG tablet, Take 300 mg by mouth Daily., Disp: , Rfl:   •  atorvastatin (LIPITOR) 20 MG tablet, Take 1 tablet by mouth Daily., Disp: 90 tablet, Rfl: 0  •  carvedilol (COREG) 25 MG tablet, TAKE 1/2 TABLET BY MOUTH TWICE DAILY, TAKE MORNING AND AT NOON. extra ONE-HALF TABLET AS NEEDED (Patient taking differently: TAKE 1 TABLET BY MOUTH TWICE DAILY, TAKE MORNING AND AT NOON. extra ONE-HALF TABLET AS NEEDED), Disp: 180 tablet, Rfl: 1  •  dabigatran etexilate (PRADAXA) 150 MG capsu, Take 1 capsule by mouth Every 12 (Twelve) Hours., Disp: 56 capsule, Rfl: 0  •  diphenhydrAMINE (BENADRYL) 25 MG tablet, Take 25 mg by mouth Every 6 (Six) Hours As Needed for Itching., Disp: , Rfl:   •  famotidine (PEPCID) 20 MG tablet, Take 1 tablet by mouth 2 (Two) Times a Day., Disp: , Rfl: 1  •  fluticasone (FLONASE) 50 MCG/ACT nasal spray, 2 sprays into each nostril Daily., Disp: , Rfl:   •  furosemide (LASIX) 40 MG tablet,  TAKE 1 TABLET BY MOUTH AM AND 1 TABLET MID DAY AND 1/2 TABLET PM, Disp: 225 tablet, Rfl: 2  •  glimepiride (AMARYL) 2 MG tablet, Take 1 tablet by mouth Every Morning., Disp: 90 tablet, Rfl: 1  •  GLOBAL EASE INJECT PEN NEEDLES 32G X 4 MM misc, USE AS DIRECTED, Disp: 100 each, Rfl: 0  •  Insulin Glargine 300 UNIT/ML solution pen-injector, Inject 40 Units under the skin Daily. (Patient taking differently: Inject 45 Units under the skin Daily.), Disp: 9 pen, Rfl: 2  •  ipratropium-albuterol (DUO-NEB) 0.5-2.5 mg/mL nebulizer, INHALE THE contents OF ONE vial using nebulizer FOUR TIMES DAILY AS NEEDED, Disp: , Rfl: 5  •  levocetirizine (XYZAL) 5 MG tablet, Take 5 mg by mouth Every Evening., Disp: , Rfl:   •  Melatonin 10 MG capsule, Take 1 capsule by mouth every night., Disp: , Rfl:   •  nitroglycerin (NITROSTAT) 0.4 MG SL tablet, Place 1 tablet under the tongue Every 5 (Five) Minutes As Needed for chest pain., Disp: 25 tablet, Rfl: 0    The following portions of the patient's history were reviewed and updated as appropriate: allergies, current medications, past family history, past medical history, past social history, past surgical history and problem list.    Review of Systems   Constitutional: Negative.    HENT: Negative.    Eyes: Negative.    Respiratory: Negative.    Cardiovascular: Negative.    Gastrointestinal: Negative.    Endocrine: Negative.    Genitourinary: Negative.    Musculoskeletal: Negative.    Skin: Negative.    Allergic/Immunologic: Negative.    Neurological: Negative.    Hematological: Negative.    Psychiatric/Behavioral: Negative.        Objective   Physical Exam   Constitutional: She is oriented to person, place, and time. She appears well-developed and well-nourished. No distress.   HENT:   Head: Normocephalic and atraumatic.   Right Ear: External ear normal.   Left Ear: External ear normal.   Nose: Nose normal.   Mouth/Throat: Oropharynx is clear and moist. No oropharyngeal exudate.   Eyes:  Conjunctivae and EOM are normal. Pupils are equal, round, and reactive to light. Right eye exhibits no discharge. Left eye exhibits no discharge. No scleral icterus.   Neck: Normal range of motion. Neck supple. No JVD present. No tracheal deviation present. No thyromegaly present.   Cardiovascular: Normal rate, regular rhythm, normal heart sounds and intact distal pulses.  Exam reveals no gallop and no friction rub.    No murmur heard.  Pulmonary/Chest: Effort normal and breath sounds normal. No stridor. No respiratory distress. She has no wheezes. She has no rales. She exhibits no tenderness.   Abdominal: Soft. Bowel sounds are normal. She exhibits no distension and no mass. There is no tenderness. There is no rebound and no guarding. No hernia.   Musculoskeletal: Normal range of motion. She exhibits no edema, tenderness or deformity.   Lymphadenopathy:     She has no cervical adenopathy.   Neurological: She is alert and oriented to person, place, and time. She has normal reflexes. She displays normal reflexes. No cranial nerve deficit or sensory deficit. She exhibits normal muscle tone. Coordination normal.   Skin: Skin is warm and dry. No rash noted. She is not diaphoretic. No erythema. No pallor.   Psychiatric: She has a normal mood and affect. Her behavior is normal. Judgment and thought content normal.   Nursing note and vitals reviewed.       Lab Results   Component Value Date    GLUCOSE 208 (H) 03/21/2018    BUN 38 (H) 06/25/2018    CREATININE 1.29 (H) 06/25/2018    EGFRIFNONA 39 (L) 06/25/2018    EGFRIFAFRI 46 (L) 06/25/2018    BCR 29 (H) 06/25/2018    K 4.2 06/25/2018    CO2 27 06/25/2018    CALCIUM 8.8 06/25/2018    PROTENTOTREF 6.2 06/25/2018    ALBUMIN 3.9 06/25/2018    LABIL2 1.7 06/25/2018    AST 15 06/25/2018    ALT 9 06/25/2018     Lab Results   Component Value Date    HGBA1C 9.4 (H) 06/25/2018     Lab Results   Component Value Date    TSH 2.890 06/25/2018     Lab Results   Component Value Date     CHOL 129 05/14/2018    CHOL 97 08/25/2016    CHLPL 137 06/25/2018    CHLPL 136 03/28/2017    CHLPL 120 11/29/2016     Lab Results   Component Value Date    TRIG 170 (H) 06/25/2018    TRIG 114 05/14/2018    TRIG 165 (H) 03/28/2017     Lab Results   Component Value Date    HDL 51 06/25/2018    HDL 54 05/14/2018    HDL 49 03/28/2017     Lab Results   Component Value Date    LDL 52 06/25/2018    LDL 52 05/14/2018    LDL 54 03/28/2017           Assessment/Plan   Diagnoses and all orders for this visit:    Type 2 diabetes mellitus with stage 3 chronic kidney disease, with long-term current use of insulin (CMS/[facility])  -     Cancel: T4 & TSH ([facility])  -     Cancel: Uric Acid  -     Cancel: Vitamin D 25 Hydroxy  -     Cancel: Comprehensive Metabolic Panel  -     Cancel: C-Peptide  -     Cancel: Hemoglobin A1c  -     Cancel: Lipid Panel  -     Cancel: MicroAlbumin, Urine, Random - Urine, Clean Catch  -     T4 & TSH ([facility]); Future  -     Uric Acid; Future  -     Vitamin D 25 Hydroxy; Future  -     Comprehensive Metabolic Panel; Future  -     C-Peptide; Future  -     Hemoglobin A1c; Future  -     Lipid Panel; Future  -     MicroAlbumin, Urine, Random - Urine, Clean Catch; Future    Vitamin D deficiency  -     Cancel: T4 & TSH ([facility])  -     Cancel: Uric Acid  -     Cancel: Vitamin D 25 Hydroxy  -     Cancel: Comprehensive Metabolic Panel  -     Cancel: C-Peptide  -     Cancel: Hemoglobin A1c  -     Cancel: Lipid Panel  -     Cancel: MicroAlbumin, Urine, Random - Urine, Clean Catch  -     T4 & TSH ([facility]); Future  -     Uric Acid; Future  -     Vitamin D 25 Hydroxy; Future  -     Comprehensive Metabolic Panel; Future  -     C-Peptide; Future  -     Hemoglobin A1c; Future  -     Lipid Panel; Future  -     MicroAlbumin, Urine, Random - Urine, Clean Catch; Future    Morbid obesity with BMI of 40.0-44.9, adult (CMS/[facility])  -     Cancel: T4 & TSH ([facility])  -     Cancel: Uric Acid  -     Cancel: Vitamin D 25 Hydroxy  -      Cancel: Comprehensive Metabolic Panel  -     Cancel: C-Peptide  -     Cancel: Hemoglobin A1c  -     Cancel: Lipid Panel  -     Cancel: MicroAlbumin, Urine, Random - Urine, Clean Catch  -     T4 & TSH ([facility]); Future  -     Uric Acid; Future  -     Vitamin D 25 Hydroxy; Future  -     Comprehensive Metabolic Panel; Future  -     C-Peptide; Future  -     Hemoglobin A1c; Future  -     Lipid Panel; Future  -     MicroAlbumin, Urine, Random - Urine, Clean Catch; Future    Uncontrolled type 2 diabetes mellitus with complication, with long-term current use of insulin (CMS/HCC)  -     Cancel: T4 & TSH ([facility])  -     Cancel: Uric Acid  -     Cancel: Vitamin D 25 Hydroxy  -     Cancel: Comprehensive Metabolic Panel  -     Cancel: C-Peptide  -     Cancel: Hemoglobin A1c  -     Cancel: Lipid Panel  -     Cancel: MicroAlbumin, Urine, Random - Urine, Clean Catch  -     T4 & TSH ([facility]); Future  -     Uric Acid; Future  -     Vitamin D 25 Hydroxy; Future  -     Comprehensive Metabolic Panel; Future  -     C-Peptide; Future  -     Hemoglobin A1c; Future  -     Lipid Panel; Future  -     MicroAlbumin, Urine, Random - Urine, Clean Catch; Future    Mixed hyperlipidemia  -     Cancel: T4 & TSH ([facility])  -     Cancel: Uric Acid  -     Cancel: Vitamin D 25 Hydroxy  -     Cancel: Comprehensive Metabolic Panel  -     Cancel: C-Peptide  -     Cancel: Hemoglobin A1c  -     Cancel: Lipid Panel  -     Cancel: MicroAlbumin, Urine, Random - Urine, Clean Catch  -     T4 & TSH ([facility]); Future  -     Uric Acid; Future  -     Vitamin D 25 Hydroxy; Future  -     Comprehensive Metabolic Panel; Future  -     C-Peptide; Future  -     Hemoglobin A1c; Future  -     Lipid Panel; Future  -     MicroAlbumin, Urine, Random - Urine, Clean Catch; Future    Essential hypertension  -     Cancel: T4 & TSH ([facility])  -     Cancel: Uric Acid  -     Cancel: Vitamin D 25 Hydroxy  -     Cancel: Comprehensive Metabolic Panel  -     Cancel: C-Peptide  -      Cancel: Hemoglobin A1c  -     Cancel: Lipid Panel  -     Cancel: MicroAlbumin, Urine, Random - Urine, Clean Catch  -     T4 & TSH ([facility]); Future  -     Uric Acid; Future  -     Vitamin D 25 Hydroxy; Future  -     Comprehensive Metabolic Panel; Future  -     C-Peptide; Future  -     Hemoglobin A1c; Future  -     Lipid Panel; Future  -     MicroAlbumin, Urine, Random - Urine, Clean Catch; Future    Other orders  -     TRADJENTA 5 MG tablet tablet; Take 1 tablet by mouth Daily.  -     allopurinol (ZYLOPRIM) 300 MG tablet; Take 1 tablet by mouth Daily.  -     Insulin Glargine 300 UNIT/ML solution pen-injector; Inject 50 Units under the skin Daily.               In summary I saw and examined this 78-year-old female for above-mentioned problems.  I reviewed her laboratory evaluation of 06/25/2018 and provided her with a hard copy of it.  Her hemoglobin A1c is 9.4 and the rest of her laboratory evaluation is within an acceptable range.  We will increase the dose of glargine to 50 units every morning and ask her to increase the dose by 2 units every 3 days if her fasting blood sugar is greater than 120.  I also gave her samples of and a prescription for Tradjenta 5 mg daily.  She will see MsShannon Mary Ann Suleman in 6 months or sooner if needed with laboratory evaluation prior to each office visit.

## 2018-07-11 RX ORDER — LINAGLIPTIN 5 MG/1
5 TABLET, FILM COATED ORAL DAILY
Qty: 90 TABLET | Refills: 3 | Status: SHIPPED | OUTPATIENT
Start: 2018-07-11 | End: 2019-01-01 | Stop reason: SDUPTHER

## 2018-07-23 PROBLEM — N18.30 STAGE 3 CHRONIC KIDNEY DISEASE (HCC): Status: ACTIVE | Noted: 2018-01-01

## 2018-07-25 NOTE — TELEPHONE ENCOUNTER
I recently saw patient in the office.  You had previously ordered this echocardiogram.  I will be glad to call her.  Please review and advise.  When do you want to see her again?    · Left ventricular systolic function is moderately decreased. Calculated EF = 30%. Estimated EF was in agreement with the calculated EF. Global left ventricular wall motion appears abnormal.  · The following segments are hypokinetic: mid anteroseptal, mid inferoseptal, apical septal, apical inferior and apex.  · The left ventricular cavity is mildly dilated.  · Left ventricular wall thickness is consistent with mild concentric hypertrophy.  · Left ventricular diastolic dysfunction is noted (grade III w/high LAP) consistent with fixed restricive pattern.  · Left atrial volume is moderately increased.  · There is mild calcification of the aortic valve.  · The mitral valve is abnormal in structure. Mild MAC is present.  · Mild mitral valve regurgitation is present.  · Moderate tricuspid valve regurgitation is present. Estimated right ventricular systolic pressure from tricuspid regurgitation is mildly elevated (35-45 mmHg).  · There is a small (<1cm) pericardial effusion adjacent to the left ventricle. There is no evidence of cardiac tamponade.

## 2018-07-25 NOTE — TELEPHONE ENCOUNTER
----- Message from REGAN Dean sent at 7/23/2018 10:35 AM EDT -----    Follow-up on echocardiogram results  Make follow-up appointment

## 2018-08-07 NOTE — TELEPHONE ENCOUNTER
[phone]    Pt called stating she is mailing in her paperwork for patient assistance on her pradaxa.  She would like you to finish filling it out and fax for her please.  FYI.    [facility] RMA

## 2018-08-09 NOTE — TELEPHONE ENCOUNTER
Called and informed pt that I received the paperwork and I will faxed the paperwork at patient assistance.......MT

## 2018-08-10 NOTE — TELEPHONE ENCOUNTER
Spoke with patient and let her know that I have set aside samples for her.     ----- Message from Cora Styles sent at 8/10/2018 10:39 AM EDT -----  Contact: PATIENT   PATIENT IS CALLING FOR SAMPLES    MEDICAITON:  Insulin Glargine 300 UNIT/ML solution pen-injector   MESSAGE:  PATIENT HAS CALLED IN REGARDS TO GETTING SAMPLES OF THE ABOVE MEDICATION . PATIENT WAS TRYING TO GET PATIENT ASSISTANT PROGRAM, HOWEVER, THEY CAN NOT ASSISTANT IN GETTING THIS MEDICATION COVERED.      PATIENT IS REQUESTING A CALL BACK IF WE HAVE THESE SAMPLES OR NOT. PATIENT IS STATING SHE CAN BY NEXT WENSDAY TO PICK THIS UP.    CALL BACK:[phone]

## 2018-08-23 NOTE — TELEPHONE ENCOUNTER
----- Message from Tony Quiroga sent at 8/22/2018  3:44 PM EDT -----  Contact: LESLIE NELSON IS WANTING KNOW IF IT IS OK TO RELEASE TRADJENTA 5 MG tablet tablet TO PATIENT, THEY RECEIVED SCRIPT IN MAIL WITHOUT SIGNATURE, IT SAYS ELECTRONICALLY SIGNED BUT THERE ISNT A SIGNATURE ON THE FORM.      PLEASE ADVISE IF THEY ARE ALLOWED TO RELEASE TO PATIENT    PHONE # [phone]  [phone]        Spoke to  patient assistance and gave permission to fill RX.

## 2018-10-22 NOTE — PROGRESS NOTES
Date of Office Visit: 10/22/2018  Encounter Provider: Rhiannon Rios MD  Place of Service: [facility] CARDIOLOGY  Patient Name: Renuka Patel  :1940      Patient ID:  Renuka Patel is a 78 y.o. female is here for  followup for CAD, CHF, atrial fibrillation.         History of Present Illness    I first saw her in 2010 for atrial fibrillation which was new  onset. We attempted a cardioversion but she developed severe bradycardia  after that, and required a pacemaker implantation which was performed on  2011. The second attempt to cardiovert her in 2011 was unsuccessful.    She has been on Coumadin and rate controlled since that time.    She was admitted to [facility] for acute respiratory  failure on 2011 requiring mechanical ventilation secondary to  severe pneumonia. She went into congestive heart failure and atrial  fibrillation with rapid ventricular response. The hospitalization was long  and complex.          I then saw her in the spring of 2012. She was having episodes of dyspnea on  exertion as well as lightheadedness and a lot of fatigue. As it turns out,  by interrogating her pacemaker, we found episodes of atrial fibrillation  with rapid ventricular response. She underwent AV node ablation for atrial  fibrillation with rapid ventricular response on 2012 with Dr. Bear Rodrigues and she did great after that. She became ill however; and  presented to [facility] on 2012. She had  accelerated hypertension and pulmonary edema. She was diuresed and her  blood pressure medications were adjusted.               On 2013, she had a colonoscopy with Dr. Gautam, which showed an ulcerated mass in the  sigmoid colon, which turned out to be positive for colon cancer.  After her colonoscopy then, on  2013, she presented to [facility] with prolonged shoulder  discomfort and  wound up ruling in for a non-ST elevation myocardial infarction. She went  on for cardiac catheterization with Dr. Mayfield. This showed normal left main coronary  artery, normal non-dominant left circumflex vessel, 99% proximal LAD stenosis going into  the mid-LAD involving the origin of the second diagonal branch with a 75% ostial second  diagonal stenosis. The first diagonal branch was normal. The right coronary artery showed  50% at the posterolateral arteries. Her ejection fraction was reduced at 35-40%. She went  on to receive a 2.25 x 15 mm drug-eluting stent to the second diagonal branch of the left  anterior descending artery and then she received a 2.75 x 18 mm long Xience drug-eluting  stent into the midleft anterior descending artery. Then she received a 3.5 x 23 mm long  Xience drug-eluting stent in the proximal left anterior descending artery.       She had an echocardiogram on 2013 which revealed an ejection  fraction of 53%, apical wall hypokinesis and moderate mitral insufficiency,  moderate-to-severe tricuspid insufficiency and grade III diastolic dysfunction. She also  had a PET stress study done on 2013 which revealed a moderate area of myocardial  infarction in the apical wall with no ischemia. There was apical akinesis and the  ejection fraction was 36%. She had a CT of the abdomen and pelvis on 2014 showing  no metastatic disease, sigmoid thickening with malignancy there and a 3.6 cm infrarenal  abdominal aortic aneurysm. She has an adrenal adenoma which really is unchanged.    Her  Claudio  around 2013 and this has been very  difficult for her.       She had her colon surgery which was done for colon cancer with Dr. Bernal 2014.  At a semiurgent visit on 2014, she was  short-winded and had edema. It turned out she was quite anemic, and her blood pressure  was low so we decreased her Diovan to 80 mg daily and stopped her Effient.       She saw   Juaquin for her sleep apnea. They  decreased the pressures on her CPAP mask so she is able to wear it for more than three  hours a night.      She has developed an abdominal wall hernia but it is not causing her any pain, so she is going to wear a binder for now.      She was admitted on 08/24/2016, with acute decompensating congestive heart failure, both systolic and diastolic in nature. She had an echocardiogram, which showed her ejection fraction had dropped to 32%. She was treated with IV diuretics. She was very weak and so physical therapy was also consulted on the case. She ruled out for myocardial infarction.  She had a PET stress test done 09/2016 showing ejection fraction of 37% and no evidence of ischemia.     She had a normal pacer check 7/23/18.     She had an echocardiogram done 7/2018 showing ejection fraction 30% with anteroseptal, inferoseptal, apical, apical inferior and apical septal hypokinesis.  Left ventricle is mildly dilated with mild left ventricular hypertrophy and grade 3 fixed restrictive diastolic function.  Left atrial moderate enlargement, moderate tricuspid insufficiency, mild mitral insufficiency.  There was a very small pericardial effusion.     She saw Carolynn in Dr. Reza's office for dyspnea and had an episode near syncope there.  They checked chest x-ray and told her that she had mild heart failure and bronchitis.  They started her on antibiotic and potassium increased her furosemide.  She was having lower extremity edema.  She still has some but it's better.  Her breathing is better.  She has no chest pain.  She doesn't feel her heart racing or skipping.  She's had no syncope.  She's had no fevers but does have a cough and also has drainage.  She has no orthopnea or PND.    Addendum: Chest x-ray done in April showed decompensated congestive heart failure.  I can't define the most recent chest x-ray.    Past Medical History:   Diagnosis Date   • Abdominal aortic aneurysm (CMS/HCC)      3.6 cm infrarenal January 2014   • Acute bronchitis    • Anemia     Secondary to stage 3 chronic kidney disease and hx of iron deficiency; followed by Dr. Manuel Hart   • Anxiety    • Atrial fibrillation (CMS/HCC) 12/2010    Previous cardioversion; status post AV node ablation by Dr. Bear Rodrigues 5/2012   • Back pain    • Breast cancer (CMS/HCC)     Stage I, status post lumpectomy; history of radiation   • CAD (coronary artery disease)    • Cardiomyopathy (CMS/HCC)    • CHF (congestive heart failure) (CMS/HCC)     Acute on chronic systolic and diastolic; followed by Dr. Rhiannon Rios   • Chronic kidney disease     Followed by Dr. Anna Gerardo    • Colon cancer (CMS/HCC)     Stage I, resected 01/27/2014   • COPD (chronic obstructive pulmonary disease) (CMS/HCC)     Oxygen dependent; followed by Dr. Reza   • Esophageal reflux    • Hernia of anterior abdominal wall 3/11/2016   • Hyperlipidemia    • Hypertension    • Joint pain     IN THE RIGHT KNEE   • Mass of adrenal gland (CMS/HCC)     lesion solitary, CT thereof   • Morbid obesity (CMS/HCC)    • NSTEMI (non-ST elevated myocardial infarction) (CMS/HCC) 11/2013   • Oral thrush    • HAYDEN on CPAP     Followed by Dr. Reza   • Osteoarthritis    • Pneumonia     onset date: 01 Mar 2011, Severe pneumonia w mechanical ventilation, [facility] at [facility]   • Sick sinus syndrome (CMS/HCC)     Severe bradycardia; status post pacemaker implantation January 2011   • Strong family history of breast cancer    • Type 2 diabetes mellitus (CMS/HCC)    • Ventral hernia    • Vitamin D deficiency          Past Surgical History:   Procedure Laterality Date   • AV NODE ABLATION  05/2012    Dr. Bear Rodrigues   • BACK SURGERY     • BREAST BIOPSY     • BREAST LUMPECTOMY     • BREAST SURGERY     • CARDIAC PACEMAKER PLACEMENT     • COLON SURGERY  01/27/2014    resection for colon cancer   • CORONARY ANGIOPLASTY WITH STENT PLACEMENT  11/2013    2.25×15 mm drug-eluting stent to  the second diagonal of the LAD, 2.75×18 mm Xience drug-eluting stent to the mid LAD, and 3.5×23 mm Xience drug-eluting stent to the proximal LAD   • HAND SURGERY     • HYSTERECTOMY     • TIBIA FRACTURE SURGERY      left   • TOE SURGERY      bilat all toenails removed x3 r/t fungus       Current Outpatient Prescriptions on File Prior to Visit   Medication Sig Dispense Refill   • ADVAIR DISKUS 250-50 MCG/DOSE DISKUS Inhale 1 puff 2 (two) times a day.     • albuterol (PROVENTIL) (2.5 MG/3ML) 0.083% nebulizer solution INHALE THE CONTENTS OF ONE VIAL (3ML) EVERY 4 HOURS AS NEEDED  5   • allopurinol (ZYLOPRIM) 300 MG tablet Take 1 tablet by mouth Daily. 90 tablet 3   • atorvastatin (LIPITOR) 20 MG tablet TAKE ONE TABLET BY MOUTH DAILY 90 tablet 0   • carvedilol (COREG) 25 MG tablet TAKE ONE TABLET IN THE MORNING, 1/2 TABLET AT NOON AND 1/2 TABLET AT BEDTIME (Patient taking differently: Take 25 mg by mouth 2 (Two) Times a Day With Meals. TAKE ONE TABLET IN THE MORNING, 1/2 TABLET AT NOON AND 1/2 TABLET AT BEDTIME ) 180 tablet 1   • dabigatran etexilate (PRADAXA) 150 MG capsu Take 1 capsule by mouth Every 12 (Twelve) Hours. 60 capsule 0   • diphenhydrAMINE (BENADRYL) 25 MG tablet Take 25 mg by mouth Every 6 (Six) Hours As Needed for Itching.     • fluticasone (FLONASE) 50 MCG/ACT nasal spray 2 sprays into each nostril Daily.     • furosemide (LASIX) 40 MG tablet TAKE 1 TABLET BY MOUTH AM AND 1 TABLET MID DAY AND 1/2 TABLET  tablet 2   • GLOBAL EASE INJECT PEN NEEDLES 32G X 4 MM misc USE AS DIRECTED 100 each 0   • Insulin Glargine 300 UNIT/ML solution pen-injector Inject 50 Units under the skin Daily. 9 pen 3   • ipratropium-albuterol (DUO-NEB) 0.5-2.5 mg/mL nebulizer INHALE THE contents OF ONE vial using nebulizer FOUR TIMES DAILY AS NEEDED  5   • levocetirizine (XYZAL) 5 MG tablet Take 5 mg by mouth Every Evening.     • Melatonin 10 MG capsule Take 1 capsule by mouth every night.     • nitroglycerin (NITROSTAT) 0.4  MG SL tablet Place 1 tablet under the tongue Every 5 (Five) Minutes As Needed for chest pain. 25 tablet 0   • nystatin (MYCOSTATIN) 142564 UNIT/ML suspension SWISH AND swallow 4ML'S BY MOUTH FOUR TIMES DAILY  1   • omeprazole (priLOSEC) 40 MG capsule Take 40 mg by mouth Daily.     • TRADJENTA 5 MG tablet tablet Take 1 tablet by mouth Daily. 90 tablet 3     No current facility-administered medications on file prior to visit.        Social History     Social History   • Marital status:      Spouse name: N/A   • Number of children: N/A   • Years of education: N/A     Occupational History   • Retired      Social History Main Topics   • Smoking status: Former Smoker     Packs/day: 1.00     Years: 25.00     Quit date: 2004   • Smokeless tobacco: Never Used      Comment: Daily caffeine use   • Alcohol use Yes      Comment: seldom   • Drug use: No   • Sexual activity: Defer     Other Topics Concern   • Not on file     Social History Narrative   • No narrative on file           Review of Systems   Constitution: Negative.   HENT: Negative for congestion.    Eyes: Negative for vision loss in left eye and vision loss in right eye.   Respiratory: Negative.  Negative for cough, hemoptysis, shortness of breath, sleep disturbances due to breathing, snoring, sputum production and wheezing.    Endocrine: Negative.    Hematologic/Lymphatic: Negative.    Skin: Negative for poor wound healing and rash.   Musculoskeletal: Negative for falls, gout, muscle cramps and myalgias.   Gastrointestinal: Negative for abdominal pain, diarrhea, dysphagia, hematemesis, melena, nausea and vomiting.   Neurological: Negative for excessive daytime sleepiness, dizziness, headaches, light-headedness, loss of balance, seizures and vertigo.   Psychiatric/Behavioral: Negative for depression and substance abuse. The patient is not nervous/anxious.        Procedures  Procedures        Objective:      Vitals:    10/22/18 1248   BP: 134/70   BP Location:  "Right arm   Patient Position: Sitting   Pulse: 55   Weight: 116 kg (255 lb)   Height: 162.6 cm (64\")     Body mass index is 43.77 kg/m².    Physical Exam   Constitutional: She is oriented to person, place, and time. She appears well-developed and well-nourished. No distress.   HENT:   Head: Normocephalic and atraumatic.   Eyes: Conjunctivae are normal. No scleral icterus.   Neck: Neck supple. No JVD present. Carotid bruit is not present. No thyromegaly present.   Cardiovascular: Normal rate, regular rhythm, S1 normal, S2 normal, normal heart sounds and intact distal pulses.   No extrasystoles are present. PMI is not displaced.    No murmur heard.  Pulses:       Carotid pulses are 2+ on the right side, and 2+ on the left side.       Radial pulses are 2+ on the right side, and 2+ on the left side.        Dorsalis pedis pulses are 2+ on the right side, and 2+ on the left side.        Posterior tibial pulses are 2+ on the right side, and 2+ on the left side.   Pulmonary/Chest: Effort normal and breath sounds normal. No respiratory distress. She has no wheezes. She has no rhonchi. She has no rales. She exhibits no tenderness.   Abdominal: Soft. Bowel sounds are normal. She exhibits no distension, no abdominal bruit and no mass. There is no tenderness.   Musculoskeletal: She exhibits no edema or deformity.   Lymphadenopathy:     She has no cervical adenopathy.   Neurological: She is alert and oriented to person, place, and time. No cranial nerve deficit.   Skin: Skin is warm and dry. No rash noted. She is not diaphoretic. No cyanosis. No pallor. Nails show no clubbing.   Psychiatric: She has a normal mood and affect. Judgment normal.   Vitals reviewed.      Lab Review:       Assessment:      Diagnosis Plan   1. Chronic coronary artery disease     2. Ischemic cardiomyopathy  Basic Metabolic Panel    CBC & Differential    proBNP   3. Permanent atrial fibrillation (CMS/HCC)     4. Essential hypertension     5. Non-rheumatic " mitral regurgitation     6. HAYDEN (obstructive sleep apnea)     7. Uncontrolled type 2 diabetes mellitus with complication, with long-term current use of insulin (CMS/[facility])     8. H/O cardiac pacemaker     9. Acute combined systolic and diastolic congestive heart failure (CMS/[facility])   proBNP     1. History of congestive heart failure, resolved. These episodes have been due to    hypertension. In hospital 8/2016 for CHF. Mild chf now.    3. Hypertension. Blood pressure is well controlled at home..  4. Adrenal mass, 3.5 cm, followed by Dr. Felton.    5. Small abdominal aortic aneurysm, 3.6 cm by CTA 01/20/2014.    6. Chronic atrial fibrillation status post AV node ablation with pacemaker. Stay on Pradaxa 150 mg twice daily with food.  7. Diabetes mellitus type 2. Followed by Dr. Balbuena.  8. Morbid obesity. Is trying to lose weight.  9. Gastroesophageal reflux disease.    10. Chronic obstructive pulmonary disease.    11. History of breast cancer with lumpectomy on the right breast.    12. Obstructive sleep apnea on CPAP.    13. Anxiety.    14. Dizziness worse with anemia.  15. High social stressors.    16. Colon cancer. Dr. Joaquim Bernal colectomy surgery 01/27/2014.    17. NSTEMI 11/17/13 status post LAD and 2nd diagonal stents  at [facility] with Dr. Mayfield. Echocardiogram showed LvEF 30% on 7/2018.   18. Anemia, sees Dr. Hart. Her upper and lower endoscopy with Dr. Bernal in 2015 were normal. This is under better control now.   19. CKD, sees Chata Gerardo.   20. Ischemic cardiomyopathy.      Plan:       See melisa in 1 month. Better but still has mild chf. Labs today. Will get cxr from Dr. Reza.     Atrial Fibrillation and Atrial Flutter  Assessment  • The patient has permanent atrial fibrillation  • This is non-valvular in etiology  • The patient's CHADS2-VASc score is 7  • A [id number] score of 2 or more is considered a high risk for a thromboembolic event  • Dabigatran prescribed    Plan  • Continue in  atrial fibrillation with rate control  • Continue dabigatran for antithrombotic therapy, bleeding issues discussed  • Continue beta blocker for rate control    Coronary Artery Disease  Assessment  • The patient has no angina    Subjective - Objective  • There is a history of past MI  • There has been a previous stent procedure using SERGIO        Heart Failure  Assessment  • NYHA class III-A - There is limitation of physical activity. The patient is comfortable at rest, but ordinary activity causes fatigue, palpitations or shortness of breath.  • Beta blocker prescribed  • Diuretics prescribed  • Left ventricular function is moderately reduced by qualitative assessment    Plan  • The heart failure care plan was discussed with the patient today including: up-titrating HF medications    Subjective/Objective  • Physical exam findings positive for peripheral edema.   • Physical exam findings negative for rales and elevated JVP.

## 2018-11-23 NOTE — TELEPHONE ENCOUNTER
----- Message from Cora Styles sent at 11/19/2018  4:00 PM EST -----  Contact: patient   Medication:  Insulin Glargine 300 UNIT/ML solution pen-injector   Message: patient has called in regards to getting samples of the above medication. Patient will be on campus on 11/26/2018. patient is requesting to  these samples on 11/26/2018. Please call the patient back if we do or do not have the samples. [phone]          Patient informed samples were ready

## 2018-11-26 NOTE — PROGRESS NOTES
Date of Office Visit: 2018  Encounter Provider: REGAN Welch  Place of Service: [facility]  Patient Name: Renuka Patel  :1940    Chief Complaint   Patient presents with   • Acute on chronic combined systolic and diastolic CHF     Follow up   :     HPI: Renuka Patel is a 78 y.o. female who presents today for cardiac follow up. She has a history of atrial fibrillation dating back to 2010, sick sinus syndrome, severe bradycardia, pacemaker implant 2011.  She underwent AV node ablation for atrial fibrillation in May 2012 by Dr. Bear Rodrigues.  She has a history of obstructive sleep apnea and is compliant with her CPAP machine, obesity, colon cancer status post colon resection, non-STEMI 2013, coronary artery disease, and status post stent placement ×3 in .  She was diagnosed with systolic and diastolic heart failure in 2016 with an EF of 31%, moderate valvular insufficiency of the mitral and tricuspid valves and moderate pulmonary hypertension.  She had a cardiac PET scan in  which showed no evidence of ischemia.    I evaluated her in August and she reported chronic shortness of breath and was oxygen dependent followed by Dr. Reza.  She also reported mild lower extremity edema and chronic dizziness. She had a repeat echocardiogram in 2018 which revealed an EF of 30%, wall motion abnormalities, left ventricle mildly dilated, mild LVH, grade 3 diastolic dysfunction with fixed restrictive pattern, left atrium volume moderately increased, mild aortic calcification, mild mitral annular calcification, mild mitral valve regurgitation, moderate tricuspid valve regurgitation, pulmonary hypertension, and small pericardial effusion without evidence of cardiac tamponade.    She was last in the office by Dr. Rios on 10/22.  She reported an episode of dyspnea and near syncope while at Dr. Reza's office.  The patient  told Dr. Rios that she had had a chest x-ray completed at their office and was told that she had bronchitis and heart failure.  Dr. Rios ordered a pro BNP which was elevated at 3128, BUN 27, creatinine 1.37, hemoglobin 11.1 and hematocrit 35.5.      She presents today for follow-up visit.  Both her shortness of breath and lower extremity edema has improved.  She denies PND, orthopnea, palpitations, dizziness, or syncope.  Her blood pressure and heart rate are both normal today.    Past Medical History:   Diagnosis Date   • Abdominal aortic aneurysm (CMS/HCC)     3.6 cm infrarenal January 2014   • Acute bronchitis    • Anemia     Secondary to stage 3 chronic kidney disease and hx of iron deficiency; followed by Dr. Manuel Hart   • Anxiety    • Atrial fibrillation (CMS/HCC) 12/2010    Previous cardioversion; status post AV node ablation by Dr. Bear Rodrigues 5/2012   • Back pain    • Breast cancer (CMS/HCC)     Stage I, status post lumpectomy; history of radiation   • CAD (coronary artery disease)    • Cardiomyopathy (CMS/HCC)    • CHF (congestive heart failure) (CMS/HCC)     Acute on chronic systolic and diastolic; followed by Dr. Rhiannon Rios   • Chronic kidney disease     Followed by Dr. Anna Gerardo    • Colon cancer (CMS/HCC)     Stage I, resected 01/27/2014   • COPD (chronic obstructive pulmonary disease) (CMS/HCC)     Oxygen dependent; followed by Dr. Reza   • Esophageal reflux    • Hernia of anterior abdominal wall 3/11/2016   • Hyperlipidemia    • Hypertension    • Joint pain     IN THE RIGHT KNEE   • Mass of adrenal gland (CMS/HCC)     lesion solitary, CT thereof   • Morbid obesity (CMS/HCC)    • NSTEMI (non-ST elevated myocardial infarction) (CMS/HCC) 11/2013   • Oral thrush    • HAYDEN on CPAP     Followed by Dr. Reza   • Osteoarthritis    • Pneumonia     onset date: 01 Mar 2011, Severe pneumonia w mechanical ventilation, [facility] at [facility]. Patricia Britton   • Sick sinus syndrome (CMS/HCC)      Severe bradycardia; status post pacemaker implantation January 2011   • Strong family history of breast cancer    • Type 2 diabetes mellitus (CMS/HCC)    • Ventral hernia    • Vitamin D deficiency        Past Surgical History:   Procedure Laterality Date   • AV NODE ABLATION  05/2012    Dr. Bear Rodrigues   • BACK SURGERY     • BREAST BIOPSY     • BREAST LUMPECTOMY     • BREAST SURGERY     • CARDIAC PACEMAKER PLACEMENT     • COLON SURGERY  01/27/2014    resection for colon cancer   • CORONARY ANGIOPLASTY WITH STENT PLACEMENT  11/2013    2.25×15 mm drug-eluting stent to the second diagonal of the LAD, 2.75×18 mm Xience drug-eluting stent to the mid LAD, and 3.5×23 mm Xience drug-eluting stent to the proximal LAD   • HAND SURGERY     • HYSTERECTOMY     • TIBIA FRACTURE SURGERY      left   • TOE SURGERY      bilat all toenails removed x3 r/t fungus       Social History     Social History   • Marital status:      Spouse name: N/A   • Number of children: N/A   • Years of education: N/A     Occupational History   • Retired      Social History Main Topics   • Smoking status: Former Smoker     Packs/day: 1.00     Years: 25.00     Quit date: 2004   • Smokeless tobacco: Never Used      Comment: Daily caffeine use   • Alcohol use Yes      Comment: seldom   • Drug use: No   • Sexual activity: Defer       Family History   Problem Relation Age of Onset   • Breast cancer Mother 60   • Breast cancer Sister 50   • Arrhythmia Sister    • Hypertension Sister    • Brain cancer Father 38   • Breast cancer Sister 40   • Heart attack Brother    • Hypertension Brother    • Diabetes Son    • Hypertension Son    • Heart disease Maternal Grandmother        Review of Systems   Constitution: Negative for chills, diaphoresis, fever, malaise/fatigue, night sweats, weight gain and weight loss.   HENT: Negative for hearing loss, nosebleeds, sore throat and tinnitus.    Eyes: Negative for blurred vision, double vision, pain and visual  disturbance.   Cardiovascular: Positive for dyspnea on exertion and leg swelling. Negative for chest pain, claudication, cyanosis, irregular heartbeat, near-syncope, orthopnea, palpitations, paroxysmal nocturnal dyspnea and syncope.   Respiratory: Positive for cough. Negative for hemoptysis, snoring and wheezing.    Endocrine: Negative for cold intolerance, heat intolerance and polyuria.   Hematologic/Lymphatic: Negative for bleeding problem. Does not bruise/bleed easily.   Skin: Negative for color change, dry skin, flushing and itching.   Musculoskeletal: Negative for falls, joint pain, joint swelling, muscle cramps, muscle weakness and myalgias.   Gastrointestinal: Negative for abdominal pain, constipation, heartburn, melena, nausea and vomiting.   Genitourinary: Negative for dysuria and hematuria.   Neurological: Negative for excessive daytime sleepiness, dizziness, light-headedness, loss of balance, numbness, paresthesias, seizures and vertigo.   Psychiatric/Behavioral: Negative for altered mental status, depression, memory loss and substance abuse. The patient does not have insomnia and is not nervous/anxious.    Allergic/Immunologic: Negative for environmental allergies.       Allergies   Allergen Reactions   • Codeine Itching     Edema   • Hydralazine Hcl      BP drops.   • Lisinopril Cough     Edema         Current Outpatient Medications:   •  ADVAIR DISKUS 250-50 MCG/DOSE DISKUS, Inhale 1 puff 2 (two) times a day., Disp: , Rfl:   •  albuterol (PROVENTIL) (2.5 MG/3ML) 0.083% nebulizer solution, INHALE THE CONTENTS OF ONE VIAL (3ML) EVERY 4 HOURS AS NEEDED, Disp: , Rfl: 5  •  allopurinol (ZYLOPRIM) 300 MG tablet, Take 1 tablet by mouth Daily., Disp: 90 tablet, Rfl: 3  •  atorvastatin (LIPITOR) 20 MG tablet, TAKE ONE TABLET BY MOUTH DAILY, Disp: 90 tablet, Rfl: 0  •  carvedilol (COREG) 25 MG tablet, TAKE ONE TABLET IN THE MORNING, 1/2 TABLET AT NOON AND 1/2 TABLET AT BEDTIME (Patient taking differently: Take  25 mg by mouth 2 (Two) Times a Day With Meals. TAKE ONE TABLET IN THE MORNING, 1/2 TABLET AT NOON AND 1/2 TABLET AT BEDTIME ), Disp: 180 tablet, Rfl: 1  •  cefdinir (OMNICEF) 300 MG capsule, Take 300 mg by mouth 2 (Two) Times a Day., Disp: , Rfl: 0  •  dabigatran etexilate (PRADAXA) 150 MG capsu, Take 1 capsule by mouth Every 12 (Twelve) Hours., Disp: 60 capsule, Rfl: 0  •  diphenhydrAMINE (BENADRYL) 25 MG tablet, Take 25 mg by mouth Every 6 (Six) Hours As Needed for Itching., Disp: , Rfl:   •  fluticasone (FLONASE) 50 MCG/ACT nasal spray, 2 sprays into each nostril Daily., Disp: , Rfl:   •  furosemide (LASIX) 40 MG tablet, TAKE 1 TABLET BY MOUTH AM AND 1 TABLET MID DAY AND 1/2 TABLET PM, Disp: 225 tablet, Rfl: 2  •  GLOBAL EASE INJECT PEN NEEDLES 32G X 4 MM misc, USE AS DIRECTED, Disp: 100 each, Rfl: 0  •  Insulin Glargine 300 UNIT/ML solution pen-injector, Inject 50 Units under the skin Daily., Disp: 9 pen, Rfl: 3  •  ipratropium-albuterol (DUO-NEB) 0.5-2.5 mg/mL nebulizer, INHALE THE contents OF ONE vial using nebulizer FOUR TIMES DAILY AS NEEDED, Disp: , Rfl: 5  •  levocetirizine (XYZAL) 5 MG tablet, Take 5 mg by mouth Every Evening., Disp: , Rfl:   •  Melatonin 10 MG capsule, Take 1 capsule by mouth every night., Disp: , Rfl:   •  nitroglycerin (NITROSTAT) 0.4 MG SL tablet, Place 1 tablet under the tongue Every 5 (Five) Minutes As Needed for chest pain., Disp: 25 tablet, Rfl: 0  •  nystatin (MYCOSTATIN) 632011 UNIT/ML suspension, SWISH AND swallow 4ML'S BY MOUTH FOUR TIMES DAILY, Disp: , Rfl: 1  •  omeprazole (priLOSEC) 40 MG capsule, Take 40 mg by mouth Daily., Disp: , Rfl:   •  potassium chloride (K-DUR,KLOR-CON) 20 MEQ CR tablet, Take 1 tablet by mouth Daily., Disp: 90 tablet, Rfl: 3  •  TRADJENTA 5 MG tablet tablet, Take 1 tablet by mouth Daily., Disp: 90 tablet, Rfl: 3      Objective:     Vitals:    11/26/18 1319   BP: 130/76   BP Location: Left arm   Pulse: 69   Weight: 117 kg (257 lb 6.4 oz)   Height:  "162.6 cm (64\")     Body mass index is 44.18 kg/m².    PHYSICAL EXAM:    Vitals Reviewed.   General Appearance: No acute distress, well developed and well nourished. Obese.   Eyes: Conjunctiva and lids: No erythema, swelling, or discharge. Sclera non-icteric.   HENT: Atraumatic, normocephalic. External eyes, ears, and nose normal. No hearing loss noted. Mucous membranes normal. Lips not cyanotic. Neck supple with no tenderness.  Respiratory: No signs of respiratory distress. Respiration rhythm and depth normal.   Clear to auscultation. No rales, crackles, rhonchi, or wheezing auscultated.   Cardiovascular:  Jugular Venous Pressure: Normal  Heart Rate and Rhythm: Irregular.  Pacemaker present.  Heart Sounds: Normal S1 and S2. No S3 or S4 noted.  Murmurs: No murmurs noted. No rubs, thrills, or gallops.   Arterial Pulses: Carotid pulses normal. No carotid bruit noted. Posterior tibialis and dorsalis pedis pulses normal.   Lower Extremities: Bilateral trace lower extremity edema noted.  Gastrointestinal:  Abdomen soft, non-distended, non-tender. Normal bowel sounds. No hepatomegaly.   Musculoskeletal: Normal movement of extremities  Skin and Nails: General appearance normal. No pallor, cyanosis, diaphoresis. Skin temperature normal. No clubbing of fingernails.   Psychiatric: Patient alert and oriented to person, place, and time. Speech and behavior appropriate. Normal mood and affect.     Procedures     EKG not completed today as she declined.  I reviewed her EKG from August 2016 which showed atrial flutter with ventricular pacing.     Assessment:       Diagnosis Plan   1. Acute on chronic combined systolic and diastolic CHF (congestive heart failure) (CMS/HCC)     2. Shortness of breath     3. Chronic coronary artery disease     4. Permanent atrial fibrillation (CMS/HCC)     5. Morbid obesity with BMI of 40.0-44.9, adult (CMS/HCC)     6. Chronic obstructive pulmonary disease, unspecified COPD type (CMS/HCC)     7. " Non-rheumatic mitral regurgitation     8. H/O cardiac pacemaker     9. Stage 3 chronic kidney disease (CMS/[facility])            Plan:       1. Acute on Chronic Systolic and Diastolic Heart Failure: She feels that both her lower extremity edema and shortness of breath have improved over the past month.  I've recommended that she continue with her current dose of furosemide.  Her weights have been stable.    Heart Failure  Assessment  • NYHA class III-A - There is limitation of physical activity. The patient is comfortable at rest, but ordinary activity causes fatigue, palpitations or shortness of breath.  • Beta blocker prescribed  • Diuretics prescribed  • Left ventricular function is moderately reduced by qualitative assessment    Plan  • The patient has received heart failure education on the following topics: medication instructions, symptom management and weight monitoring    Subjective/Objective  • Physical exam findings positive for peripheral edema.       2.  Coronary Artery Disease: Status post non-STEMI and multiple drug-eluting stent placement 11/2013.  Denies anginal symptoms.    Coronary Artery Disease  Assessment  • The patient has no angina    Plan  • Lifestyle modifications discussed include adhering to a heart healthy diet, maintenance of a healthy weight, medication compliance, regular exercise and regular monitoring of cholesterol and blood pressure    Subjective - Objective  • There is a history of past MI  • There has been a previous stent procedure using SERGIO        3.  Atrial Fibrillation: Persistent and remains on carvedilol and dabigatran.  Denies any bleeding.    Atrial Fibrillation and Atrial Flutter  Assessment  • The patient has persistent atrial fibrillation  • The patient's CHADS2-VASc score is 7  • A [id number] score of 2 or more is considered a high risk for a thromboembolic event  • Dabigatran prescribed    Plan  • Continue in atrial fibrillation with rate control  • Continue dabigatran  for antithrombotic therapy, bleeding issues discussed  • Continue beta blocker for rate control    Subjective - Objective  • The patient underwent cardioversion   • The patient had atrial fibrillation ablation   • The patient had a recurrence of atrial fibrillation since ablation         4.  Sick Sinus Syndrome: Status post pacemaker placement.      5.  Mitral Regurgitation: Previously was moderate and now mild per last echocardiogram.    6.  Hyperlipidemia:Lipid panel 6/25/18: Total cholesterol 137, triglycerides 170, HDL 51, and LDL 52.    7.  Chronic Kidney Disease: CMP in October 2018 showed BUN 27 and creatinine 1.37.  Sodium and potassium normal.  Followed by Dr. Anna Gerardo.     8.  Obstructive Sleep apnea: Compliant with CPAP machine.    9.  Follow up with Dr. Rios in 3 months.    As always, it has been a pleasure to participate in your patient's care.      Sincerely,         REGAN Fernández

## 2018-12-17 NOTE — PROGRESS NOTES
Subjective .     REASONS FOR FOLLOWUP:  Anemia, cancer    HISTORY OF PRESENT ILLNESS:  The patient is a 78 y.o. year old female  who is here for follow-up with the above-mentioned history.    No change in baseline shortness of breath.  Denies bleeding.  Denies chest pain.    More fatigued recently.    Past Medical History:   Diagnosis Date   • Abdominal aortic aneurysm (CMS/HCC)     3.6 cm infrarenal January 2014   • Acute bronchitis    • Anemia     Secondary to stage 3 chronic kidney disease and hx of iron deficiency; followed by Dr. Manuel Hart   • Anxiety    • Atrial fibrillation (CMS/HCC) 12/2010    Previous cardioversion; status post AV node ablation by Dr. Bear Rodrigues 5/2012   • Back pain    • Breast cancer (CMS/HCC)     Stage I, status post lumpectomy; history of radiation   • CAD (coronary artery disease)    • Cardiomyopathy (CMS/HCC)    • CHF (congestive heart failure) (CMS/HCC)     Acute on chronic systolic and diastolic; followed by Dr. Rhiannon Rios   • Chronic kidney disease     Followed by Dr. Anna Gerardo    • Colon cancer (CMS/HCC)     Stage I, resected 01/27/2014   • COPD (chronic obstructive pulmonary disease) (CMS/HCC)     Oxygen dependent; followed by Dr. Reza   • Esophageal reflux    • Hernia of anterior abdominal wall 3/11/2016   • Hyperlipidemia    • Hypertension    • Joint pain     IN THE RIGHT KNEE   • Mass of adrenal gland (CMS/HCC)     lesion solitary, CT thereof   • Morbid obesity (CMS/HCC)    • NSTEMI (non-ST elevated myocardial infarction) (CMS/HCC) 11/2013   • Oral thrush    • HAYDEN on CPAP     Followed by Dr. Reza   • Osteoarthritis    • Pneumonia     onset date: 01 Mar 2011, Severe pneumonia w mechanical ventilation, treated at [facility]   • Sick sinus syndrome (CMS/HCC)     Severe bradycardia; status post pacemaker implantation January 2011   • Strong family history of breast cancer    • Type 2 diabetes mellitus (CMS/HCC)    • Ventral hernia    • Vitamin D  deficiency        HEMATOLOGIC/ONCOLOGIC HISTORY:  (History from previous dates can be found in the separate document.)    MEDICATIONS    Current Outpatient Medications:   •  albuterol (PROVENTIL) (2.5 MG/3ML) 0.083% nebulizer solution, INHALE THE CONTENTS OF ONE VIAL (3ML) EVERY 4 HOURS AS NEEDED, Disp: , Rfl: 5  •  allopurinol (ZYLOPRIM) 300 MG tablet, Take 1 tablet by mouth Daily., Disp: 90 tablet, Rfl: 3  •  atorvastatin (LIPITOR) 20 MG tablet, TAKE ONE TABLET BY MOUTH DAILY, Disp: 90 tablet, Rfl: 0  •  carvedilol (COREG) 25 MG tablet, TAKE ONE TABLET IN THE MORNING, 1/2 TABLET AT NOON AND 1/2 TABLET AT BEDTIME (Patient taking differently: Take 25 mg by mouth 2 (Two) Times a Day With Meals. TAKE ONE TABLET IN THE MORNING, 1/2 TABLET AT NOON AND 1/2 TABLET AT BEDTIME ), Disp: 180 tablet, Rfl: 1  •  dabigatran etexilate (PRADAXA) 150 MG capsu, Take 1 capsule by mouth Every 12 (Twelve) Hours., Disp: 60 capsule, Rfl: 0  •  diphenhydrAMINE (BENADRYL) 25 MG tablet, Take 25 mg by mouth Every 6 (Six) Hours As Needed for Itching., Disp: , Rfl:   •  fluticasone (FLONASE) 50 MCG/ACT nasal spray, 2 sprays into each nostril Daily., Disp: , Rfl:   •  Fluticasone-Umeclidin-Vilant (TRELEGY ELLIPTA IN), Inhale Daily., Disp: , Rfl:   •  furosemide (LASIX) 40 MG tablet, TAKE 1 TABLET BY MOUTH AM AND 1 TABLET MID DAY AND 1/2 TABLET PM (Patient taking differently: Take 40 mg by mouth 3 (Three) Times a Day.), Disp: 225 tablet, Rfl: 2  •  Insulin Glargine 300 UNIT/ML solution pen-injector, Inject 50 Units under the skin into the appropriate area as directed Daily., Disp: 10 pen, Rfl: 1  •  ipratropium-albuterol (DUO-NEB) 0.5-2.5 mg/mL nebulizer, INHALE THE contents OF ONE vial using nebulizer FOUR TIMES DAILY AS NEEDED, Disp: , Rfl: 5  •  levocetirizine (XYZAL) 5 MG tablet, Take 5 mg by mouth Every Evening., Disp: , Rfl:   •  Melatonin 10 MG capsule, Take 1 capsule by mouth every night., Disp: , Rfl:   •  nitroglycerin (NITROSTAT) 0.4  MG SL tablet, Place 1 tablet under the tongue Every 5 (Five) Minutes As Needed for chest pain., Disp: 25 tablet, Rfl: 0  •  omeprazole (priLOSEC) 40 MG capsule, Take 40 mg by mouth Daily., Disp: , Rfl:   •  potassium chloride (K-DUR,KLOR-CON) 20 MEQ CR tablet, Take 1 tablet by mouth Daily., Disp: 90 tablet, Rfl: 3  •  TRADJENTA 5 MG tablet tablet, Take 1 tablet by mouth Daily., Disp: 90 tablet, Rfl: 3    ALLERGIES:     Allergies   Allergen Reactions   • Codeine Itching     Edema   • Hydralazine Hcl      BP drops.   • Lisinopril Cough     Edema       SOCIAL HISTORY:       Social History     Socioeconomic History   • Marital status:      Spouse name: Not on file   • Number of children: Not on file   • Years of education: Not on file   • Highest education level: Not on file   Social Needs   • Financial resource strain: Not on file   • Food insecurity - worry: Not on file   • Food insecurity - inability: Not on file   • Transportation needs - medical: Not on file   • Transportation needs - non-medical: Not on file   Occupational History   • Occupation: Retired   Tobacco Use   • Smoking status: Former Smoker     Packs/day: 1.00     Years: 25.00     Pack years: 25.00     Last attempt to quit:      Years since quittin.9   • Smokeless tobacco: Never Used   • Tobacco comment: caffeine use   Substance and Sexual Activity   • Alcohol use: Yes     Comment: Rare   • Drug use: No   • Sexual activity: Defer   Other Topics Concern   • Not on file   Social History Narrative   • Not on file         FAMILY HISTORY:  Family History   Problem Relation Age of Onset   • Breast cancer Mother 60   • Breast cancer Sister 50   • Arrhythmia Sister    • Hypertension Sister    • Brain cancer Father 38   • Breast cancer Sister 40   • Heart attack Brother    • Hypertension Brother    • Diabetes Son    • Hypertension Son    • Heart disease Maternal Grandmother        REVIEW OF SYSTEMS:  Review of Systems   Constitutional: Negative  "for activity change.   HENT: Negative for nosebleeds and trouble swallowing.    Respiratory: Positive for shortness of breath. Negative for wheezing.    Cardiovascular: Negative for chest pain and palpitations.   Gastrointestinal: Negative for constipation, diarrhea and nausea.   Genitourinary: Negative for dysuria and hematuria.   Musculoskeletal: Negative for arthralgias and myalgias.   Skin: Negative for rash and wound.   Neurological: Negative for seizures and syncope.   Hematological: Negative for adenopathy. Does not bruise/bleed easily.   Psychiatric/Behavioral: Negative for confusion.        Objective    Vitals:    12/17/18 1520   BP: 146/70   Pulse: 70   Resp: 16   Temp: 98.6 °F (37 °C)   TempSrc: Oral   SpO2: 93%   Weight: 115 kg (254 lb)   Height: 162.6 cm (64.02\")   PainSc: 0-No pain     Current Status 12/17/2018   ECOG score 1      PHYSICAL EXAM:    CONSTITUTIONAL:  Vital signs reviewed.  No distress, looks comfortable.  Overweight.  EYES:  Conjunctiva and lids unremarkable.  PERRLA  EARS,NOSE,MOUTH,THROAT:  Ears and nose appear unremarkable.  Lips, teeth, gums appear unremarkable.  RESPIRATORY:  Normal respiratory effort.  Lungs clear to auscultation bilaterally.  CARDIOVASCULAR:  Normal S1, S2.  No murmurs rubs or gallops.  No significant lower extremity edema.  BREAST: no masses by palpation or inspection   GASTROINTESTINAL: Abdomen appears unremarkable.  Nontender.  No hepatomegaly.  No splenomegaly.  LYMPHATIC:  No cervical, supraclavicular, axillary lymphadenopathy.  SKIN:  Warm.  No rashes.  PSYCHIATRIC:  Normal judgment and insight.  Normal mood and affect.    RECENT LABS:        WBC   Date/Time Value Ref Range Status   12/10/2018 10:32 AM 11.09 (H) 4.00 - 10.00 10*3/mm3 Final     Hemoglobin   Date/Time Value Ref Range Status   12/10/2018 10:32 AM 11.2 (L) 11.5 - 14.9 g/dL Final     Platelets   Date/Time Value Ref Range Status   12/10/2018 10:32  (L) 150 - 375 10*3/mm3 Final "       Assessment/Plan   There are no diagnoses linked to this encounter.    ASSESSMENT:  1.  Iron deficiency anemia. Intolerant and does not respond to oral iron, has used Feraheme. She follows with Dr. Gautam   Iron labs trending down.  Arrange one dose Injectafer.    2. Anemia secondary to stage 3 chronic kidney disease. Procrit started 11/03/2015.   Has not needed Procrit recently.  Continues to not need Procrit.    3. Stage I breast cancer. She completed 5 years of tamoxifen in 2005.   Last mammogram 9/21/17, BIRADS 2.   Remains in remission.    4. Stage I colon adenocarcinom a, resected 01/27/2014. No adjuvant chemotherapy indicated.   Today she is tells me she hasn't been following up with Dr. Gautam.  She states her last colonoscopy was in 2014.  We will refer her for this.    5. Difficult IV stick. She states she has lots of issues with this. I told her at some point she may need a port to have access for IV infusions and blood trans fusions. We discussed some of the risks with this. For now, we will hold off, but if the IV sticks become more difficult, we may need to reconsider this.     6.  Intermittent yeast infection at the folds of her skin under her breasts bilaterally, intermittently. She thinks nystatin cream works better than nystatin powder. She uses Desitin as well.     7. Chronic kidney disease. Creatinine 0.8 and 1.2 on lab checks from 2013. Creatinine was noted to be 1.8 on 10/08/2015 and 11/03/2015. She is now seeing Dr. Isha Kraus of Nephrology for further management of this as well as her hyperkalemia and blood pressure issues.     PLAN:   · 1 dose Injectafer.  · Referral to Dr. Gautam for colonoscopy.  · M.D. 6 months.  Iron labs 1 week before.  · (Previously had every 2 month labs.  No plans for Procrit as she has not needed this for several months.  Was previously receiving Procrit if Hb <10).  · Defer further BMP assessments to her other providers.   · Last mammogram 10/17/18, BI-RADS  1.      Daughter assisted with history.

## 2018-12-18 PROBLEM — K90.9 MALABSORPTION OF IRON: Status: ACTIVE | Noted: 2018-01-01

## 2019-01-01 ENCOUNTER — TELEPHONE (OUTPATIENT)
Dept: CARDIOLOGY | Facility: CLINIC | Age: 79
End: 2019-01-01

## 2019-01-01 ENCOUNTER — APPOINTMENT (OUTPATIENT)
Dept: CARDIOLOGY | Facility: HOSPITAL | Age: 79
End: 2019-01-01

## 2019-01-01 ENCOUNTER — APPOINTMENT (OUTPATIENT)
Dept: ULTRASOUND IMAGING | Facility: HOSPITAL | Age: 79
End: 2019-01-01

## 2019-01-01 ENCOUNTER — LAB (OUTPATIENT)
Dept: ENDOCRINOLOGY | Age: 79
End: 2019-01-01

## 2019-01-01 ENCOUNTER — APPOINTMENT (OUTPATIENT)
Dept: ONCOLOGY | Facility: HOSPITAL | Age: 79
End: 2019-01-01

## 2019-01-01 ENCOUNTER — OFFICE VISIT (OUTPATIENT)
Dept: GASTROENTEROLOGY | Facility: CLINIC | Age: 79
End: 2019-01-01

## 2019-01-01 ENCOUNTER — OFFICE VISIT (OUTPATIENT)
Dept: CARDIOLOGY | Facility: CLINIC | Age: 79
End: 2019-01-01

## 2019-01-01 ENCOUNTER — TELEPHONE (OUTPATIENT)
Dept: ENDOCRINOLOGY | Age: 79
End: 2019-01-01

## 2019-01-01 ENCOUNTER — TELEPHONE (OUTPATIENT)
Dept: ONCOLOGY | Facility: HOSPITAL | Age: 79
End: 2019-01-01

## 2019-01-01 ENCOUNTER — APPOINTMENT (OUTPATIENT)
Dept: LAB | Facility: HOSPITAL | Age: 79
End: 2019-01-01

## 2019-01-01 ENCOUNTER — APPOINTMENT (OUTPATIENT)
Dept: GENERAL RADIOLOGY | Facility: HOSPITAL | Age: 79
End: 2019-01-01

## 2019-01-01 ENCOUNTER — LAB (OUTPATIENT)
Dept: LAB | Facility: HOSPITAL | Age: 79
End: 2019-01-01

## 2019-01-01 ENCOUNTER — INFUSION (OUTPATIENT)
Dept: ONCOLOGY | Facility: HOSPITAL | Age: 79
End: 2019-01-01

## 2019-01-01 ENCOUNTER — TELEPHONE (OUTPATIENT)
Dept: ONCOLOGY | Facility: CLINIC | Age: 79
End: 2019-01-01

## 2019-01-01 ENCOUNTER — OFFICE VISIT (OUTPATIENT)
Dept: ENDOCRINOLOGY | Age: 79
End: 2019-01-01

## 2019-01-01 ENCOUNTER — APPOINTMENT (OUTPATIENT)
Dept: CT IMAGING | Facility: HOSPITAL | Age: 79
End: 2019-01-01

## 2019-01-01 ENCOUNTER — HOSPITAL ENCOUNTER (INPATIENT)
Facility: HOSPITAL | Age: 79
LOS: 4 days | End: 2019-07-14
Attending: HOSPITALIST | Admitting: HOSPITALIST

## 2019-01-01 ENCOUNTER — HOSPITAL ENCOUNTER (OUTPATIENT)
Dept: GENERAL RADIOLOGY | Facility: HOSPITAL | Age: 79
Discharge: HOME | End: 2019-06-07

## 2019-01-01 ENCOUNTER — HOSPITAL ENCOUNTER (OUTPATIENT)
Dept: CARDIOLOGY | Facility: HOSPITAL | Age: 79
Discharge: HOME | End: 2019-06-07
Admitting: NURSE PRACTITIONER

## 2019-01-01 ENCOUNTER — DOCUMENTATION (OUTPATIENT)
Dept: ONCOLOGY | Facility: CLINIC | Age: 79
End: 2019-01-01

## 2019-01-01 ENCOUNTER — TELEPHONE (OUTPATIENT)
Dept: GENERAL RADIOLOGY | Facility: HOSPITAL | Age: 79
End: 2019-01-01

## 2019-01-01 ENCOUNTER — CLINICAL SUPPORT NO REQUIREMENTS (OUTPATIENT)
Dept: CARDIOLOGY | Facility: CLINIC | Age: 79
End: 2019-01-01

## 2019-01-01 ENCOUNTER — CLINICAL SUPPORT (OUTPATIENT)
Dept: ONCOLOGY | Facility: HOSPITAL | Age: 79
End: 2019-01-01

## 2019-01-01 ENCOUNTER — OFFICE VISIT (OUTPATIENT)
Dept: ONCOLOGY | Facility: CLINIC | Age: 79
End: 2019-01-01

## 2019-01-01 ENCOUNTER — PREP FOR SURGERY (OUTPATIENT)
Dept: OTHER | Facility: HOSPITAL | Age: 79
End: 2019-01-01

## 2019-01-01 VITALS
TEMPERATURE: 98.8 F | DIASTOLIC BLOOD PRESSURE: 60 MMHG | WEIGHT: 259.6 LBS | HEIGHT: 64 IN | SYSTOLIC BLOOD PRESSURE: 134 MMHG | BODY MASS INDEX: 44.32 KG/M2

## 2019-01-01 VITALS
BODY MASS INDEX: 44.05 KG/M2 | OXYGEN SATURATION: 92 % | SYSTOLIC BLOOD PRESSURE: 134 MMHG | HEIGHT: 64 IN | DIASTOLIC BLOOD PRESSURE: 72 MMHG | WEIGHT: 258 LBS | HEART RATE: 70 BPM

## 2019-01-01 VITALS
TEMPERATURE: 98 F | RESPIRATION RATE: 16 BRPM | HEART RATE: 70 BPM | DIASTOLIC BLOOD PRESSURE: 83 MMHG | SYSTOLIC BLOOD PRESSURE: 134 MMHG | OXYGEN SATURATION: 97 %

## 2019-01-01 VITALS
BODY MASS INDEX: 44.39 KG/M2 | SYSTOLIC BLOOD PRESSURE: 130 MMHG | WEIGHT: 260 LBS | DIASTOLIC BLOOD PRESSURE: 70 MMHG | HEIGHT: 64 IN

## 2019-01-01 VITALS
WEIGHT: 258 LBS | SYSTOLIC BLOOD PRESSURE: 134 MMHG | BODY MASS INDEX: 44.05 KG/M2 | DIASTOLIC BLOOD PRESSURE: 74 MMHG | HEIGHT: 64 IN

## 2019-01-01 VITALS — TEMPERATURE: 97.6 F | BODY MASS INDEX: 46.86 KG/M2 | WEIGHT: 274.47 LBS | HEIGHT: 64 IN

## 2019-01-01 VITALS
HEIGHT: 64 IN | SYSTOLIC BLOOD PRESSURE: 126 MMHG | DIASTOLIC BLOOD PRESSURE: 80 MMHG | BODY MASS INDEX: 44.53 KG/M2 | HEART RATE: 71 BPM | WEIGHT: 260.8 LBS

## 2019-01-01 DIAGNOSIS — N18.30 ANEMIA DUE TO STAGE 3 CHRONIC KIDNEY DISEASE TREATED WITH ERYTHROPOIETIN (HCC): ICD-10-CM

## 2019-01-01 DIAGNOSIS — E78.2 MIXED HYPERLIPIDEMIA: ICD-10-CM

## 2019-01-01 DIAGNOSIS — I25.10 CHRONIC CORONARY ARTERY DISEASE: Primary | ICD-10-CM

## 2019-01-01 DIAGNOSIS — E55.9 VITAMIN D DEFICIENCY: ICD-10-CM

## 2019-01-01 DIAGNOSIS — C18.4 CANCER OF TRANSVERSE COLON (HCC): ICD-10-CM

## 2019-01-01 DIAGNOSIS — IMO0002 UNCONTROLLED TYPE 2 DIABETES MELLITUS WITH COMPLICATION, WITH LONG-TERM CURRENT USE OF INSULIN: ICD-10-CM

## 2019-01-01 DIAGNOSIS — I10 ESSENTIAL HYPERTENSION: ICD-10-CM

## 2019-01-01 DIAGNOSIS — D63.1 ANEMIA DUE TO STAGE 3 CHRONIC KIDNEY DISEASE TREATED WITH ERYTHROPOIETIN (HCC): Primary | ICD-10-CM

## 2019-01-01 DIAGNOSIS — I25.5 ISCHEMIC CARDIOMYOPATHY: Chronic | ICD-10-CM

## 2019-01-01 DIAGNOSIS — N18.30 ANEMIA DUE TO STAGE 3 CHRONIC KIDNEY DISEASE TREATED WITH ERYTHROPOIETIN (HCC): Primary | ICD-10-CM

## 2019-01-01 DIAGNOSIS — D64.9 ANEMIA, UNSPECIFIED TYPE: Primary | ICD-10-CM

## 2019-01-01 DIAGNOSIS — R06.02 SHORTNESS OF BREATH: ICD-10-CM

## 2019-01-01 DIAGNOSIS — R06.09 DOE (DYSPNEA ON EXERTION): Primary | ICD-10-CM

## 2019-01-01 DIAGNOSIS — IMO0002 UNCONTROLLED TYPE 2 DIABETES MELLITUS WITH COMPLICATION, WITH LONG-TERM CURRENT USE OF INSULIN: Primary | ICD-10-CM

## 2019-01-01 DIAGNOSIS — C18.9 COLON CANCER (HCC): Primary | ICD-10-CM

## 2019-01-01 DIAGNOSIS — E78.2 MIXED HYPERLIPIDEMIA: Primary | ICD-10-CM

## 2019-01-01 DIAGNOSIS — J44.9 CHRONIC OBSTRUCTIVE PULMONARY DISEASE, UNSPECIFIED COPD TYPE (HCC): ICD-10-CM

## 2019-01-01 DIAGNOSIS — D63.1 ANEMIA DUE TO STAGE 3 CHRONIC KIDNEY DISEASE TREATED WITH ERYTHROPOIETIN (HCC): ICD-10-CM

## 2019-01-01 DIAGNOSIS — E66.01 MORBID OBESITY WITH BMI OF 40.0-44.9, ADULT (HCC): ICD-10-CM

## 2019-01-01 DIAGNOSIS — G47.33 OSA (OBSTRUCTIVE SLEEP APNEA): ICD-10-CM

## 2019-01-01 DIAGNOSIS — I44.2 THIRD DEGREE AV BLOCK (HCC): Primary | ICD-10-CM

## 2019-01-01 DIAGNOSIS — Z95.0 H/O CARDIAC PACEMAKER: ICD-10-CM

## 2019-01-01 DIAGNOSIS — I48.20 CHRONIC ATRIAL FIBRILLATION (HCC): ICD-10-CM

## 2019-01-01 DIAGNOSIS — I48.20 CHRONIC ATRIAL FIBRILLATION (HCC): Primary | ICD-10-CM

## 2019-01-01 DIAGNOSIS — R60.0 PEDAL EDEMA: ICD-10-CM

## 2019-01-01 DIAGNOSIS — D50.8 IRON DEFICIENCY ANEMIA REFRACTORY TO IRON THERAPY: ICD-10-CM

## 2019-01-01 DIAGNOSIS — D72.829 LEUKOCYTOSIS, UNSPECIFIED TYPE: ICD-10-CM

## 2019-01-01 DIAGNOSIS — D72.829 LEUKOCYTOSIS, UNSPECIFIED TYPE: Primary | ICD-10-CM

## 2019-01-01 DIAGNOSIS — I50.43 ACUTE ON CHRONIC COMBINED SYSTOLIC AND DIASTOLIC CHF (CONGESTIVE HEART FAILURE) (HCC): ICD-10-CM

## 2019-01-01 DIAGNOSIS — E11.22 TYPE 2 DIABETES MELLITUS WITH STAGE 3 CHRONIC KIDNEY DISEASE, WITH LONG-TERM CURRENT USE OF INSULIN (HCC): Primary | ICD-10-CM

## 2019-01-01 DIAGNOSIS — N18.30 TYPE 2 DIABETES MELLITUS WITH STAGE 3 CHRONIC KIDNEY DISEASE, WITH LONG-TERM CURRENT USE OF INSULIN (HCC): Primary | ICD-10-CM

## 2019-01-01 DIAGNOSIS — D50.8 IRON DEFICIENCY ANEMIA REFRACTORY TO IRON THERAPY: Primary | ICD-10-CM

## 2019-01-01 DIAGNOSIS — I34.0 NON-RHEUMATIC MITRAL REGURGITATION: ICD-10-CM

## 2019-01-01 DIAGNOSIS — R53.1 GENERALIZED WEAKNESS: Primary | ICD-10-CM

## 2019-01-01 DIAGNOSIS — Z79.4 TYPE 2 DIABETES MELLITUS WITH STAGE 3 CHRONIC KIDNEY DISEASE, WITH LONG-TERM CURRENT USE OF INSULIN (HCC): Primary | ICD-10-CM

## 2019-01-01 DIAGNOSIS — I42.0 DILATED CARDIOMYOPATHY (HCC): ICD-10-CM

## 2019-01-01 DIAGNOSIS — I50.43 ACUTE ON CHRONIC COMBINED SYSTOLIC AND DIASTOLIC CONGESTIVE HEART FAILURE (HCC): ICD-10-CM

## 2019-01-01 DIAGNOSIS — I48.21 PERMANENT ATRIAL FIBRILLATION (HCC): ICD-10-CM

## 2019-01-01 LAB
25(OH)D3+25(OH)D2 SERPL-MCNC: 42.7 NG/ML (ref 30–100)
ABO GROUP BLD: NORMAL
ALBUMIN SERPL-MCNC: 1.8 G/DL (ref 3.5–5.2)
ALBUMIN SERPL-MCNC: 2.2 G/DL (ref 3.5–5.2)
ALBUMIN SERPL-MCNC: 2.2 G/DL (ref 3.5–5.2)
ALBUMIN SERPL-MCNC: 2.3 G/DL (ref 3.5–5.2)
ALBUMIN SERPL-MCNC: 2.4 G/DL (ref 3.5–5.2)
ALBUMIN SERPL-MCNC: 3.8 G/DL (ref 3.5–5.2)
ALBUMIN SERPL-MCNC: 3.8 G/DL (ref 3.5–5.2)
ALBUMIN/GLOB SERPL: 0.8 G/DL
ALBUMIN/GLOB SERPL: 0.9 G/DL
ALBUMIN/GLOB SERPL: 1.2 G/DL
ALBUMIN/GLOB SERPL: 1.5 G/DL
ALP SERPL-CCNC: 1040 U/L (ref 39–117)
ALP SERPL-CCNC: 1042 U/L (ref 39–117)
ALP SERPL-CCNC: 1128 U/L (ref 39–117)
ALP SERPL-CCNC: 158 U/L (ref 39–117)
ALP SERPL-CCNC: 526 U/L (ref 39–117)
ALP SERPL-CCNC: 868 U/L (ref 39–117)
ALT SERPL W P-5'-P-CCNC: 19 U/L (ref 1–33)
ALT SERPL W P-5'-P-CCNC: 26 U/L (ref 1–33)
ALT SERPL W P-5'-P-CCNC: 27 U/L (ref 1–33)
ALT SERPL W P-5'-P-CCNC: 35 U/L (ref 1–33)
ALT SERPL W P-5'-P-CCNC: 37 U/L (ref 1–33)
ALT SERPL-CCNC: 17 U/L (ref 1–33)
ANION GAP SERPL CALCULATED.3IONS-SCNC: 15.1 MMOL/L
ANION GAP SERPL CALCULATED.3IONS-SCNC: 16.8 MMOL/L (ref 5–15)
ANION GAP SERPL CALCULATED.3IONS-SCNC: 17.7 MMOL/L (ref 5–15)
ANION GAP SERPL CALCULATED.3IONS-SCNC: 20.2 MMOL/L (ref 5–15)
ANION GAP SERPL CALCULATED.3IONS-SCNC: 22.3 MMOL/L (ref 5–15)
ANION GAP SERPL CALCULATED.3IONS-SCNC: 30.3 MMOL/L (ref 5–15)
ANISOCYTOSIS BLD QL: ABNORMAL
ANISOCYTOSIS BLD QL: ABNORMAL
AORTIC DIMENSIONLESS INDEX: 0.7 (DI)
APTT PPP: 68 SECONDS (ref 22.7–35.4)
ARTERIAL PATENCY WRIST A: ABNORMAL
ARTERIAL PATENCY WRIST A: ABNORMAL
AST SERPL-CCNC: 119 U/L (ref 1–32)
AST SERPL-CCNC: 121 U/L (ref 1–32)
AST SERPL-CCNC: 122 U/L (ref 1–32)
AST SERPL-CCNC: 257 U/L (ref 1–32)
AST SERPL-CCNC: 26 U/L (ref 1–32)
AST SERPL-CCNC: 55 U/L (ref 1–32)
ATMOSPHERIC PRESS: 752.1 MMHG
ATMOSPHERIC PRESS: 752.4 MMHG
B PARAPERT DNA SPEC QL NAA+PROBE: NOT DETECTED
B PERT DNA SPEC QL NAA+PROBE: NOT DETECTED
BACTERIA UR QL AUTO: ABNORMAL /HPF
BASE EXCESS BLDA CALC-SCNC: -10.8 MMOL/L (ref 0–2)
BASE EXCESS BLDA CALC-SCNC: -13.6 MMOL/L (ref 0–2)
BASOPHILS # BLD AUTO: 0.04 10*3/MM3 (ref 0–0.2)
BASOPHILS # BLD AUTO: 0.04 10*3/MM3 (ref 0–0.2)
BASOPHILS # BLD AUTO: 0.05 10*3/MM3 (ref 0–0.2)
BASOPHILS # BLD AUTO: 0.05 10*3/MM3 (ref 0–0.2)
BASOPHILS # BLD AUTO: 0.06 10*3/MM3 (ref 0–0.2)
BASOPHILS NFR BLD AUTO: 0.2 % (ref 0–1.5)
BASOPHILS NFR BLD AUTO: 0.2 % (ref 0–1.5)
BASOPHILS NFR BLD AUTO: 0.3 % (ref 0–1.5)
BASOPHILS NFR BLD AUTO: 0.3 % (ref 0–1.5)
BASOPHILS NFR BLD AUTO: 0.4 % (ref 0–1.5)
BASOPHILS NFR BLD AUTO: 0.4 % (ref 0–1.5)
BASOPHILS NFR BLD AUTO: 0.5 % (ref 0–1.5)
BDY SITE: ABNORMAL
BDY SITE: ABNORMAL
BH CV ECHO MEAS - AO MAX PG (FULL): 4.1 MMHG
BH CV ECHO MEAS - AO MAX PG: 7.1 MMHG
BH CV ECHO MEAS - AO MEAN PG (FULL): 2 MMHG
BH CV ECHO MEAS - AO MEAN PG: 4 MMHG
BH CV ECHO MEAS - AO ROOT AREA (BSA CORRECTED): 1.6
BH CV ECHO MEAS - AO ROOT AREA: 9.6 CM^2
BH CV ECHO MEAS - AO ROOT DIAM: 3.5 CM
BH CV ECHO MEAS - AO V2 MAX: 133 CM/SEC
BH CV ECHO MEAS - AO V2 MEAN: 87.9 CM/SEC
BH CV ECHO MEAS - AO V2 VTI: 22.7 CM
BH CV ECHO MEAS - ASC AORTA: 3.6 CM
BH CV ECHO MEAS - AVA(I,A): 2.7 CM^2
BH CV ECHO MEAS - AVA(I,D): 2.7 CM^2
BH CV ECHO MEAS - AVA(V,A): 2.5 CM^2
BH CV ECHO MEAS - AVA(V,D): 2.5 CM^2
BH CV ECHO MEAS - BSA(HAYCOCK): 2.4 M^2
BH CV ECHO MEAS - BSA: 2.2 M^2
BH CV ECHO MEAS - BZI_BMI: 45.5 KILOGRAMS/M^2
BH CV ECHO MEAS - BZI_METRIC_HEIGHT: 162.6 CM
BH CV ECHO MEAS - BZI_METRIC_WEIGHT: 120.2 KG
BH CV ECHO MEAS - EDV(CUBED): 157.5 ML
BH CV ECHO MEAS - EDV(MOD-SP2): 87 ML
BH CV ECHO MEAS - EDV(MOD-SP4): 99 ML
BH CV ECHO MEAS - EDV(TEICH): 141.3 ML
BH CV ECHO MEAS - EF(CUBED): 70.4 %
BH CV ECHO MEAS - EF(MOD-BP): 62 %
BH CV ECHO MEAS - EF(MOD-SP2): 69 %
BH CV ECHO MEAS - EF(MOD-SP4): 52.5 %
BH CV ECHO MEAS - EF(TEICH): 61.5 %
BH CV ECHO MEAS - ESV(CUBED): 46.7 ML
BH CV ECHO MEAS - ESV(MOD-SP2): 27 ML
BH CV ECHO MEAS - ESV(MOD-SP4): 47 ML
BH CV ECHO MEAS - ESV(TEICH): 54.4 ML
BH CV ECHO MEAS - FS: 33.3 %
BH CV ECHO MEAS - IVS/LVPW: 1
BH CV ECHO MEAS - IVSD: 1.1 CM
BH CV ECHO MEAS - LAT PEAK E' VEL: 7 CM/SEC
BH CV ECHO MEAS - LV DIASTOLIC VOL/BSA (35-75): 44.9 ML/M^2
BH CV ECHO MEAS - LV MASS(C)D: 234.8 GRAMS
BH CV ECHO MEAS - LV MASS(C)DI: 106.5 GRAMS/M^2
BH CV ECHO MEAS - LV MAX PG: 3 MMHG
BH CV ECHO MEAS - LV MEAN PG: 2 MMHG
BH CV ECHO MEAS - LV SYSTOLIC VOL/BSA (12-30): 21.3 ML/M^2
BH CV ECHO MEAS - LV V1 MAX: 86.5 CM/SEC
BH CV ECHO MEAS - LV V1 MEAN: 59.2 CM/SEC
BH CV ECHO MEAS - LV V1 VTI: 15.9 CM
BH CV ECHO MEAS - LVIDD: 5.4 CM
BH CV ECHO MEAS - LVIDS: 3.6 CM
BH CV ECHO MEAS - LVLD AP2: 7.6 CM
BH CV ECHO MEAS - LVLD AP4: 7.5 CM
BH CV ECHO MEAS - LVLS AP2: 7.2 CM
BH CV ECHO MEAS - LVLS AP4: 6.9 CM
BH CV ECHO MEAS - LVOT AREA (M): 3.8 CM^2
BH CV ECHO MEAS - LVOT AREA: 3.8 CM^2
BH CV ECHO MEAS - LVOT DIAM: 2.2 CM
BH CV ECHO MEAS - LVPWD: 1.1 CM
BH CV ECHO MEAS - MED PEAK E' VEL: 5 CM/SEC
BH CV ECHO MEAS - MV DEC SLOPE: 533 CM/SEC^2
BH CV ECHO MEAS - MV DEC TIME: 164 SEC
BH CV ECHO MEAS - MV E MAX VEL: 103 CM/SEC
BH CV ECHO MEAS - MV MAX PG: 5.3 MMHG
BH CV ECHO MEAS - MV MEAN PG: 2 MMHG
BH CV ECHO MEAS - MV P1/2T MAX VEL: 115 CM/SEC
BH CV ECHO MEAS - MV P1/2T: 63.2 MSEC
BH CV ECHO MEAS - MV V2 MAX: 115 CM/SEC
BH CV ECHO MEAS - MV V2 MEAN: 69.7 CM/SEC
BH CV ECHO MEAS - MV V2 VTI: 24.7 CM
BH CV ECHO MEAS - MVA P1/2T LCG: 1.9 CM^2
BH CV ECHO MEAS - MVA(P1/2T): 3.5 CM^2
BH CV ECHO MEAS - MVA(VTI): 2.4 CM^2
BH CV ECHO MEAS - PA ACC TIME: 0.07 SEC
BH CV ECHO MEAS - PA MAX PG (FULL): 1.2 MMHG
BH CV ECHO MEAS - PA MAX PG: 3.5 MMHG
BH CV ECHO MEAS - PA PR(ACCEL): 45.7 MMHG
BH CV ECHO MEAS - PA V2 MAX: 94.1 CM/SEC
BH CV ECHO MEAS - RAP SYSTOLE: 3 MMHG
BH CV ECHO MEAS - RV MAX PG: 2.4 MMHG
BH CV ECHO MEAS - RV MEAN PG: 1 MMHG
BH CV ECHO MEAS - RV V1 MAX: 76.9 CM/SEC
BH CV ECHO MEAS - RV V1 MEAN: 50.6 CM/SEC
BH CV ECHO MEAS - RV V1 VTI: 13.8 CM
BH CV ECHO MEAS - RVSP: 26 MMHG
BH CV ECHO MEAS - SI(AO): 99.1 ML/M^2
BH CV ECHO MEAS - SI(CUBED): 50.3 ML/M^2
BH CV ECHO MEAS - SI(LVOT): 27.4 ML/M^2
BH CV ECHO MEAS - SI(MOD-SP2): 27.2 ML/M^2
BH CV ECHO MEAS - SI(MOD-SP4): 23.6 ML/M^2
BH CV ECHO MEAS - SI(TEICH): 39.4 ML/M^2
BH CV ECHO MEAS - SV(AO): 218.4 ML
BH CV ECHO MEAS - SV(CUBED): 110.8 ML
BH CV ECHO MEAS - SV(LVOT): 60.4 ML
BH CV ECHO MEAS - SV(MOD-SP2): 60 ML
BH CV ECHO MEAS - SV(MOD-SP4): 52 ML
BH CV ECHO MEAS - SV(TEICH): 86.9 ML
BH CV ECHO MEAS - TAPSE (>1.6): 1.8 CM2
BH CV ECHO MEAS - TR MAX VEL: 239 CM/SEC
BH CV ECHO MEASUREMENTS AVERAGE E/E' RATIO: 17.17
BH CV LOWER VASCULAR LEFT COMMON FEMORAL AUGMENT: NORMAL
BH CV LOWER VASCULAR LEFT COMMON FEMORAL COMPETENT: NORMAL
BH CV LOWER VASCULAR LEFT COMMON FEMORAL COMPRESS: NORMAL
BH CV LOWER VASCULAR LEFT COMMON FEMORAL PHASIC: NORMAL
BH CV LOWER VASCULAR LEFT COMMON FEMORAL SPONT: NORMAL
BH CV LOWER VASCULAR LEFT DISTAL FEMORAL COMPRESS: NORMAL
BH CV LOWER VASCULAR LEFT GASTRONEMIUS COMPRESS: NORMAL
BH CV LOWER VASCULAR LEFT GREATER SAPH AK COMPRESS: NORMAL
BH CV LOWER VASCULAR LEFT GREATER SAPH BK COMPRESS: NORMAL
BH CV LOWER VASCULAR LEFT MID FEMORAL AUGMENT: NORMAL
BH CV LOWER VASCULAR LEFT MID FEMORAL COMPETENT: NORMAL
BH CV LOWER VASCULAR LEFT MID FEMORAL COMPRESS: NORMAL
BH CV LOWER VASCULAR LEFT MID FEMORAL PHASIC: NORMAL
BH CV LOWER VASCULAR LEFT MID FEMORAL SPONT: NORMAL
BH CV LOWER VASCULAR LEFT PERONEAL COMPRESS: NORMAL
BH CV LOWER VASCULAR LEFT POPLITEAL AUGMENT: NORMAL
BH CV LOWER VASCULAR LEFT POPLITEAL COMPETENT: NORMAL
BH CV LOWER VASCULAR LEFT POPLITEAL COMPRESS: NORMAL
BH CV LOWER VASCULAR LEFT POPLITEAL PHASIC: NORMAL
BH CV LOWER VASCULAR LEFT POPLITEAL SPONT: NORMAL
BH CV LOWER VASCULAR LEFT POSTERIOR TIBIAL COMPRESS: NORMAL
BH CV LOWER VASCULAR LEFT PROXIMAL FEMORAL COMPRESS: NORMAL
BH CV LOWER VASCULAR LEFT SAPHENOFEMORAL JUNCTION COMPRESS: NORMAL
BH CV LOWER VASCULAR RIGHT COMMON FEMORAL AUGMENT: NORMAL
BH CV LOWER VASCULAR RIGHT COMMON FEMORAL COMPETENT: NORMAL
BH CV LOWER VASCULAR RIGHT COMMON FEMORAL COMPRESS: NORMAL
BH CV LOWER VASCULAR RIGHT COMMON FEMORAL PHASIC: NORMAL
BH CV LOWER VASCULAR RIGHT COMMON FEMORAL SPONT: NORMAL
BH CV LOWER VASCULAR RIGHT DISTAL FEMORAL COMPRESS: NORMAL
BH CV LOWER VASCULAR RIGHT GASTRONEMIUS COMPRESS: NORMAL
BH CV LOWER VASCULAR RIGHT GREATER SAPH AK COMPRESS: NORMAL
BH CV LOWER VASCULAR RIGHT GREATER SAPH BK COMPRESS: NORMAL
BH CV LOWER VASCULAR RIGHT MID FEMORAL AUGMENT: NORMAL
BH CV LOWER VASCULAR RIGHT MID FEMORAL COMPETENT: NORMAL
BH CV LOWER VASCULAR RIGHT MID FEMORAL COMPRESS: NORMAL
BH CV LOWER VASCULAR RIGHT MID FEMORAL PHASIC: NORMAL
BH CV LOWER VASCULAR RIGHT MID FEMORAL SPONT: NORMAL
BH CV LOWER VASCULAR RIGHT PERONEAL COMPRESS: NORMAL
BH CV LOWER VASCULAR RIGHT POPLITEAL AUGMENT: NORMAL
BH CV LOWER VASCULAR RIGHT POPLITEAL COMPETENT: NORMAL
BH CV LOWER VASCULAR RIGHT POPLITEAL COMPRESS: NORMAL
BH CV LOWER VASCULAR RIGHT POPLITEAL PHASIC: NORMAL
BH CV LOWER VASCULAR RIGHT POPLITEAL SPONT: NORMAL
BH CV LOWER VASCULAR RIGHT POSTERIOR TIBIAL COMPRESS: NORMAL
BH CV LOWER VASCULAR RIGHT PROXIMAL FEMORAL COMPRESS: NORMAL
BH CV LOWER VASCULAR RIGHT SAPHENOFEMORAL JUNCTION COMPRESS: NORMAL
BH CV XLRA - RV BASE: 3.7 CM
BH CV XLRA - TDI S': 10 CM/SEC
BILIRUB SERPL-MCNC: 0.4 MG/DL (ref 0.2–1.2)
BILIRUB SERPL-MCNC: 0.6 MG/DL (ref 0.2–1.2)
BILIRUB SERPL-MCNC: 1.1 MG/DL (ref 0.2–1.2)
BILIRUB SERPL-MCNC: 1.2 MG/DL (ref 0.2–1.2)
BILIRUB SERPL-MCNC: 1.3 MG/DL (ref 0.2–1.2)
BILIRUB SERPL-MCNC: 1.3 MG/DL (ref 0.2–1.2)
BILIRUB UR QL STRIP: NEGATIVE
BLD GP AB SCN SERPL QL: NEGATIVE
BUN BLD-MCNC: 24 MG/DL (ref 8–23)
BUN BLD-MCNC: 69 MG/DL (ref 8–23)
BUN BLD-MCNC: 71 MG/DL (ref 8–23)
BUN BLD-MCNC: 79 MG/DL (ref 8–23)
BUN BLD-MCNC: 87 MG/DL (ref 8–23)
BUN BLD-MCNC: 95 MG/DL (ref 8–23)
BUN SERPL-MCNC: 28 MG/DL (ref 8–23)
BUN/CREAT SERPL: 23.1 (ref 7–25)
BUN/CREAT SERPL: 24.8 (ref 7–25)
BUN/CREAT SERPL: 30.1 (ref 7–25)
BUN/CREAT SERPL: 35 (ref 7–25)
BUN/CREAT SERPL: 36.4 (ref 7–25)
BUN/CREAT SERPL: 37.3 (ref 7–25)
BUN/CREAT SERPL: 37.7 (ref 7–25)
C PEPTIDE SERPL-MCNC: 0.9 NG/ML (ref 1.1–4.4)
C PNEUM DNA NPH QL NAA+NON-PROBE: NOT DETECTED
CALCIUM SERPL-MCNC: 8.7 MG/DL (ref 8.6–10.5)
CALCIUM SPEC-SCNC: 7.2 MG/DL (ref 8.6–10.5)
CALCIUM SPEC-SCNC: 8.1 MG/DL (ref 8.6–10.5)
CALCIUM SPEC-SCNC: 8.3 MG/DL (ref 8.6–10.5)
CALCIUM SPEC-SCNC: 8.4 MG/DL (ref 8.6–10.5)
CALCIUM SPEC-SCNC: 8.4 MG/DL (ref 8.6–10.5)
CALCIUM SPEC-SCNC: 8.8 MG/DL (ref 8.6–10.5)
CHLORIDE SERPL-SCNC: 94 MMOL/L (ref 98–107)
CHLORIDE SERPL-SCNC: 97 MMOL/L (ref 98–107)
CHLORIDE SERPL-SCNC: 97 MMOL/L (ref 98–107)
CHLORIDE SERPL-SCNC: 98 MMOL/L (ref 98–107)
CHLORIDE SERPL-SCNC: 99 MMOL/L (ref 98–107)
CHLORIDE UR-SCNC: 40 MMOL/L
CHOLEST SERPL-MCNC: 102 MG/DL (ref 0–200)
CHOLEST SERPL-MCNC: 72 MG/DL (ref 0–200)
CK SERPL-CCNC: 32 U/L (ref 20–180)
CLARITY UR: CLEAR
CO2 SERPL-SCNC: 14.7 MMOL/L (ref 22–29)
CO2 SERPL-SCNC: 19.7 MMOL/L (ref 22–29)
CO2 SERPL-SCNC: 22.8 MMOL/L (ref 22–29)
CO2 SERPL-SCNC: 24.2 MMOL/L (ref 22–29)
CO2 SERPL-SCNC: 24.3 MMOL/L (ref 22–29)
CO2 SERPL-SCNC: 33.7 MMOL/L (ref 22–29)
CO2 SERPL-SCNC: 33.9 MMOL/L (ref 22–29)
COLOR UR: YELLOW
CREAT BLD-MCNC: 1.04 MG/DL (ref 0.57–1)
CREAT BLD-MCNC: 1.95 MG/DL (ref 0.57–1)
CREAT BLD-MCNC: 1.97 MG/DL (ref 0.57–1)
CREAT BLD-MCNC: 2.12 MG/DL (ref 0.57–1)
CREAT BLD-MCNC: 2.31 MG/DL (ref 0.57–1)
CREAT BLD-MCNC: 3.16 MG/DL (ref 0.57–1)
CREAT SERPL-MCNC: 1.13 MG/DL (ref 0.57–1)
CREAT UR-MCNC: 41.1 MG/DL
D-LACTATE SERPL-SCNC: 10.7 MMOL/L (ref 0.5–2)
DEPRECATED RDW RBC AUTO: 58.7 FL (ref 37–54)
DEPRECATED RDW RBC AUTO: 59.5 FL (ref 37–54)
DEPRECATED RDW RBC AUTO: 60.8 FL (ref 37–54)
DEPRECATED RDW RBC AUTO: 61.5 FL (ref 37–54)
DEPRECATED RDW RBC AUTO: 74.8 FL (ref 37–54)
DEPRECATED RDW RBC AUTO: 75.2 FL (ref 37–54)
DEPRECATED RDW RBC AUTO: 75.7 FL (ref 37–54)
DEPRECATED RDW RBC AUTO: 83.1 FL (ref 37–54)
DEPRECATED RDW RBC AUTO: 84 FL (ref 37–54)
EOSINOPHIL # BLD AUTO: 0.12 10*3/MM3 (ref 0–0.4)
EOSINOPHIL # BLD AUTO: 0.15 10*3/MM3 (ref 0–0.4)
EOSINOPHIL # BLD AUTO: 0.15 10*3/MM3 (ref 0–0.4)
EOSINOPHIL # BLD AUTO: 0.18 10*3/MM3 (ref 0–0.4)
EOSINOPHIL # BLD AUTO: 0.2 10*3/MM3 (ref 0–0.4)
EOSINOPHIL # BLD AUTO: 0.33 10*3/MM3 (ref 0–0.4)
EOSINOPHIL # BLD AUTO: 0.33 10*3/MM3 (ref 0–0.4)
EOSINOPHIL NFR BLD AUTO: 0.8 % (ref 0.3–6.2)
EOSINOPHIL NFR BLD AUTO: 0.9 % (ref 0.3–6.2)
EOSINOPHIL NFR BLD AUTO: 1 % (ref 0.3–6.2)
EOSINOPHIL NFR BLD AUTO: 1.1 % (ref 0.3–6.2)
EOSINOPHIL NFR BLD AUTO: 1.2 % (ref 0.3–6.2)
EOSINOPHIL NFR BLD AUTO: 2 % (ref 0.3–6.2)
EOSINOPHIL NFR BLD AUTO: 2 % (ref 0.3–6.2)
EOSINOPHIL SPEC QL MICRO: 0 % EOS/100 CELLS (ref 0–0)
ERYTHROCYTE [DISTWIDTH] IN BLOOD BY AUTOMATED COUNT: 16.3 % (ref 12.3–15.4)
ERYTHROCYTE [DISTWIDTH] IN BLOOD BY AUTOMATED COUNT: 16.6 % (ref 12.3–15.4)
ERYTHROCYTE [DISTWIDTH] IN BLOOD BY AUTOMATED COUNT: 17 % (ref 12.3–15.4)
ERYTHROCYTE [DISTWIDTH] IN BLOOD BY AUTOMATED COUNT: 17 % (ref 12.3–15.4)
ERYTHROCYTE [DISTWIDTH] IN BLOOD BY AUTOMATED COUNT: 21.7 % (ref 12.3–15.4)
ERYTHROCYTE [DISTWIDTH] IN BLOOD BY AUTOMATED COUNT: 21.9 % (ref 12.3–15.4)
ERYTHROCYTE [DISTWIDTH] IN BLOOD BY AUTOMATED COUNT: 22 % (ref 12.3–15.4)
ERYTHROCYTE [DISTWIDTH] IN BLOOD BY AUTOMATED COUNT: 22.5 % (ref 12.3–15.4)
ERYTHROCYTE [DISTWIDTH] IN BLOOD BY AUTOMATED COUNT: 22.7 % (ref 12.3–15.4)
FERRITIN SERPL-MCNC: 1766 NG/ML (ref 13–150)
FERRITIN SERPL-MCNC: 355.3 NG/ML (ref 13–150)
FERRITIN SERPL-MCNC: 634.7 NG/ML (ref 13–150)
FLUAV H1 2009 PAND RNA NPH QL NAA+PROBE: NOT DETECTED
FLUAV H1 HA GENE NPH QL NAA+PROBE: NOT DETECTED
FLUAV H3 RNA NPH QL NAA+PROBE: NOT DETECTED
FLUAV SUBTYP SPEC NAA+PROBE: NOT DETECTED
FLUBV RNA ISLT QL NAA+PROBE: NOT DETECTED
FOLATE SERPL-MCNC: 12.6 NG/ML (ref 4.78–24.2)
GAS FLOW AIRWAY: 4 LPM
GFR SERPL CREATININE-BSD FRML MDRD: 14 ML/MIN/1.73
GFR SERPL CREATININE-BSD FRML MDRD: 20 ML/MIN/1.73
GFR SERPL CREATININE-BSD FRML MDRD: 22 ML/MIN/1.73
GFR SERPL CREATININE-BSD FRML MDRD: 24 ML/MIN/1.73
GFR SERPL CREATININE-BSD FRML MDRD: 25 ML/MIN/1.73
GFR SERPL CREATININE-BSD FRML MDRD: 51 ML/MIN/1.73
GFR SERPL CREATININE-BSD FRML MDRD: ABNORMAL ML/MIN/1.73
GLOBULIN SER CALC-MCNC: 2.5 GM/DL
GLOBULIN UR ELPH-MCNC: 2.3 GM/DL
GLOBULIN UR ELPH-MCNC: 2.7 GM/DL
GLOBULIN UR ELPH-MCNC: 2.8 GM/DL
GLOBULIN UR ELPH-MCNC: 2.9 GM/DL
GLOBULIN UR ELPH-MCNC: 3.3 GM/DL
GLUCOSE BLD-MCNC: 101 MG/DL (ref 65–99)
GLUCOSE BLD-MCNC: 118 MG/DL (ref 65–99)
GLUCOSE BLD-MCNC: 174 MG/DL (ref 65–99)
GLUCOSE BLD-MCNC: 52 MG/DL (ref 65–99)
GLUCOSE BLD-MCNC: 71 MG/DL (ref 65–99)
GLUCOSE BLD-MCNC: 76 MG/DL (ref 65–99)
GLUCOSE BLDC GLUCOMTR-MCNC: 104 MG/DL (ref 70–130)
GLUCOSE BLDC GLUCOMTR-MCNC: 112 MG/DL (ref 70–130)
GLUCOSE BLDC GLUCOMTR-MCNC: 123 MG/DL (ref 70–130)
GLUCOSE BLDC GLUCOMTR-MCNC: 123 MG/DL (ref 70–130)
GLUCOSE BLDC GLUCOMTR-MCNC: 126 MG/DL (ref 70–130)
GLUCOSE BLDC GLUCOMTR-MCNC: 167 MG/DL (ref 70–130)
GLUCOSE BLDC GLUCOMTR-MCNC: 51 MG/DL (ref 70–130)
GLUCOSE BLDC GLUCOMTR-MCNC: 54 MG/DL (ref 70–130)
GLUCOSE BLDC GLUCOMTR-MCNC: 58 MG/DL (ref 70–130)
GLUCOSE BLDC GLUCOMTR-MCNC: 59 MG/DL (ref 70–130)
GLUCOSE BLDC GLUCOMTR-MCNC: 60 MG/DL (ref 70–130)
GLUCOSE BLDC GLUCOMTR-MCNC: 61 MG/DL (ref 70–130)
GLUCOSE BLDC GLUCOMTR-MCNC: 66 MG/DL (ref 70–130)
GLUCOSE BLDC GLUCOMTR-MCNC: 67 MG/DL (ref 70–130)
GLUCOSE BLDC GLUCOMTR-MCNC: 68 MG/DL (ref 70–130)
GLUCOSE BLDC GLUCOMTR-MCNC: 71 MG/DL (ref 70–130)
GLUCOSE BLDC GLUCOMTR-MCNC: 71 MG/DL (ref 70–130)
GLUCOSE BLDC GLUCOMTR-MCNC: 72 MG/DL (ref 70–130)
GLUCOSE BLDC GLUCOMTR-MCNC: 73 MG/DL (ref 70–130)
GLUCOSE BLDC GLUCOMTR-MCNC: 75 MG/DL (ref 70–130)
GLUCOSE BLDC GLUCOMTR-MCNC: 76 MG/DL (ref 70–130)
GLUCOSE BLDC GLUCOMTR-MCNC: 79 MG/DL (ref 70–130)
GLUCOSE BLDC GLUCOMTR-MCNC: 80 MG/DL (ref 70–130)
GLUCOSE BLDC GLUCOMTR-MCNC: 81 MG/DL (ref 70–130)
GLUCOSE BLDC GLUCOMTR-MCNC: 82 MG/DL (ref 70–130)
GLUCOSE BLDC GLUCOMTR-MCNC: 83 MG/DL (ref 70–130)
GLUCOSE BLDC GLUCOMTR-MCNC: 83 MG/DL (ref 70–130)
GLUCOSE BLDC GLUCOMTR-MCNC: 84 MG/DL (ref 70–130)
GLUCOSE BLDC GLUCOMTR-MCNC: 87 MG/DL (ref 70–130)
GLUCOSE BLDC GLUCOMTR-MCNC: 87 MG/DL (ref 70–130)
GLUCOSE BLDC GLUCOMTR-MCNC: 91 MG/DL (ref 70–130)
GLUCOSE BLDC GLUCOMTR-MCNC: 98 MG/DL (ref 70–130)
GLUCOSE BLDC GLUCOMTR-MCNC: 99 MG/DL (ref 70–130)
GLUCOSE SERPL-MCNC: 116 MG/DL (ref 65–99)
GLUCOSE UR STRIP-MCNC: NEGATIVE MG/DL
HADV DNA SPEC NAA+PROBE: NOT DETECTED
HBA1C MFR BLD: 5.4 % (ref 4.8–5.6)
HBA1C MFR BLD: 7.6 % (ref 4.8–5.6)
HCO3 BLDA-SCNC: 17 MMOL/L (ref 22–28)
HCO3 BLDA-SCNC: 17.6 MMOL/L (ref 22–28)
HCOV 229E RNA SPEC QL NAA+PROBE: NOT DETECTED
HCOV HKU1 RNA SPEC QL NAA+PROBE: NOT DETECTED
HCOV NL63 RNA SPEC QL NAA+PROBE: NOT DETECTED
HCOV OC43 RNA SPEC QL NAA+PROBE: NOT DETECTED
HCT VFR BLD AUTO: 28.7 % (ref 34–46.6)
HCT VFR BLD AUTO: 31.4 % (ref 34–46.6)
HCT VFR BLD AUTO: 31.5 % (ref 34–46.6)
HCT VFR BLD AUTO: 31.7 % (ref 34–46.6)
HCT VFR BLD AUTO: 32.8 % (ref 34–46.6)
HCT VFR BLD AUTO: 33.1 % (ref 34–46.6)
HCT VFR BLD AUTO: 33.3 % (ref 34–46.6)
HCT VFR BLD AUTO: 35 % (ref 34–46.6)
HCT VFR BLD AUTO: 35.2 % (ref 34–46.6)
HDLC SERPL-MCNC: 29 MG/DL (ref 40–60)
HDLC SERPL-MCNC: 55 MG/DL (ref 40–60)
HGB BLD-MCNC: 10 G/DL (ref 12–15.9)
HGB BLD-MCNC: 10 G/DL (ref 12–15.9)
HGB BLD-MCNC: 10.1 G/DL (ref 12–15.9)
HGB BLD-MCNC: 10.3 G/DL (ref 12–15.9)
HGB BLD-MCNC: 10.6 G/DL (ref 12–15.9)
HGB BLD-MCNC: 8.2 G/DL (ref 12–15.9)
HGB BLD-MCNC: 9.4 G/DL (ref 12–15.9)
HGB BLD-MCNC: 9.9 G/DL (ref 12–15.9)
HGB BLD-MCNC: 9.9 G/DL (ref 12–15.9)
HGB RETIC QN AUTO: 31.2 PG (ref 29.8–36.1)
HGB UR QL STRIP.AUTO: NEGATIVE
HMPV RNA NPH QL NAA+NON-PROBE: NOT DETECTED
HOLD SPECIMEN: NORMAL
HOROWITZ INDEX BLD+IHG-RTO: 80 %
HPIV1 RNA SPEC QL NAA+PROBE: NOT DETECTED
HPIV2 RNA SPEC QL NAA+PROBE: NOT DETECTED
HPIV3 RNA NPH QL NAA+PROBE: NOT DETECTED
HPIV4 P GENE NPH QL NAA+PROBE: NOT DETECTED
HYALINE CASTS UR QL AUTO: ABNORMAL /LPF
IMM GRANULOCYTES # BLD AUTO: 0.05 10*3/MM3 (ref 0–0.05)
IMM GRANULOCYTES # BLD AUTO: 0.11 10*3/MM3 (ref 0–0.05)
IMM GRANULOCYTES # BLD AUTO: 0.11 10*3/MM3 (ref 0–0.05)
IMM GRANULOCYTES # BLD AUTO: 0.13 10*3/MM3 (ref 0–0.05)
IMM GRANULOCYTES # BLD AUTO: 0.15 10*3/MM3 (ref 0–0.05)
IMM GRANULOCYTES # BLD AUTO: 0.17 10*3/MM3 (ref 0–0.05)
IMM GRANULOCYTES # BLD AUTO: 0.2 10*3/MM3 (ref 0–0.05)
IMM GRANULOCYTES NFR BLD AUTO: 0.4 % (ref 0–0.5)
IMM GRANULOCYTES NFR BLD AUTO: 0.7 % (ref 0–0.5)
IMM GRANULOCYTES NFR BLD AUTO: 0.7 % (ref 0–0.5)
IMM GRANULOCYTES NFR BLD AUTO: 0.8 % (ref 0–0.5)
IMM GRANULOCYTES NFR BLD AUTO: 0.9 % (ref 0–0.5)
IMM GRANULOCYTES NFR BLD AUTO: 1 % (ref 0–0.5)
IMM GRANULOCYTES NFR BLD AUTO: 1.1 % (ref 0–0.5)
IMM RETICS NFR: 24.4 % (ref 3–15.8)
INR PPP: 2.8 (ref 0.9–1.1)
INTERPRETATION: NORMAL
IRON 24H UR-MRATE: 33 MCG/DL (ref 37–145)
IRON 24H UR-MRATE: 38 MCG/DL (ref 37–145)
IRON 24H UR-MRATE: 43 MCG/DL (ref 37–145)
IRON SATN MFR SERPL: 17 % (ref 14–48)
IRON SATN MFR SERPL: 18 % (ref 14–48)
IRON SATN MFR SERPL: 26 % (ref 20–50)
KETONES UR QL STRIP: NEGATIVE
LDLC SERPL CALC-MCNC: 14 MG/DL (ref 0–100)
LDLC SERPL CALC-MCNC: 29 MG/DL (ref 0–100)
LDLC/HDLC SERPL: 0.48 {RATIO}
LEFT ATRIUM VOLUME INDEX: 21 ML/M2
LEUKOCYTE ESTERASE UR QL STRIP.AUTO: ABNORMAL
LYMPHOCYTES # BLD AUTO: 0.72 10*3/MM3 (ref 0.7–3.1)
LYMPHOCYTES # BLD AUTO: 0.73 10*3/MM3 (ref 0.7–3.1)
LYMPHOCYTES # BLD AUTO: 0.74 10*3/MM3 (ref 0.7–3.1)
LYMPHOCYTES # BLD AUTO: 1.05 10*3/MM3 (ref 0.7–3.1)
LYMPHOCYTES # BLD AUTO: 1.22 10*3/MM3 (ref 0.7–3.1)
LYMPHOCYTES # BLD AUTO: 1.32 10*3/MM3 (ref 0.7–3.1)
LYMPHOCYTES # BLD AUTO: 1.39 10*3/MM3 (ref 0.7–3.1)
LYMPHOCYTES # BLD MANUAL: 0.26 10*3/MM3 (ref 0.7–3.1)
LYMPHOCYTES # BLD MANUAL: 0.72 10*3/MM3 (ref 0.7–3.1)
LYMPHOCYTES NFR BLD AUTO: 4.5 % (ref 19.6–45.3)
LYMPHOCYTES NFR BLD AUTO: 5.2 % (ref 19.6–45.3)
LYMPHOCYTES NFR BLD AUTO: 5.8 % (ref 19.6–45.3)
LYMPHOCYTES NFR BLD AUTO: 6 % (ref 19.6–45.3)
LYMPHOCYTES NFR BLD AUTO: 7.3 % (ref 19.6–45.3)
LYMPHOCYTES NFR BLD AUTO: 7.9 % (ref 19.6–45.3)
LYMPHOCYTES NFR BLD AUTO: 7.9 % (ref 19.6–45.3)
LYMPHOCYTES NFR BLD MANUAL: 2 % (ref 19.6–45.3)
LYMPHOCYTES NFR BLD MANUAL: 2 % (ref 5–12)
LYMPHOCYTES NFR BLD MANUAL: 4.1 % (ref 5–12)
LYMPHOCYTES NFR BLD MANUAL: 5 % (ref 19.6–45.3)
Lab: NORMAL
M PNEUMO IGG SER IA-ACNC: NOT DETECTED
MACROCYTES BLD QL SMEAR: ABNORMAL
MACROCYTES BLD QL SMEAR: ABNORMAL
MAGNESIUM SERPL-MCNC: 2.4 MG/DL (ref 1.6–2.4)
MAXIMAL PREDICTED HEART RATE: 140 BPM
MCH RBC QN AUTO: 29.6 PG (ref 26.6–33)
MCH RBC QN AUTO: 29.8 PG (ref 26.6–33)
MCH RBC QN AUTO: 29.9 PG (ref 26.6–33)
MCH RBC QN AUTO: 30.1 PG (ref 26.6–33)
MCH RBC QN AUTO: 30.2 PG (ref 26.6–33)
MCH RBC QN AUTO: 30.3 PG (ref 26.6–33)
MCH RBC QN AUTO: 30.3 PG (ref 26.6–33)
MCH RBC QN AUTO: 30.8 PG (ref 26.6–33)
MCH RBC QN AUTO: 31.1 PG (ref 26.6–33)
MCHC RBC AUTO-ENTMCNC: 28.6 G/DL (ref 31.5–35.7)
MCHC RBC AUTO-ENTMCNC: 29.4 G/DL (ref 31.5–35.7)
MCHC RBC AUTO-ENTMCNC: 29.8 G/DL (ref 31.5–35.7)
MCHC RBC AUTO-ENTMCNC: 29.9 G/DL (ref 31.5–35.7)
MCHC RBC AUTO-ENTMCNC: 30 G/DL (ref 31.5–35.7)
MCHC RBC AUTO-ENTMCNC: 30.1 G/DL (ref 31.5–35.7)
MCHC RBC AUTO-ENTMCNC: 30.8 G/DL (ref 31.5–35.7)
MCHC RBC AUTO-ENTMCNC: 31.2 G/DL (ref 31.5–35.7)
MCHC RBC AUTO-ENTMCNC: 31.8 G/DL (ref 31.5–35.7)
MCV RBC AUTO: 100 FL (ref 79–97)
MCV RBC AUTO: 100.3 FL (ref 79–97)
MCV RBC AUTO: 100.6 FL (ref 79–97)
MCV RBC AUTO: 102.9 FL (ref 79–97)
MCV RBC AUTO: 105.9 FL (ref 79–97)
MCV RBC AUTO: 94.6 FL (ref 79–97)
MCV RBC AUTO: 98.3 FL (ref 79–97)
MCV RBC AUTO: 99.7 FL (ref 79–97)
MCV RBC AUTO: 99.7 FL (ref 79–97)
METAMYELOCYTES NFR BLD MANUAL: 1 % (ref 0–0)
MODALITY: ABNORMAL
MODALITY: ABNORMAL
MONOCYTES # BLD AUTO: 0.29 10*3/MM3 (ref 0.1–0.9)
MONOCYTES # BLD AUTO: 0.53 10*3/MM3 (ref 0.1–0.9)
MONOCYTES # BLD AUTO: 0.77 10*3/MM3 (ref 0.1–0.9)
MONOCYTES # BLD AUTO: 0.82 10*3/MM3 (ref 0.1–0.9)
MONOCYTES # BLD AUTO: 0.93 10*3/MM3 (ref 0.1–0.9)
MONOCYTES # BLD AUTO: 1.2 10*3/MM3 (ref 0.1–0.9)
MONOCYTES # BLD AUTO: 1.22 10*3/MM3 (ref 0.1–0.9)
MONOCYTES # BLD AUTO: 1.26 10*3/MM3 (ref 0.1–0.9)
MONOCYTES # BLD AUTO: 1.32 10*3/MM3 (ref 0.1–0.9)
MONOCYTES NFR BLD AUTO: 5.8 % (ref 5–12)
MONOCYTES NFR BLD AUTO: 5.8 % (ref 5–12)
MONOCYTES NFR BLD AUTO: 6.2 % (ref 5–12)
MONOCYTES NFR BLD AUTO: 6.9 % (ref 5–12)
MONOCYTES NFR BLD AUTO: 7.2 % (ref 5–12)
MONOCYTES NFR BLD AUTO: 7.5 % (ref 5–12)
MONOCYTES NFR BLD AUTO: 7.6 % (ref 5–12)
MYELOCYTES NFR BLD MANUAL: 1 % (ref 0–0)
NEUTROPHILS # BLD AUTO: 10.71 10*3/MM3 (ref 1.7–7)
NEUTROPHILS # BLD AUTO: 12.07 10*3/MM3 (ref 1.7–7)
NEUTROPHILS # BLD AUTO: 12.34 10*3/MM3 (ref 1.7–7)
NEUTROPHILS # BLD AUTO: 13.22 10*3/MM3 (ref 1.7–7)
NEUTROPHILS # BLD AUTO: 13.54 10*3/MM3 (ref 1.7–7)
NEUTROPHILS # BLD AUTO: 13.68 10*3/MM3 (ref 1.7–7)
NEUTROPHILS # BLD AUTO: 14.18 10*3/MM3 (ref 1.7–7)
NEUTROPHILS # BLD AUTO: 14.45 10*3/MM3 (ref 1.7–7)
NEUTROPHILS # BLD AUTO: 14.93 10*3/MM3 (ref 1.7–7)
NEUTROPHILS NFR BLD AUTO: 81.3 % (ref 42.7–76)
NEUTROPHILS NFR BLD AUTO: 82.2 % (ref 42.7–76)
NEUTROPHILS NFR BLD AUTO: 82.2 % (ref 42.7–76)
NEUTROPHILS NFR BLD AUTO: 85.1 % (ref 42.7–76)
NEUTROPHILS NFR BLD AUTO: 85.9 % (ref 42.7–76)
NEUTROPHILS NFR BLD AUTO: 87 % (ref 42.7–76)
NEUTROPHILS NFR BLD AUTO: 87.8 % (ref 42.7–76)
NEUTROPHILS NFR BLD MANUAL: 92 % (ref 42.7–76)
NEUTROPHILS NFR BLD MANUAL: 92.9 % (ref 42.7–76)
NITRITE UR QL STRIP: NEGATIVE
NRBC BLD AUTO-RTO: 0 /100 WBC (ref 0–0.2)
NRBC BLD AUTO-RTO: 0.1 /100 WBC (ref 0–0.2)
NRBC BLD AUTO-RTO: 0.1 /100 WBC (ref 0–0.2)
NRBC SPEC MANUAL: 1 /100 WBC (ref 0–0.2)
NRBC SPEC MANUAL: 1 /100 WBC (ref 0–0.2)
NT-PROBNP SERPL-MCNC: 5053 PG/ML (ref 5–1800)
NT-PROBNP SERPL-MCNC: 9422 PG/ML (ref 5–1800)
NT-PROBNP SERPL-MCNC: 9889 PG/ML (ref 5–1800)
O2 A-A PPRESDIFF RESPIRATORY: 0.5 MMHG
PCO2 BLDA: 45.1 MM HG (ref 35–45)
PCO2 BLDA: 69.4 MM HG (ref 35–45)
PEEP RESPIRATORY: 5 CM[H2O]
PH BLDA: 7.01 PH UNITS (ref 7.35–7.45)
PH BLDA: 7.18 PH UNITS (ref 7.35–7.45)
PH UR STRIP.AUTO: <=5 [PH] (ref 5–8)
PHOSPHATE SERPL-MCNC: 4.3 MG/DL (ref 2.5–4.5)
PHOSPHATE SERPL-MCNC: 6 MG/DL (ref 2.5–4.5)
PHOSPHATE SERPL-MCNC: 8.7 MG/DL (ref 2.5–4.5)
PLAT MORPH BLD: NORMAL
PLAT MORPH BLD: NORMAL
PLATELET # BLD AUTO: 128 10*3/MM3 (ref 140–450)
PLATELET # BLD AUTO: 128 10*3/MM3 (ref 140–450)
PLATELET # BLD AUTO: 134 10*3/MM3 (ref 140–450)
PLATELET # BLD AUTO: 134 10*3/MM3 (ref 140–450)
PLATELET # BLD AUTO: 135 10*3/MM3 (ref 140–450)
PLATELET # BLD AUTO: 156 10*3/MM3 (ref 140–450)
PLATELET # BLD AUTO: 160 10*3/MM3 (ref 140–450)
PLATELET # BLD AUTO: 177 10*3/MM3 (ref 140–450)
PLATELET # BLD AUTO: 228 10*3/MM3 (ref 140–450)
PMV BLD AUTO: 10.7 FL (ref 6–12)
PMV BLD AUTO: 11.1 FL (ref 6–12)
PMV BLD AUTO: 11.5 FL (ref 6–12)
PMV BLD AUTO: 11.8 FL (ref 6–12)
PMV BLD AUTO: 12.7 FL (ref 6–12)
PMV BLD AUTO: 13.4 FL (ref 6–12)
PMV BLD AUTO: 13.8 FL (ref 6–12)
PMV BLD AUTO: 14.1 FL (ref 6–12)
PMV BLD AUTO: 14.6 FL (ref 6–12)
PO2 BLDA: 261.7 MM HG (ref 80–100)
PO2 BLDA: 76.7 MM HG (ref 80–100)
POIKILOCYTOSIS BLD QL SMEAR: ABNORMAL
POLYCHROMASIA BLD QL SMEAR: ABNORMAL
POTASSIUM BLD-SCNC: 4 MMOL/L (ref 3.5–5.2)
POTASSIUM BLD-SCNC: 4.7 MMOL/L (ref 3.5–5.2)
POTASSIUM BLD-SCNC: 4.8 MMOL/L (ref 3.5–5.2)
POTASSIUM BLD-SCNC: 4.9 MMOL/L (ref 3.5–5.2)
POTASSIUM BLD-SCNC: 6 MMOL/L (ref 3.5–5.2)
POTASSIUM BLD-SCNC: 6 MMOL/L (ref 3.5–5.2)
POTASSIUM BLD-SCNC: 6.6 MMOL/L (ref 3.5–5.2)
POTASSIUM SERPL-SCNC: 5 MMOL/L (ref 3.5–5.2)
PROCALCITONIN SERPL-MCNC: 3.2 NG/ML (ref 0.1–0.25)
PROT SERPL-MCNC: 4.1 G/DL (ref 6–8.5)
PROT SERPL-MCNC: 5 G/DL (ref 6–8.5)
PROT SERPL-MCNC: 5.1 G/DL (ref 6–8.5)
PROT SERPL-MCNC: 5.1 G/DL (ref 6–8.5)
PROT SERPL-MCNC: 6.3 G/DL (ref 6–8.5)
PROT SERPL-MCNC: 7.1 G/DL (ref 6–8.5)
PROT UR QL STRIP: NEGATIVE
PROT UR-MCNC: 6 MG/DL
PROTHROMBIN TIME: 29.2 SECONDS (ref 11.7–14.2)
RBC # BLD AUTO: 2.71 10*6/MM3 (ref 3.77–5.28)
RBC # BLD AUTO: 3.14 10*6/MM3 (ref 3.77–5.28)
RBC # BLD AUTO: 3.18 10*6/MM3 (ref 3.77–5.28)
RBC # BLD AUTO: 3.28 10*6/MM3 (ref 3.77–5.28)
RBC # BLD AUTO: 3.31 10*6/MM3 (ref 3.77–5.28)
RBC # BLD AUTO: 3.32 10*6/MM3 (ref 3.77–5.28)
RBC # BLD AUTO: 3.32 10*6/MM3 (ref 3.77–5.28)
RBC # BLD AUTO: 3.4 10*6/MM3 (ref 3.77–5.28)
RBC # BLD AUTO: 3.58 10*6/MM3 (ref 3.77–5.28)
RBC # UR: ABNORMAL /HPF
REF LAB TEST METHOD: ABNORMAL
RETICS/RBC NFR AUTO: 2.19 % (ref 0.7–1.9)
RH BLD: POSITIVE
RHINOVIRUS RNA SPEC NAA+PROBE: NOT DETECTED
RSV RNA NPH QL NAA+NON-PROBE: NOT DETECTED
SAO2 % BLDCOA: 85.7 % (ref 92–99)
SAO2 % BLDCOA: 99.8 % (ref 92–99)
SET MECH RESP RATE: 16
SMUDGE CELLS BLD QL SMEAR: ABNORMAL
SODIUM BLD-SCNC: 138 MMOL/L (ref 136–145)
SODIUM BLD-SCNC: 139 MMOL/L (ref 136–145)
SODIUM BLD-SCNC: 141 MMOL/L (ref 136–145)
SODIUM BLD-SCNC: 142 MMOL/L (ref 136–145)
SODIUM BLD-SCNC: 143 MMOL/L (ref 136–145)
SODIUM BLD-SCNC: 144 MMOL/L (ref 136–145)
SODIUM SERPL-SCNC: 141 MMOL/L (ref 136–145)
SODIUM UR-SCNC: 27 MMOL/L
SP GR UR STRIP: 1.02 (ref 1–1.03)
SPHEROCYTES BLD QL SMEAR: ABNORMAL
SQUAMOUS #/AREA URNS HPF: ABNORMAL /HPF
STRESS TARGET HR: 119 BPM
T&S EXPIRATION DATE: NORMAL
TIBC SERPL-MCNC: 127 MCG/DL (ref 298–536)
TIBC SERPL-MCNC: 230 MCG/DL (ref 249–505)
TIBC SERPL-MCNC: 242 MCG/DL (ref 249–505)
TOTAL RATE: 21 BREATHS/MINUTE
TOTAL RATE: 28 BREATHS/MINUTE
TRANSFERRIN SERPL-MCNC: 164 MG/DL (ref 200–360)
TRANSFERRIN SERPL-MCNC: 173 MG/DL (ref 200–360)
TRANSFERRIN SERPL-MCNC: 85 MG/DL (ref 200–360)
TRIGL SERPL-MCNC: 146 MG/DL (ref 0–150)
TRIGL SERPL-MCNC: 89 MG/DL (ref 0–150)
TROPONIN T SERPL-MCNC: 0.01 NG/ML (ref 0–0.03)
TROPONIN T SERPL-MCNC: 0.01 NG/ML (ref 0–0.03)
TSH SERPL DL<=0.05 MIU/L-ACNC: 2.89 MIU/ML (ref 0.27–4.2)
TSH SERPL DL<=0.05 MIU/L-ACNC: 3.31 MIU/ML (ref 0.27–4.2)
URATE SERPL-MCNC: 7.5 MG/DL (ref 2.4–5.7)
URATE SERPL-MCNC: 7.7 MG/DL (ref 2.4–5.7)
UROBILINOGEN UR QL STRIP: ABNORMAL
UUN 24H UR-MCNC: 454 MG/DL
VENTILATOR MODE: AC
VLDLC SERPL CALC-MCNC: 17.8 MG/DL
VLDLC SERPL-MCNC: 29.2 MG/DL (ref 5–40)
VT ON VENT VENT: 500 ML
WBC MORPH BLD: NORMAL
WBC NRBC COR # BLD: 12.46 10*3/MM3 (ref 3.4–10.8)
WBC NRBC COR # BLD: 12.99 10*3/MM3 (ref 3.4–10.8)
WBC NRBC COR # BLD: 14.18 10*3/MM3 (ref 3.4–10.8)
WBC NRBC COR # BLD: 14.37 10*3/MM3 (ref 3.4–10.8)
WBC NRBC COR # BLD: 16.15 10*3/MM3 (ref 3.4–10.8)
WBC NRBC COR # BLD: 16.64 10*3/MM3 (ref 3.4–10.8)
WBC NRBC COR # BLD: 16.66 10*3/MM3 (ref 3.4–10.8)
WBC NRBC COR # BLD: 17.57 10*3/MM3 (ref 3.4–10.8)
WBC NRBC COR # BLD: 17.6 10*3/MM3 (ref 3.4–10.8)
WBC UR QL AUTO: ABNORMAL /HPF

## 2019-01-01 PROCEDURE — 80053 COMPREHEN METABOLIC PANEL: CPT | Performed by: HOSPITALIST

## 2019-01-01 PROCEDURE — 83880 ASSAY OF NATRIURETIC PEPTIDE: CPT | Performed by: HOSPITALIST

## 2019-01-01 PROCEDURE — 82803 BLOOD GASES ANY COMBINATION: CPT

## 2019-01-01 PROCEDURE — 99215 OFFICE O/P EST HI 40 MIN: CPT | Performed by: INTERNAL MEDICINE

## 2019-01-01 PROCEDURE — 82962 GLUCOSE BLOOD TEST: CPT

## 2019-01-01 PROCEDURE — 97165 OT EVAL LOW COMPLEX 30 MIN: CPT

## 2019-01-01 PROCEDURE — 99232 SBSQ HOSP IP/OBS MODERATE 35: CPT | Performed by: NURSE PRACTITIONER

## 2019-01-01 PROCEDURE — 25010000003 CEFAZOLIN 1-4 GM/50ML-% SOLUTION: Performed by: HOSPITALIST

## 2019-01-01 PROCEDURE — 83540 ASSAY OF IRON: CPT | Performed by: INTERNAL MEDICINE

## 2019-01-01 PROCEDURE — 99222 1ST HOSP IP/OBS MODERATE 55: CPT | Performed by: INTERNAL MEDICINE

## 2019-01-01 PROCEDURE — 85025 COMPLETE CBC W/AUTO DIFF WBC: CPT | Performed by: INTERNAL MEDICINE

## 2019-01-01 PROCEDURE — 99214 OFFICE O/P EST MOD 30 MIN: CPT | Performed by: NURSE PRACTITIONER

## 2019-01-01 PROCEDURE — 94799 UNLISTED PULMONARY SVC/PX: CPT

## 2019-01-01 PROCEDURE — 93306 TTE W/DOPPLER COMPLETE: CPT | Performed by: INTERNAL MEDICINE

## 2019-01-01 PROCEDURE — 36600 WITHDRAWAL OF ARTERIAL BLOOD: CPT

## 2019-01-01 PROCEDURE — P9017 PLASMA 1 DONOR FRZ W/IN 8 HR: HCPCS

## 2019-01-01 PROCEDURE — 80069 RENAL FUNCTION PANEL: CPT | Performed by: INTERNAL MEDICINE

## 2019-01-01 PROCEDURE — 36415 COLL VENOUS BLD VENIPUNCTURE: CPT | Performed by: INTERNAL MEDICINE

## 2019-01-01 PROCEDURE — 93970 EXTREMITY STUDY: CPT

## 2019-01-01 PROCEDURE — 02HV33Z INSERTION OF INFUSION DEVICE INTO SUPERIOR VENA CAVA, PERCUTANEOUS APPROACH: ICD-10-PCS | Performed by: INTERNAL MEDICINE

## 2019-01-01 PROCEDURE — 86927 PLASMA FRESH FROZEN: CPT

## 2019-01-01 PROCEDURE — 25010000002 ONDANSETRON PER 1 MG: Performed by: HOSPITALIST

## 2019-01-01 PROCEDURE — 63710000001 INSULIN LISPRO (HUMAN) PER 5 UNITS: Performed by: HOSPITALIST

## 2019-01-01 PROCEDURE — 5A1935Z RESPIRATORY VENTILATION, LESS THAN 24 CONSECUTIVE HOURS: ICD-10-PCS | Performed by: INTERNAL MEDICINE

## 2019-01-01 PROCEDURE — 85046 RETICYTE/HGB CONCENTRATE: CPT | Performed by: INTERNAL MEDICINE

## 2019-01-01 PROCEDURE — 84443 ASSAY THYROID STIM HORMONE: CPT | Performed by: INTERNAL MEDICINE

## 2019-01-01 PROCEDURE — 25010000002 CALCIUM GLUCONATE PER 10 ML: Performed by: INTERNAL MEDICINE

## 2019-01-01 PROCEDURE — 84540 ASSAY OF URINE/UREA-N: CPT | Performed by: INTERNAL MEDICINE

## 2019-01-01 PROCEDURE — 84145 PROCALCITONIN (PCT): CPT | Performed by: INTERNAL MEDICINE

## 2019-01-01 PROCEDURE — 93010 ELECTROCARDIOGRAM REPORT: CPT | Performed by: INTERNAL MEDICINE

## 2019-01-01 PROCEDURE — 87040 BLOOD CULTURE FOR BACTERIA: CPT | Performed by: HOSPITALIST

## 2019-01-01 PROCEDURE — 85025 COMPLETE CBC W/AUTO DIFF WBC: CPT | Performed by: NURSE PRACTITIONER

## 2019-01-01 PROCEDURE — 84300 ASSAY OF URINE SODIUM: CPT | Performed by: INTERNAL MEDICINE

## 2019-01-01 PROCEDURE — 80053 COMPREHEN METABOLIC PANEL: CPT | Performed by: INTERNAL MEDICINE

## 2019-01-01 PROCEDURE — 85007 BL SMEAR W/DIFF WBC COUNT: CPT | Performed by: HOSPITALIST

## 2019-01-01 PROCEDURE — 71045 X-RAY EXAM CHEST 1 VIEW: CPT

## 2019-01-01 PROCEDURE — 84156 ASSAY OF PROTEIN URINE: CPT | Performed by: INTERNAL MEDICINE

## 2019-01-01 PROCEDURE — 83880 ASSAY OF NATRIURETIC PEPTIDE: CPT | Performed by: NURSE PRACTITIONER

## 2019-01-01 PROCEDURE — 84100 ASSAY OF PHOSPHORUS: CPT | Performed by: INTERNAL MEDICINE

## 2019-01-01 PROCEDURE — 36430 TRANSFUSION BLD/BLD COMPNT: CPT

## 2019-01-01 PROCEDURE — 74176 CT ABD & PELVIS W/O CONTRAST: CPT

## 2019-01-01 PROCEDURE — 84550 ASSAY OF BLOOD/URIC ACID: CPT | Performed by: INTERNAL MEDICINE

## 2019-01-01 PROCEDURE — 94640 AIRWAY INHALATION TREATMENT: CPT

## 2019-01-01 PROCEDURE — 82728 ASSAY OF FERRITIN: CPT | Performed by: INTERNAL MEDICINE

## 2019-01-01 PROCEDURE — 84443 ASSAY THYROID STIM HORMONE: CPT | Performed by: HOSPITALIST

## 2019-01-01 PROCEDURE — 36415 COLL VENOUS BLD VENIPUNCTURE: CPT

## 2019-01-01 PROCEDURE — 84466 ASSAY OF TRANSFERRIN: CPT | Performed by: INTERNAL MEDICINE

## 2019-01-01 PROCEDURE — 85025 COMPLETE CBC W/AUTO DIFF WBC: CPT | Performed by: HOSPITALIST

## 2019-01-01 PROCEDURE — 71046 X-RAY EXAM CHEST 2 VIEWS: CPT

## 2019-01-01 PROCEDURE — 86900 BLOOD TYPING SEROLOGIC ABO: CPT | Performed by: HOSPITALIST

## 2019-01-01 PROCEDURE — 25010000002 FENTANYL CITRATE (PF) 100 MCG/2ML SOLUTION: Performed by: INTERNAL MEDICINE

## 2019-01-01 PROCEDURE — 82550 ASSAY OF CK (CPK): CPT | Performed by: INTERNAL MEDICINE

## 2019-01-01 PROCEDURE — 85730 THROMBOPLASTIN TIME PARTIAL: CPT | Performed by: HOSPITALIST

## 2019-01-01 PROCEDURE — 80061 LIPID PANEL: CPT | Performed by: HOSPITALIST

## 2019-01-01 PROCEDURE — 80053 COMPREHEN METABOLIC PANEL: CPT | Performed by: NURSE PRACTITIONER

## 2019-01-01 PROCEDURE — 93294 REM INTERROG EVL PM/LDLS PM: CPT | Performed by: INTERNAL MEDICINE

## 2019-01-01 PROCEDURE — 63710000001 INSULIN GLARGINE PER 5 UNITS: Performed by: HOSPITALIST

## 2019-01-01 PROCEDURE — 25010000002 PERFLUTREN (DEFINITY) 8.476 MG IN SODIUM CHLORIDE 0.9 % 10 ML INJECTION: Performed by: HOSPITALIST

## 2019-01-01 PROCEDURE — 97110 THERAPEUTIC EXERCISES: CPT

## 2019-01-01 PROCEDURE — 63710000001 INSULIN REGULAR HUMAN PER 5 UNITS: Performed by: INTERNAL MEDICINE

## 2019-01-01 PROCEDURE — 93296 REM INTERROG EVL PM/IDS: CPT | Performed by: INTERNAL MEDICINE

## 2019-01-01 PROCEDURE — 93005 ELECTROCARDIOGRAM TRACING: CPT | Performed by: INTERNAL MEDICINE

## 2019-01-01 PROCEDURE — 87205 SMEAR GRAM STAIN: CPT | Performed by: INTERNAL MEDICINE

## 2019-01-01 PROCEDURE — 99231 SBSQ HOSP IP/OBS SF/LOW 25: CPT | Performed by: INTERNAL MEDICINE

## 2019-01-01 PROCEDURE — 84484 ASSAY OF TROPONIN QUANT: CPT | Performed by: HOSPITALIST

## 2019-01-01 PROCEDURE — 83735 ASSAY OF MAGNESIUM: CPT | Performed by: INTERNAL MEDICINE

## 2019-01-01 PROCEDURE — 86901 BLOOD TYPING SEROLOGIC RH(D): CPT | Performed by: HOSPITALIST

## 2019-01-01 PROCEDURE — 93279 PRGRMG DEV EVAL PM/LDLS PM: CPT | Performed by: INTERNAL MEDICINE

## 2019-01-01 PROCEDURE — 83036 HEMOGLOBIN GLYCOSYLATED A1C: CPT | Performed by: HOSPITALIST

## 2019-01-01 PROCEDURE — 76775 US EXAM ABDO BACK WALL LIM: CPT

## 2019-01-01 PROCEDURE — 99221 1ST HOSP IP/OBS SF/LOW 40: CPT | Performed by: INTERNAL MEDICINE

## 2019-01-01 PROCEDURE — 94002 VENT MGMT INPAT INIT DAY: CPT

## 2019-01-01 PROCEDURE — 99214 OFFICE O/P EST MOD 30 MIN: CPT | Performed by: INTERNAL MEDICINE

## 2019-01-01 PROCEDURE — 82746 ASSAY OF FOLIC ACID SERUM: CPT | Performed by: INTERNAL MEDICINE

## 2019-01-01 PROCEDURE — 97535 SELF CARE MNGMENT TRAINING: CPT

## 2019-01-01 PROCEDURE — 86900 BLOOD TYPING SEROLOGIC ABO: CPT

## 2019-01-01 PROCEDURE — 96374 THER/PROPH/DIAG INJ IV PUSH: CPT

## 2019-01-01 PROCEDURE — 25010000002 MORPHINE PER 10 MG: Performed by: INTERNAL MEDICINE

## 2019-01-01 PROCEDURE — 82570 ASSAY OF URINE CREATININE: CPT | Performed by: INTERNAL MEDICINE

## 2019-01-01 PROCEDURE — 99215 OFFICE O/P EST HI 40 MIN: CPT | Performed by: NURSE PRACTITIONER

## 2019-01-01 PROCEDURE — 25010000002 EPOETIN ALFA PER 1000 UNITS: Performed by: INTERNAL MEDICINE

## 2019-01-01 PROCEDURE — 93306 TTE W/DOPPLER COMPLETE: CPT

## 2019-01-01 PROCEDURE — 97162 PT EVAL MOD COMPLEX 30 MIN: CPT

## 2019-01-01 PROCEDURE — 86923 COMPATIBILITY TEST ELECTRIC: CPT

## 2019-01-01 PROCEDURE — 84132 ASSAY OF SERUM POTASSIUM: CPT | Performed by: INTERNAL MEDICINE

## 2019-01-01 PROCEDURE — 83605 ASSAY OF LACTIC ACID: CPT | Performed by: INTERNAL MEDICINE

## 2019-01-01 PROCEDURE — 86850 RBC ANTIBODY SCREEN: CPT | Performed by: HOSPITALIST

## 2019-01-01 PROCEDURE — 0100U HC BIOFIRE FILMARRAY RESP PANEL 2: CPT | Performed by: HOSPITALIST

## 2019-01-01 PROCEDURE — 0BH17EZ INSERTION OF ENDOTRACHEAL AIRWAY INTO TRACHEA, VIA NATURAL OR ARTIFICIAL OPENING: ICD-10-PCS | Performed by: INTERNAL MEDICINE

## 2019-01-01 PROCEDURE — 96372 THER/PROPH/DIAG INJ SC/IM: CPT

## 2019-01-01 PROCEDURE — 84484 ASSAY OF TROPONIN QUANT: CPT | Performed by: INTERNAL MEDICINE

## 2019-01-01 PROCEDURE — 82436 ASSAY OF URINE CHLORIDE: CPT | Performed by: INTERNAL MEDICINE

## 2019-01-01 PROCEDURE — 86901 BLOOD TYPING SEROLOGIC RH(D): CPT

## 2019-01-01 PROCEDURE — 76705 ECHO EXAM OF ABDOMEN: CPT

## 2019-01-01 PROCEDURE — 81001 URINALYSIS AUTO W/SCOPE: CPT | Performed by: HOSPITALIST

## 2019-01-01 PROCEDURE — 85610 PROTHROMBIN TIME: CPT | Performed by: HOSPITALIST

## 2019-01-01 RX ORDER — MORPHINE SULFATE 2 MG/ML
2 INJECTION, SOLUTION INTRAMUSCULAR; INTRAVENOUS
Status: DISCONTINUED | OUTPATIENT
Start: 2019-01-01 | End: 2019-01-01 | Stop reason: HOSPADM

## 2019-01-01 RX ORDER — BUMETANIDE 0.25 MG/ML
2 INJECTION INTRAMUSCULAR; INTRAVENOUS EVERY 8 HOURS
Status: DISCONTINUED | OUTPATIENT
Start: 2019-01-01 | End: 2019-01-01

## 2019-01-01 RX ORDER — ALLOPURINOL 300 MG/1
300 TABLET ORAL DAILY
Status: DISCONTINUED | OUTPATIENT
Start: 2019-01-01 | End: 2019-01-01

## 2019-01-01 RX ORDER — DABIGATRAN ETEXILATE 150 MG/1
150 CAPSULE ORAL EVERY 12 HOURS SCHEDULED
Qty: 48 CAPSULE | Refills: 0 | COMMUNITY
Start: 2019-01-01

## 2019-01-01 RX ORDER — LORAZEPAM 2 MG/ML
0.5 CONCENTRATE ORAL
Status: DISCONTINUED | OUTPATIENT
Start: 2019-01-01 | End: 2019-01-01 | Stop reason: HOSPADM

## 2019-01-01 RX ORDER — PROMETHAZINE HYDROCHLORIDE 6.25 MG/5ML
6.25 SYRUP ORAL EVERY 4 HOURS PRN
Status: DISCONTINUED | OUTPATIENT
Start: 2019-01-01 | End: 2019-01-01 | Stop reason: HOSPADM

## 2019-01-01 RX ORDER — GUAIFENESIN 600 MG/1
600 TABLET, EXTENDED RELEASE ORAL EVERY 12 HOURS SCHEDULED
Status: DISCONTINUED | OUTPATIENT
Start: 2019-01-01 | End: 2019-01-01

## 2019-01-01 RX ORDER — POTASSIUM CHLORIDE 750 MG/1
10 CAPSULE, EXTENDED RELEASE ORAL DAILY
Status: DISCONTINUED | OUTPATIENT
Start: 2019-01-01 | End: 2019-01-01

## 2019-01-01 RX ORDER — PROMETHAZINE HYDROCHLORIDE 25 MG/1
6.25 TABLET ORAL EVERY 4 HOURS PRN
Status: DISCONTINUED | OUTPATIENT
Start: 2019-01-01 | End: 2019-01-01 | Stop reason: HOSPADM

## 2019-01-01 RX ORDER — ALBUTEROL SULFATE 90 UG/1
6 AEROSOL, METERED RESPIRATORY (INHALATION)
Status: DISCONTINUED | OUTPATIENT
Start: 2019-01-01 | End: 2019-01-01

## 2019-01-01 RX ORDER — IPRATROPIUM BROMIDE AND ALBUTEROL SULFATE 2.5; .5 MG/3ML; MG/3ML
1.5 SOLUTION RESPIRATORY (INHALATION)
Status: DISCONTINUED | OUTPATIENT
Start: 2019-01-01 | End: 2019-01-01

## 2019-01-01 RX ORDER — INSULIN GLARGINE 100 [IU]/ML
50 INJECTION, SOLUTION SUBCUTANEOUS DAILY
Status: DISCONTINUED | OUTPATIENT
Start: 2019-01-01 | End: 2019-01-01

## 2019-01-01 RX ORDER — SODIUM POLYSTYRENE SULFONATE 15 G/60ML
30 SUSPENSION ORAL; RECTAL ONCE
Status: COMPLETED | OUTPATIENT
Start: 2019-01-01 | End: 2019-01-01

## 2019-01-01 RX ORDER — MORPHINE SULFATE 20 MG/ML
20 SOLUTION ORAL
Status: DISCONTINUED | OUTPATIENT
Start: 2019-01-01 | End: 2019-01-01 | Stop reason: HOSPADM

## 2019-01-01 RX ORDER — CARVEDILOL 25 MG/1
25 TABLET ORAL DAILY
Status: DISCONTINUED | OUTPATIENT
Start: 2019-01-01 | End: 2019-01-01

## 2019-01-01 RX ORDER — MORPHINE SULFATE 20 MG/ML
5 SOLUTION ORAL
Status: DISCONTINUED | OUTPATIENT
Start: 2019-01-01 | End: 2019-01-01 | Stop reason: HOSPADM

## 2019-01-01 RX ORDER — ONDANSETRON 2 MG/ML
4 INJECTION INTRAMUSCULAR; INTRAVENOUS EVERY 6 HOURS PRN
Status: DISCONTINUED | OUTPATIENT
Start: 2019-01-01 | End: 2019-01-01

## 2019-01-01 RX ORDER — BUMETANIDE 0.25 MG/ML
0.5 INJECTION INTRAMUSCULAR; INTRAVENOUS ONCE
Status: COMPLETED | OUTPATIENT
Start: 2019-01-01 | End: 2019-01-01

## 2019-01-01 RX ORDER — DIPHENHYDRAMINE HCL 25 MG
25 CAPSULE ORAL EVERY 6 HOURS PRN
Status: DISCONTINUED | OUTPATIENT
Start: 2019-01-01 | End: 2019-01-01 | Stop reason: HOSPADM

## 2019-01-01 RX ORDER — FUROSEMIDE 10 MG/ML
40 INJECTION INTRAMUSCULAR; INTRAVENOUS EVERY 12 HOURS
Status: DISCONTINUED | OUTPATIENT
Start: 2019-01-01 | End: 2019-01-01

## 2019-01-01 RX ORDER — ATORVASTATIN CALCIUM 20 MG/1
20 TABLET, FILM COATED ORAL DAILY
Status: DISCONTINUED | OUTPATIENT
Start: 2019-01-01 | End: 2019-01-01

## 2019-01-01 RX ORDER — INSULIN GLARGINE 100 [IU]/ML
40 INJECTION, SOLUTION SUBCUTANEOUS DAILY
Status: DISCONTINUED | OUTPATIENT
Start: 2019-01-01 | End: 2019-01-01

## 2019-01-01 RX ORDER — BUMETANIDE 0.25 MG/ML
2 INJECTION INTRAMUSCULAR; INTRAVENOUS ONCE
Status: DISCONTINUED | OUTPATIENT
Start: 2019-01-01 | End: 2019-01-01 | Stop reason: HOSPADM

## 2019-01-01 RX ORDER — LORAZEPAM 2 MG/ML
2 CONCENTRATE ORAL
Status: DISCONTINUED | OUTPATIENT
Start: 2019-01-01 | End: 2019-01-01 | Stop reason: HOSPADM

## 2019-01-01 RX ORDER — NOREPINEPHRINE BIT/0.9 % NACL 8 MG/250ML
INFUSION BOTTLE (ML) INTRAVENOUS
Status: COMPLETED
Start: 2019-01-01 | End: 2019-01-01

## 2019-01-01 RX ORDER — GLYCOPYRROLATE 0.2 MG/ML
0.2 INJECTION INTRAMUSCULAR; INTRAVENOUS
Status: DISCONTINUED | OUTPATIENT
Start: 2019-01-01 | End: 2019-01-01 | Stop reason: HOSPADM

## 2019-01-01 RX ORDER — CARVEDILOL 12.5 MG/1
12.5 TABLET ORAL DAILY
Status: DISCONTINUED | OUTPATIENT
Start: 2019-01-01 | End: 2019-01-01

## 2019-01-01 RX ORDER — NITROGLYCERIN 0.4 MG/1
0.4 TABLET SUBLINGUAL
Status: DISCONTINUED | OUTPATIENT
Start: 2019-01-01 | End: 2019-01-01

## 2019-01-01 RX ORDER — DABIGATRAN ETEXILATE 150 MG/1
150 CAPSULE ORAL EVERY 12 HOURS SCHEDULED
Status: DISCONTINUED | OUTPATIENT
Start: 2019-01-01 | End: 2019-01-01

## 2019-01-01 RX ORDER — ATORVASTATIN CALCIUM 20 MG/1
TABLET, FILM COATED ORAL
Qty: 90 TABLET | Refills: 2 | Status: SHIPPED | OUTPATIENT
Start: 2019-01-01 | End: 2019-01-01 | Stop reason: SDUPTHER

## 2019-01-01 RX ORDER — BUMETANIDE 0.25 MG/ML
1 INJECTION INTRAMUSCULAR; INTRAVENOUS ONCE
Status: COMPLETED | OUTPATIENT
Start: 2019-01-01 | End: 2019-01-01

## 2019-01-01 RX ORDER — GLYCOPYRROLATE 0.2 MG/ML
0.4 INJECTION INTRAMUSCULAR; INTRAVENOUS
Status: DISCONTINUED | OUTPATIENT
Start: 2019-01-01 | End: 2019-01-01 | Stop reason: HOSPADM

## 2019-01-01 RX ORDER — BUMETANIDE 1 MG/1
TABLET ORAL
Refills: 99 | Status: ON HOLD | COMMUNITY
Start: 2019-01-01 | End: 2019-01-01

## 2019-01-01 RX ORDER — SCOLOPAMINE TRANSDERMAL SYSTEM 1 MG/1
1 PATCH, EXTENDED RELEASE TRANSDERMAL
Status: DISCONTINUED | OUTPATIENT
Start: 2019-01-01 | End: 2019-01-01 | Stop reason: HOSPADM

## 2019-01-01 RX ORDER — ALLOPURINOL 100 MG/1
100 TABLET ORAL DAILY
Status: DISCONTINUED | OUTPATIENT
Start: 2019-01-01 | End: 2019-01-01

## 2019-01-01 RX ORDER — ATORVASTATIN CALCIUM 20 MG/1
20 TABLET, FILM COATED ORAL DAILY
Qty: 90 TABLET | Refills: 1 | Status: SHIPPED | OUTPATIENT
Start: 2019-01-01

## 2019-01-01 RX ORDER — MORPHINE SULFATE 20 MG/ML
10 SOLUTION ORAL
Status: DISCONTINUED | OUTPATIENT
Start: 2019-01-01 | End: 2019-01-01 | Stop reason: HOSPADM

## 2019-01-01 RX ORDER — LORAZEPAM 2 MG/ML
0.5 INJECTION INTRAMUSCULAR
Status: DISCONTINUED | OUTPATIENT
Start: 2019-01-01 | End: 2019-01-01 | Stop reason: HOSPADM

## 2019-01-01 RX ORDER — DABIGATRAN ETEXILATE 150 MG/1
150 CAPSULE ORAL EVERY 12 HOURS SCHEDULED
Qty: 48 CAPSULE | Refills: 0 | COMMUNITY
Start: 2019-01-01 | End: 2019-01-01 | Stop reason: SDUPTHER

## 2019-01-01 RX ORDER — CARVEDILOL 25 MG/1
TABLET ORAL
Qty: 180 TABLET | Refills: 1 | Status: SHIPPED | OUTPATIENT
Start: 2019-01-01

## 2019-01-01 RX ORDER — INSULIN GLARGINE 100 [IU]/ML
55 INJECTION, SOLUTION SUBCUTANEOUS DAILY
Status: DISCONTINUED | OUTPATIENT
Start: 2019-01-01 | End: 2019-01-01

## 2019-01-01 RX ORDER — CARVEDILOL 25 MG/1
25 TABLET ORAL 2 TIMES DAILY WITH MEALS
Status: DISCONTINUED | OUTPATIENT
Start: 2019-01-01 | End: 2019-01-01

## 2019-01-01 RX ORDER — NICOTINE POLACRILEX 4 MG
15 LOZENGE BUCCAL
Status: DISCONTINUED | OUTPATIENT
Start: 2019-01-01 | End: 2019-01-01

## 2019-01-01 RX ORDER — LORAZEPAM 2 MG/ML
1 CONCENTRATE ORAL
Status: DISCONTINUED | OUTPATIENT
Start: 2019-01-01 | End: 2019-01-01 | Stop reason: HOSPADM

## 2019-01-01 RX ORDER — TRAZODONE HYDROCHLORIDE 100 MG/1
100 TABLET ORAL NIGHTLY PRN
COMMUNITY

## 2019-01-01 RX ORDER — DEXTROSE MONOHYDRATE 25 G/50ML
25 INJECTION, SOLUTION INTRAVENOUS
Status: DISCONTINUED | OUTPATIENT
Start: 2019-01-01 | End: 2019-01-01

## 2019-01-01 RX ORDER — DIPHENOXYLATE HYDROCHLORIDE AND ATROPINE SULFATE 2.5; .025 MG/1; MG/1
1 TABLET ORAL
Status: DISCONTINUED | OUTPATIENT
Start: 2019-01-01 | End: 2019-01-01 | Stop reason: HOSPADM

## 2019-01-01 RX ORDER — PANTOPRAZOLE SODIUM 40 MG/1
40 TABLET, DELAYED RELEASE ORAL EVERY MORNING
Status: DISCONTINUED | OUTPATIENT
Start: 2019-01-01 | End: 2019-01-01 | Stop reason: ALTCHOICE

## 2019-01-01 RX ORDER — LORAZEPAM 2 MG/ML
1 INJECTION INTRAMUSCULAR
Status: DISCONTINUED | OUTPATIENT
Start: 2019-01-01 | End: 2019-01-01 | Stop reason: HOSPADM

## 2019-01-01 RX ORDER — ALBUTEROL SULFATE 2.5 MG/3ML
2.5 SOLUTION RESPIRATORY (INHALATION)
Status: DISCONTINUED | OUTPATIENT
Start: 2019-01-01 | End: 2019-01-01

## 2019-01-01 RX ORDER — PROMETHAZINE HYDROCHLORIDE 12.5 MG/1
6.25 SUPPOSITORY RECTAL EVERY 4 HOURS PRN
Status: DISCONTINUED | OUTPATIENT
Start: 2019-01-01 | End: 2019-01-01 | Stop reason: HOSPADM

## 2019-01-01 RX ORDER — GLIMEPIRIDE 2 MG/1
2 TABLET ORAL
Qty: 90 TABLET | Refills: 1 | Status: SHIPPED | OUTPATIENT
Start: 2019-01-01 | End: 2019-01-01

## 2019-01-01 RX ORDER — TRAZODONE HYDROCHLORIDE 100 MG/1
100 TABLET ORAL NIGHTLY PRN
Status: DISCONTINUED | OUTPATIENT
Start: 2019-01-01 | End: 2019-01-01

## 2019-01-01 RX ORDER — DIPHENHYDRAMINE HYDROCHLORIDE 50 MG/ML
25 INJECTION INTRAMUSCULAR; INTRAVENOUS EVERY 6 HOURS PRN
Status: DISCONTINUED | OUTPATIENT
Start: 2019-01-01 | End: 2019-01-01 | Stop reason: HOSPADM

## 2019-01-01 RX ORDER — LORAZEPAM 2 MG/ML
2 INJECTION INTRAMUSCULAR
Status: DISCONTINUED | OUTPATIENT
Start: 2019-01-01 | End: 2019-01-01 | Stop reason: HOSPADM

## 2019-01-01 RX ORDER — DEXTROSE MONOHYDRATE 25 G/50ML
50 INJECTION, SOLUTION INTRAVENOUS ONCE
Status: COMPLETED | OUTPATIENT
Start: 2019-01-01 | End: 2019-01-01

## 2019-01-01 RX ORDER — MORPHINE SULFATE 2 MG/ML
4 INJECTION, SOLUTION INTRAMUSCULAR; INTRAVENOUS
Status: DISCONTINUED | OUTPATIENT
Start: 2019-01-01 | End: 2019-01-01 | Stop reason: HOSPADM

## 2019-01-01 RX ORDER — LINAGLIPTIN 5 MG/1
5 TABLET, FILM COATED ORAL DAILY
Qty: 90 TABLET | Refills: 1 | Status: SHIPPED | OUTPATIENT
Start: 2019-01-01

## 2019-01-01 RX ORDER — METOLAZONE 5 MG/1
5 TABLET ORAL DAILY
Status: DISCONTINUED | OUTPATIENT
Start: 2019-01-01 | End: 2019-01-01

## 2019-01-01 RX ORDER — NOREPINEPHRINE BIT/0.9 % NACL 8 MG/250ML
.02-.3 INFUSION BOTTLE (ML) INTRAVENOUS
Status: DISCONTINUED | OUTPATIENT
Start: 2019-01-01 | End: 2019-01-01

## 2019-01-01 RX ORDER — ACETAMINOPHEN 325 MG/1
650 TABLET ORAL EVERY 4 HOURS PRN
Status: DISCONTINUED | OUTPATIENT
Start: 2019-01-01 | End: 2019-01-01

## 2019-01-01 RX ORDER — PEN NEEDLE, DIABETIC 32GX 5/32"
NEEDLE, DISPOSABLE MISCELLANEOUS
Qty: 100 EACH | Refills: 0 | Status: SHIPPED | OUTPATIENT
Start: 2019-01-01

## 2019-01-01 RX ORDER — POTASSIUM CHLORIDE 750 MG/1
20 CAPSULE, EXTENDED RELEASE ORAL 2 TIMES DAILY WITH MEALS
Status: DISCONTINUED | OUTPATIENT
Start: 2019-01-01 | End: 2019-01-01

## 2019-01-01 RX ORDER — FENTANYL CITRATE 50 UG/ML
50 INJECTION, SOLUTION INTRAMUSCULAR; INTRAVENOUS
Status: DISCONTINUED | OUTPATIENT
Start: 2019-01-01 | End: 2019-01-01

## 2019-01-01 RX ORDER — MORPHINE SULFATE 10 MG/ML
6 INJECTION INTRAMUSCULAR; INTRAVENOUS; SUBCUTANEOUS
Status: DISCONTINUED | OUTPATIENT
Start: 2019-01-01 | End: 2019-01-01 | Stop reason: HOSPADM

## 2019-01-01 RX ORDER — FUROSEMIDE 40 MG/1
40 TABLET ORAL 3 TIMES DAILY
COMMUNITY

## 2019-01-01 RX ORDER — CEFAZOLIN SODIUM 1 G/50ML
1 INJECTION, SOLUTION INTRAVENOUS EVERY 12 HOURS
Status: DISCONTINUED | OUTPATIENT
Start: 2019-01-01 | End: 2019-01-01

## 2019-01-01 RX ORDER — PROMETHAZINE HYDROCHLORIDE 25 MG/ML
6.25 INJECTION, SOLUTION INTRAMUSCULAR; INTRAVENOUS EVERY 4 HOURS PRN
Status: DISCONTINUED | OUTPATIENT
Start: 2019-01-01 | End: 2019-01-01 | Stop reason: HOSPADM

## 2019-01-01 RX ORDER — NITROGLYCERIN 0.4 MG/1
TABLET SUBLINGUAL
Qty: 25 TABLET | Refills: 0 | Status: SHIPPED | OUTPATIENT
Start: 2019-01-01

## 2019-01-01 RX ORDER — CEFAZOLIN SODIUM 1 G/50ML
1 INJECTION, SOLUTION INTRAVENOUS EVERY 8 HOURS
Status: DISCONTINUED | OUTPATIENT
Start: 2019-01-01 | End: 2019-01-01

## 2019-01-01 RX ORDER — HYDROMORPHONE HYDROCHLORIDE 1 MG/ML
0.5 INJECTION, SOLUTION INTRAMUSCULAR; INTRAVENOUS; SUBCUTANEOUS
Status: DISCONTINUED | OUTPATIENT
Start: 2019-01-01 | End: 2019-01-01 | Stop reason: HOSPADM

## 2019-01-01 RX ORDER — POTASSIUM CHLORIDE 750 MG/1
10 CAPSULE, EXTENDED RELEASE ORAL 2 TIMES DAILY WITH MEALS
Status: DISCONTINUED | OUTPATIENT
Start: 2019-01-01 | End: 2019-01-01

## 2019-01-01 RX ORDER — LINAGLIPTIN 5 MG/1
5 TABLET, FILM COATED ORAL DAILY
Qty: 90 TABLET | Refills: 1 | Status: SHIPPED | OUTPATIENT
Start: 2019-01-01 | End: 2019-01-01 | Stop reason: SDUPTHER

## 2019-01-01 RX ORDER — PEN NEEDLE, DIABETIC 32GX 5/32"
NEEDLE, DISPOSABLE MISCELLANEOUS
Qty: 100 EACH | Refills: 0 | Status: SHIPPED | OUTPATIENT
Start: 2019-01-01 | End: 2019-01-01 | Stop reason: SDUPTHER

## 2019-01-01 RX ADMIN — INSULIN HUMAN 10 UNITS: 100 INJECTION, SOLUTION PARENTERAL at 05:16

## 2019-01-01 RX ADMIN — FENTANYL CITRATE 50 MCG: 50 INJECTION INTRAMUSCULAR; INTRAVENOUS at 04:24

## 2019-01-01 RX ADMIN — BUMETANIDE 1 MG/HR: 0.25 INJECTION INTRAMUSCULAR; INTRAVENOUS at 15:35

## 2019-01-01 RX ADMIN — CEFAZOLIN SODIUM 1 G: 1 INJECTION, SOLUTION INTRAVENOUS at 16:07

## 2019-01-01 RX ADMIN — LINAGLIPTIN 5 MG: 5 TABLET, FILM COATED ORAL at 08:14

## 2019-01-01 RX ADMIN — ALBUTEROL SULFATE 2.5 MG: 2.5 SOLUTION RESPIRATORY (INHALATION) at 19:07

## 2019-01-01 RX ADMIN — CALCIUM GLUCONATE 1 G: 98 INJECTION, SOLUTION INTRAVENOUS at 14:56

## 2019-01-01 RX ADMIN — CEFAZOLIN SODIUM 1 G: 1 INJECTION, SOLUTION INTRAVENOUS at 05:44

## 2019-01-01 RX ADMIN — BUMETANIDE 2 MG: 0.25 INJECTION INTRAMUSCULAR; INTRAVENOUS at 10:26

## 2019-01-01 RX ADMIN — SODIUM BICARBONATE 50 MEQ: 84 INJECTION, SOLUTION INTRAVENOUS at 02:30

## 2019-01-01 RX ADMIN — BUMETANIDE 1 MG/HR: 0.25 INJECTION INTRAMUSCULAR; INTRAVENOUS at 21:17

## 2019-01-01 RX ADMIN — CARVEDILOL 12.5 MG: 12.5 TABLET, FILM COATED ORAL at 20:00

## 2019-01-01 RX ADMIN — GUAIFENESIN 600 MG: 600 TABLET, EXTENDED RELEASE ORAL at 20:00

## 2019-01-01 RX ADMIN — METOLAZONE 5 MG: 5 TABLET ORAL at 16:43

## 2019-01-01 RX ADMIN — ACETAMINOPHEN 650 MG: 325 TABLET, FILM COATED ORAL at 20:00

## 2019-01-01 RX ADMIN — CEFAZOLIN SODIUM 1 G: 1 INJECTION, SOLUTION INTRAVENOUS at 06:53

## 2019-01-01 RX ADMIN — ALLOPURINOL 100 MG: 100 TABLET ORAL at 08:03

## 2019-01-01 RX ADMIN — METOLAZONE 5 MG: 5 TABLET ORAL at 08:03

## 2019-01-01 RX ADMIN — CARVEDILOL 25 MG: 12.5 TABLET, FILM COATED ORAL at 08:14

## 2019-01-01 RX ADMIN — DEXTROSE MONOHYDRATE 50 ML: 70 INJECTION, SOLUTION INTRAVENOUS at 05:15

## 2019-01-01 RX ADMIN — CEFAZOLIN SODIUM 1 G: 1 INJECTION, SOLUTION INTRAVENOUS at 03:52

## 2019-01-01 RX ADMIN — ALBUTEROL SULFATE 6 PUFF: 90 AEROSOL, METERED RESPIRATORY (INHALATION) at 07:00

## 2019-01-01 RX ADMIN — IPRATROPIUM BROMIDE AND ALBUTEROL SULFATE 1.5 ML: 2.5; .5 SOLUTION RESPIRATORY (INHALATION) at 23:42

## 2019-01-01 RX ADMIN — GUAIFENESIN 600 MG: 600 TABLET, EXTENDED RELEASE ORAL at 22:43

## 2019-01-01 RX ADMIN — INSULIN LISPRO 2 UNITS: 100 INJECTION, SOLUTION INTRAVENOUS; SUBCUTANEOUS at 12:59

## 2019-01-01 RX ADMIN — PANTOPRAZOLE SODIUM 40 MG: 40 TABLET, DELAYED RELEASE ORAL at 06:18

## 2019-01-01 RX ADMIN — CARVEDILOL 12.5 MG: 12.5 TABLET, FILM COATED ORAL at 13:05

## 2019-01-01 RX ADMIN — BUMETANIDE 1 MG/HR: 0.25 INJECTION INTRAMUSCULAR; INTRAVENOUS at 11:41

## 2019-01-01 RX ADMIN — ALBUTEROL SULFATE 2.5 MG: 2.5 SOLUTION RESPIRATORY (INHALATION) at 19:46

## 2019-01-01 RX ADMIN — ERYTHROPOIETIN 20000 UNITS: 20000 INJECTION, SOLUTION INTRAVENOUS; SUBCUTANEOUS at 11:20

## 2019-01-01 RX ADMIN — Medication 0.03 MCG/KG/MIN: at 03:52

## 2019-01-01 RX ADMIN — CARVEDILOL 12.5 MG: 12.5 TABLET, FILM COATED ORAL at 14:59

## 2019-01-01 RX ADMIN — ALBUTEROL SULFATE 2.5 MG: 2.5 SOLUTION RESPIRATORY (INHALATION) at 14:44

## 2019-01-01 RX ADMIN — CEFAZOLIN SODIUM 1 G: 1 INJECTION, SOLUTION INTRAVENOUS at 13:04

## 2019-01-01 RX ADMIN — INSULIN GLARGINE 45 UNITS: 100 INJECTION, SOLUTION SUBCUTANEOUS at 12:59

## 2019-01-01 RX ADMIN — BUMETANIDE 1 MG/HR: 0.25 INJECTION INTRAMUSCULAR; INTRAVENOUS at 05:44

## 2019-01-01 RX ADMIN — CARVEDILOL 12.5 MG: 12.5 TABLET, FILM COATED ORAL at 22:43

## 2019-01-01 RX ADMIN — SODIUM POLYSTYRENE SULFONATE 30 G: 15 SUSPENSION ORAL; RECTAL at 14:41

## 2019-01-01 RX ADMIN — MORPHINE SULFATE 2 MG: 2 INJECTION, SOLUTION INTRAMUSCULAR; INTRAVENOUS at 13:58

## 2019-01-01 RX ADMIN — ALBUTEROL SULFATE 2.5 MG: 2.5 SOLUTION RESPIRATORY (INHALATION) at 15:27

## 2019-01-01 RX ADMIN — CALCIUM GLUCONATE 1 G: 98 INJECTION, SOLUTION INTRAVENOUS at 05:28

## 2019-01-01 RX ADMIN — CEFAZOLIN SODIUM 1 G: 1 INJECTION, SOLUTION INTRAVENOUS at 21:30

## 2019-01-01 RX ADMIN — ALBUTEROL SULFATE 2.5 MG: 2.5 SOLUTION RESPIRATORY (INHALATION) at 11:13

## 2019-01-01 RX ADMIN — POTASSIUM CHLORIDE 10 MEQ: 750 CAPSULE, EXTENDED RELEASE ORAL at 09:53

## 2019-01-01 RX ADMIN — BUMETANIDE 1 MG: 0.25 INJECTION INTRAMUSCULAR; INTRAVENOUS at 15:35

## 2019-01-01 RX ADMIN — DEXTROSE MONOHYDRATE 25 G: 70 INJECTION, SOLUTION INTRAVENOUS at 21:26

## 2019-01-01 RX ADMIN — CEFAZOLIN SODIUM 1 G: 1 INJECTION, SOLUTION INTRAVENOUS at 04:39

## 2019-01-01 RX ADMIN — CEFAZOLIN SODIUM 1 G: 1 INJECTION, SOLUTION INTRAVENOUS at 14:59

## 2019-01-01 RX ADMIN — GUAIFENESIN 600 MG: 600 TABLET, EXTENDED RELEASE ORAL at 08:03

## 2019-01-01 RX ADMIN — ALLOPURINOL 100 MG: 100 TABLET ORAL at 09:53

## 2019-01-01 RX ADMIN — PERFLUTREN 2 ML: 6.52 INJECTION, SUSPENSION INTRAVENOUS at 08:30

## 2019-01-01 RX ADMIN — DEXTROSE MONOHYDRATE 25 G: 70 INJECTION, SOLUTION INTRAVENOUS at 00:37

## 2019-01-01 RX ADMIN — TRAZODONE HYDROCHLORIDE 100 MG: 100 TABLET ORAL at 20:00

## 2019-01-01 RX ADMIN — BUMETANIDE 2 MG: 0.25 INJECTION INTRAMUSCULAR; INTRAVENOUS at 13:03

## 2019-01-01 RX ADMIN — IPRATROPIUM BROMIDE AND ALBUTEROL SULFATE 1.5 ML: 2.5; .5 SOLUTION RESPIRATORY (INHALATION) at 07:04

## 2019-01-01 RX ADMIN — CARVEDILOL 12.5 MG: 12.5 TABLET, FILM COATED ORAL at 14:41

## 2019-01-01 RX ADMIN — FENTANYL CITRATE 50 MCG: 50 INJECTION INTRAMUSCULAR; INTRAVENOUS at 06:33

## 2019-01-01 RX ADMIN — ONDANSETRON HYDROCHLORIDE 4 MG: 2 SOLUTION INTRAMUSCULAR; INTRAVENOUS at 14:38

## 2019-01-01 RX ADMIN — GUAIFENESIN 600 MG: 600 TABLET, EXTENDED RELEASE ORAL at 21:33

## 2019-01-01 RX ADMIN — CARVEDILOL 25 MG: 12.5 TABLET, FILM COATED ORAL at 09:53

## 2019-01-01 RX ADMIN — DEXTROSE MONOHYDRATE 50 ML: 25 INJECTION, SOLUTION INTRAVENOUS at 14:48

## 2019-01-01 RX ADMIN — POTASSIUM CHLORIDE 20 MEQ: 750 CAPSULE, EXTENDED RELEASE ORAL at 08:14

## 2019-01-01 RX ADMIN — ALBUTEROL SULFATE 2.5 MG: 2.5 SOLUTION RESPIRATORY (INHALATION) at 15:01

## 2019-01-01 RX ADMIN — PANTOPRAZOLE SODIUM 40 MG: 40 TABLET, DELAYED RELEASE ORAL at 06:35

## 2019-01-01 RX ADMIN — CEFAZOLIN SODIUM 1 G: 1 INJECTION, SOLUTION INTRAVENOUS at 22:43

## 2019-01-01 RX ADMIN — CARVEDILOL 12.5 MG: 12.5 TABLET, FILM COATED ORAL at 21:33

## 2019-01-01 RX ADMIN — VASOPRESSIN 0.03 UNITS/MIN: 20 INJECTION INTRAVENOUS at 08:24

## 2019-01-01 RX ADMIN — MORPHINE SULFATE 2 MG: 2 INJECTION, SOLUTION INTRAMUSCULAR; INTRAVENOUS at 11:38

## 2019-01-01 RX ADMIN — CARVEDILOL 25 MG: 12.5 TABLET, FILM COATED ORAL at 08:03

## 2019-01-01 RX ADMIN — BUMETANIDE 2 MG: 0.25 INJECTION INTRAMUSCULAR; INTRAVENOUS at 21:29

## 2019-01-01 RX ADMIN — IPRATROPIUM BROMIDE AND ALBUTEROL SULFATE 1.5 ML: 2.5; .5 SOLUTION RESPIRATORY (INHALATION) at 11:04

## 2019-01-01 RX ADMIN — LINAGLIPTIN 5 MG: 5 TABLET, FILM COATED ORAL at 09:53

## 2019-01-01 RX ADMIN — SODIUM CHLORIDE 8 MG/HR: 900 INJECTION INTRAVENOUS at 09:04

## 2019-01-01 RX ADMIN — BUMETANIDE 2 MG: 0.25 INJECTION INTRAMUSCULAR; INTRAVENOUS at 06:35

## 2019-01-01 RX ADMIN — PANTOPRAZOLE SODIUM 40 MG: 40 TABLET, DELAYED RELEASE ORAL at 06:40

## 2019-01-01 RX ADMIN — ONDANSETRON HYDROCHLORIDE 4 MG: 2 SOLUTION INTRAMUSCULAR; INTRAVENOUS at 21:26

## 2019-01-01 RX ADMIN — BUMETANIDE 1 MG/HR: 0.25 INJECTION INTRAMUSCULAR; INTRAVENOUS at 22:52

## 2019-01-01 RX ADMIN — FENTANYL CITRATE 50 MCG: 50 INJECTION INTRAMUSCULAR; INTRAVENOUS at 07:43

## 2019-01-01 RX ADMIN — SODIUM CHLORIDE 8 MG/HR: 900 INJECTION INTRAVENOUS at 04:37

## 2019-01-01 RX ADMIN — TRAZODONE HYDROCHLORIDE 100 MG: 100 TABLET ORAL at 22:44

## 2019-01-01 RX ADMIN — ACETAMINOPHEN 650 MG: 325 TABLET, FILM COATED ORAL at 22:43

## 2019-01-01 RX ADMIN — GUAIFENESIN 600 MG: 600 TABLET, EXTENDED RELEASE ORAL at 08:14

## 2019-01-01 RX ADMIN — ALLOPURINOL 100 MG: 100 TABLET ORAL at 08:14

## 2019-01-01 RX ADMIN — ONDANSETRON HYDROCHLORIDE 4 MG: 2 SOLUTION INTRAMUSCULAR; INTRAVENOUS at 08:15

## 2019-01-01 RX ADMIN — IPRATROPIUM BROMIDE AND ALBUTEROL SULFATE 1.5 ML: 2.5; .5 SOLUTION RESPIRATORY (INHALATION) at 03:41

## 2019-01-01 RX ADMIN — GUAIFENESIN 600 MG: 600 TABLET, EXTENDED RELEASE ORAL at 09:53

## 2019-01-01 RX ADMIN — SODIUM CHLORIDE 1000 ML: 9 INJECTION, SOLUTION INTRAVENOUS at 02:30

## 2019-01-01 RX ADMIN — Medication 0.3 MCG/KG/MIN: at 08:58

## 2019-01-11 NOTE — TELEPHONE ENCOUNTER
Pt is calling for samples of pradaxa 150mg bid. Can you check and see if you have any and let me know? Thanks!

## 2019-01-14 NOTE — PATIENT INSTRUCTIONS
Increase toujeo to 56 units daily then every 4 days if morning bs is greater than 110 mg/dl increase by 2 units   glimiperide 2 mg once daily with breakfast. Don't skip meals

## 2019-01-14 NOTE — PROGRESS NOTES
"Subjective   Renuka Patel is a 78 y.o. female is here today for follow-up.  Chief Complaint   Patient presents with   • Diabetes     recent labs, testing BG 5 times daily, pt did not  bring meter   • Hyperlipidemia     pt has gained weight and thinks it might be the toujeo   • Hypertension   • Vitamin D Deficiency     /70   Ht 161.3 cm (63.5\")   Wt 118 kg (260 lb)   BMI 45.33 kg/m²   Current Outpatient Medications on File Prior to Visit   Medication Sig   • albuterol (PROVENTIL) (2.5 MG/3ML) 0.083% nebulizer solution INHALE THE CONTENTS OF ONE VIAL (3ML) EVERY 4 HOURS AS NEEDED   • allopurinol (ZYLOPRIM) 300 MG tablet Take 1 tablet by mouth Daily.   • atorvastatin (LIPITOR) 20 MG tablet Take 1 tablet by mouth Daily.   • BD PEN NEEDLE CHRIS U/F 32G X 4 MM misc USE AS DIRECTED   • carvedilol (COREG) 25 MG tablet TAKE ONE TABLET IN THE MORNING, 1/2 TABLET AT NOON AND 1/2 TABLET AT BEDTIME   • dabigatran etexilate (PRADAXA) 150 MG capsu Take 1 capsule by mouth Every 12 (Twelve) Hours.   • diphenhydrAMINE (BENADRYL) 25 MG tablet Take 25 mg by mouth Every 6 (Six) Hours As Needed for Itching.   • fluticasone (FLONASE) 50 MCG/ACT nasal spray 2 sprays into each nostril Daily.   • Fluticasone-Umeclidin-Vilant (TRELEGY ELLIPTA IN) Inhale Daily.   • furosemide (LASIX) 40 MG tablet TAKE 1 TABLET BY MOUTH AM AND 1 TABLET MID DAY AND 1/2 TABLET PM (Patient taking differently: Take 40 mg by mouth 3 (Three) Times a Day.)   • ipratropium-albuterol (DUO-NEB) 0.5-2.5 mg/mL nebulizer INHALE THE contents OF ONE vial using nebulizer FOUR TIMES DAILY AS NEEDED   • levocetirizine (XYZAL) 5 MG tablet Take 5 mg by mouth Every Evening.   • Melatonin 10 MG capsule Take 1 capsule by mouth every night.   • nitroglycerin (NITROSTAT) 0.4 MG SL tablet Place 1 tablet under the tongue Every 5 (Five) Minutes As Needed for chest pain.   • omeprazole (priLOSEC) 40 MG capsule Take 40 mg by mouth Daily.   • potassium chloride (K-DUR,KLOR-CON) 20 " MEQ CR tablet Take 1 tablet by mouth Daily.   • [DISCONTINUED] Insulin Glargine 300 UNIT/ML solution pen-injector Inject 50 Units under the skin into the appropriate area as directed Daily.   • [DISCONTINUED] TRADJENTA 5 MG tablet tablet Take 1 tablet by mouth Daily.     No current facility-administered medications on file prior to visit.      Family History   Problem Relation Age of Onset   • Breast cancer Mother 60   • Breast cancer Sister 50   • Arrhythmia Sister    • Hypertension Sister    • Brain cancer Father 38   • Breast cancer Sister 40   • Heart attack Brother    • Hypertension Brother    • Diabetes Son    • Hypertension Son    • Heart disease Maternal Grandmother      Social History     Tobacco Use   • Smoking status: Former Smoker     Packs/day: 1.00     Years: 25.00     Pack years: 25.00     Last attempt to quit: 2004     Years since quitting: 15.0   • Smokeless tobacco: Never Used   • Tobacco comment: caffeine use   Substance Use Topics   • Alcohol use: Yes     Comment: Rare   • Drug use: No     Allergies   Allergen Reactions   • Codeine Itching     Edema   • Hydralazine Hcl      BP drops.   • Lisinopril Cough     Edema         History of Present Illness   Encounter Diagnoses   Name Primary?   • Mixed hyperlipidemia    • Essential hypertension    • Vitamin D deficiency    • Morbid obesity with BMI of 40.0-44.9, adult (CMS/[facility])    • Type 2 diabetes mellitus with stage 3 chronic kidney disease, with long-term current use of insulin (CMS/[facility]) Yes   78-year-old female patient here today for a follow-up visit.  She is a copy by her daughter.  She uses a walker and is on nasal oxygen continuous.  She states she's been on steroid several times recently due to congestive heart failure.  She feels like she had about a 15 pound weight gain.  She has not been watching her diet and her blood sugars have gone higher.  She states her highest blood sugar has been 520 and her lowest has been 103.  She states her blood  sugar was 146 this morning.  She is on a lot of diuretic to get rid of the fluid off her lungs.  We discussed increasing her insulin and titrating it to target her morning blood sugars to less than 110.  She has renal insufficiency and is currently on Tradjenta.  She does have a concern of cost of her medications and has applied for financial assistance for the insulin.  We reviewed options for treatment of diabetes.        The following portions of the patient's history were reviewed and updated as appropriate: allergies, current medications, past family history, past medical history, past social history, past surgical history and problem list.    Review of Systems   Constitutional: Negative for fatigue.   HENT: Negative for trouble swallowing.    Eyes: Negative for visual disturbance.   Cardiovascular: Negative for leg swelling.   Endocrine: Negative for polyuria.   Skin: Negative for wound.   Neurological: Negative for numbness.       Objective   Physical Exam   Constitutional: She is oriented to person, place, and time. She appears well-developed and well-nourished. No distress.   HENT:   Head: Normocephalic and atraumatic.   Right Ear: External ear normal.   Left Ear: External ear normal.   Nose: Nose normal.   Mouth/Throat: Oropharynx is clear and moist. No oropharyngeal exudate.   Eyes: Conjunctivae and EOM are normal. Pupils are equal, round, and reactive to light. Right eye exhibits no discharge. Left eye exhibits no discharge. No scleral icterus.   Neck: Normal range of motion. Neck supple. No JVD present. No tracheal deviation present. No thyromegaly present.   Cardiovascular: Normal rate, regular rhythm, normal heart sounds and intact distal pulses. Exam reveals no gallop and no friction rub.   No murmur heard.  Hx of chf   Pulmonary/Chest: Effort normal and breath sounds normal. No stridor. No respiratory distress. She has no wheezes. She has no rales. She exhibits no tenderness.   Abdominal: Soft.  Bowel sounds are normal. She exhibits no distension and no mass. There is no tenderness. There is no rebound and no guarding. No hernia.   Musculoskeletal: Normal range of motion. She exhibits edema. She exhibits no tenderness or deformity.   1 + edema in lower ext   Lymphadenopathy:     She has no cervical adenopathy.   Neurological: She is alert and oriented to person, place, and time. She has normal reflexes. She displays normal reflexes. No cranial nerve deficit or sensory deficit. She exhibits normal muscle tone. Coordination normal.   Skin: Skin is warm and dry. No rash noted. She is not diaphoretic. No erythema. No pallor.   Psychiatric: She has a normal mood and affect. Her behavior is normal. Judgment and thought content normal.   Nursing note and vitals reviewed.    Results for orders placed or performed in visit on 12/17/18   Comprehensive Metabolic Panel   Result Value Ref Range    Glucose 346 (H) 65 - 99 mg/dL    BUN 29 (H) 8 - 23 mg/dL    Creatinine 1.25 (H) 0.57 - 1.00 mg/dL    eGFR Non African Am 41 (L) >60 mL/min/1.73    eGFR African Am 50 (L) >60 mL/min/1.73    BUN/Creatinine Ratio 23.2 7.0 - 25.0    Sodium 136 136 - 145 mmol/L    Potassium 4.4 3.5 - 5.2 mmol/L    Chloride 92 (L) 98 - 107 mmol/L    Total CO2 29.6 (H) 22.0 - 29.0 mmol/L    Calcium 8.9 8.6 - 10.5 mg/dL    Total Protein 6.3 6.0 - 8.5 g/dL    Albumin 4.10 3.50 - 5.20 g/dL    Globulin 2.2 gm/dL    A/G Ratio 1.9 g/dL    Total Bilirubin 0.3 0.1 - 1.2 mg/dL    Alkaline Phosphatase 104 39 - 117 U/L    AST (SGOT) 12 1 - 32 U/L    ALT (SGPT) 9 1 - 33 U/L   C-Peptide   Result Value Ref Range    C-Peptide 3.2 1.1 - 4.4 ng/mL   Hemoglobin A1c   Result Value Ref Range    Hemoglobin A1C 10.60 (H) 4.80 - 5.60 %   Lipid Panel   Result Value Ref Range    Total Cholesterol 150 0 - 200 mg/dL    Triglycerides 157 (H) 0 - 150 mg/dL    HDL Cholesterol 55 40 - 60 mg/dL    VLDL Cholesterol 31.4 5 - 40 mg/dL    LDL Cholesterol  64 0 - 100 mg/dL   Vitamin D  25 Hydroxy   Result Value Ref Range    25 Hydroxy, Vitamin D 34.7 30.0 - 100.0 ng/ml   MicroAlbumin, Urine, Random - Urine, Clean Catch   Result Value Ref Range    Microalbumin, Urine 31.4 Not Estab. ug/mL   [facility] CKD Program   Result Value Ref Range    Interpretation Note    Cardiovascular Risk Assessment   Result Value Ref Range    Interpretation Note     PDF Image Not applicable    Diabetes Patient Education   Result Value Ref Range    PDF Image Not applicable          Assessment/Plan   Problems Addressed this Visit        Cardiovascular and Mediastinum    Hyperlipidemia    Hypertension       Digestive    Vitamin D deficiency    Morbid obesity with BMI of 40.0-44.9, adult (CMS/[facility])       Endocrine    Type 2 diabetes with stage 3 chronic kidney disease GFR 30-59 (CMS/[facility]) - Primary    Relevant Medications    Insulin Glargine 300 UNIT/ML solution pen-injector    TRADJENTA 5 MG tablet tablet    glimepiride (AMARYL) 2 MG tablet        In summary, patient was seen and examined.  She'll be started on glimepiride 2 mg once daily with her first morning meal.  She's been cautioned to monitor closely for hypoglycemia.  I've increased her toujeo to 56 units.  She's been advised to increase by 2 units every 4 days if morning blood sugars are staying greater than 110 mg/dL.  The prescriptions have been sent to her pharmacy per her request.  Metabolically she is stable however her hemoglobin A1c is not a goal of less than 7.  She's been advised to contact the office of her blood sugars failed to improve and also call her blood sugar report into the office.  She will follow up Dr. Balbuena or myself in 4 months with labs prior.

## 2019-03-18 NOTE — TELEPHONE ENCOUNTER
Pt called and c/o of swelling of ankles and feet, SOB. She started wearing her compression stocking yesterday it help a little bit. Pt is taking lasix 40 mg TID and she add another 1/2 tablet daily to make the swelling go away but it did not help........PLease advise      Thanks  Charissa THOMPSON

## 2019-03-28 NOTE — TELEPHONE ENCOUNTER
S/w pt. Pt states that her swelling is back and she started taking 80 mg BID for 3 days again then back to 40 mg TID. Is that ok?    Next pt f/u 4/30/19      Thanks  Charissa EAGLE

## 2019-03-28 NOTE — TELEPHONE ENCOUNTER
03/28/19 ( Dr Rios Pt)   Pt called requesting samples of Pradaxa 150mg.   She states last time she call there were no samples available.   Pt states she is almost out of medication.   Pt's call back #  [phone]   Thanks Felipe white

## 2019-03-28 NOTE — TELEPHONE ENCOUNTER
Called and informed pt that she need her BMP next week. Pt states that she had blood works yesterday from her kidney dr. Does pt still need to get another one?....Please advise      Thanks  Charissa THOMPSON

## 2019-04-18 NOTE — TELEPHONE ENCOUNTER
Pt has been informed of decrease in insulin and stopping glimepiride.     ----- Message from REGAN Conway sent at 4/18/2019 12:25 PM EDT -----  Contact: patient  Stop glimepiride and decrease toujeo from 62 to 58  ----- Message -----  From: Keya Cantu MA  Sent: 4/18/2019  12:13 PM  To: REGAN Conway     bg log is on your desk  ----- Message -----  From: Piedad Vega  Sent: 4/18/2019  11:36 AM  To: Keya Cantu MA    Patient said her blood sugar has been low for the last 3 or 4 mornings as low as 60. She is asking if she needs to stop Glimepiride 2mg, 1xday  for couple of days.  She asked when her blood sugar is that low should she take Toujeo or wait  until her blood sugar comes up. She said she has been waiting until later in the day to take Toujeo.    I will bring blood sugar readings to you.    Pt - [phone]

## 2019-04-22 NOTE — TELEPHONE ENCOUNTER
----- Message from Dot Franz RN sent at 4/22/2019 10:30 AM EDT -----  Please schedule pt for CBC, STAT iron profile, and ferritin and RN review either tomorrow, thurs or Friday of this week.

## 2019-04-22 NOTE — TELEPHONE ENCOUNTER
----- Message from Keely Dahl sent at 4/22/2019  9:33 AM EDT -----  Contact: [phone]  Pt is calling to get appt she is very tired      Pt calling because she has been feeling very fatigued over the last few weeks. She is SOA on exertion and just does not have any energy. She is wondering if she can come in to have labs drawn. Orders placed for CBC, ferritin and iron and RN review and message sent to scheduling. Informed pt scheduling would be calling her to set up apt. She v/u.

## 2019-04-26 NOTE — PROGRESS NOTES
Agree.  No need for IV iron with those numbers.  If Hb drops below 10 and the future, we could restart Procrit.  Perhaps it would be good to arrange a CBC every 2 weeks or so with possible Procrit.  Can you please do this?  Thank you    ===View-only below this line===    ----- Message -----  From: Dot Franz RN  Sent: 4/26/2019  10:21 AM  To: MD Dr. Tanner Sams II,  Pt was here today for RN review. Hgb was down to 10.1. Pt complains of SOA and extreme fatigue (pt has a hx of COPD and is on oxygen). Ferritin and iron were pending when pt left. Ferritin came back at 355 and iron sat was 18%. Pt last received 1 dose of injectafer in December. I do not believe she qualifies for IV iron. It looks like pt has an apt with Dr. Gautam to discuss colonoscopy. Please advise on any new orders. Thank you!

## 2019-04-26 NOTE — TELEPHONE ENCOUNTER
----- Message from Dot Franz RN sent at 4/26/2019  3:41 PM EDT -----  Please schedule CBC with possible Procrit every 2 weeks until pt sees Dr. Hart. It was found today that pt may possibly have bed bugs so may need a private room for the Procrit injection.       Bailee: can you please make sure pt will qualify for EPO? Thank you

## 2019-04-26 NOTE — PROGRESS NOTES
Gisela Landin RN reviewed pt's CBC with pt. She reports that pt voiced SOA and extreme fatigue. Pt has a hx of COPD and is oxygen dependent. Gisela advised pt that ferritin and iron were still pending today and we would call her with results. She also advised her that if the SOA worsens she needs to go to the ER. She v/u. Ferritin and iron panel resulted. Message sent to Dr. Hart to see if any new orders.       Per Dr. Code:  No need for IV iron with those numbers.  If Hb drops below 10 and the future, we could restart Procrit.   Perhaps it would be good to arrange a CBC every 2 weeks or so with possible Procrit.  Can you please do this?     Called pt and explained this to her. She v/u. Message sent to scheduling to make apts.       Lab Results   Component Value Date    WBC 12.46 (H) 04/26/2019    HGB 10.1 (L) 04/26/2019    HCT 32.8 (L) 04/26/2019    .0 (H) 04/26/2019     (L) 04/26/2019

## 2019-04-29 NOTE — PROGRESS NOTES
Chief Complaint   Patient presents with   • Pre-op Exam     consult Colonoscopy    • Anemia       History of Present Illness: 78-year-old female who has a personal history of colon cancer.  January 2014 Dr. Nathan Bernal resected a distal transverse colon/descending colon pT1, pN0 colon cancer.       She has become more anemic. Dr. Hart is wondering if she should have a colonoscopy. She has CHF, COPD (on 3-4 LPM of O2 all the time) , DM, obese, on Pradaxa for a fib, CKD, HTN. No bright red rectal bleeding or melena. NO diarrhea or constipation. No abdominal or chest pain. Her weight has gone up. Denies NSAIDs but is on Pradaxa for a fib. Nonsmoker, No ETOH    Past Medical History:   Diagnosis Date   • Abdominal aortic aneurysm (CMS/HCC)     3.6 cm infrarenal January 2014   • Acute bronchitis    • Anemia     Secondary to stage 3 chronic kidney disease and hx of iron deficiency; followed by Dr. Manuel Hart   • Anxiety    • Atrial fibrillation (CMS/HCC) 12/2010    Previous cardioversion; status post AV node ablation by Dr. Bear Rodrigues 5/2012   • Back pain    • Breast cancer (CMS/HCC)     Stage I, status post lumpectomy; history of radiation   • CAD (coronary artery disease)    • Cardiomyopathy (CMS/HCC)    • CHF (congestive heart failure) (CMS/HCC)     Acute on chronic systolic and diastolic; followed by Dr. Rhiannon Rios   • Chronic kidney disease     Followed by Dr. Anna Gerardo    • Colon cancer (CMS/HCC)     Stage I, resected 01/27/2014   • COPD (chronic obstructive pulmonary disease) (CMS/HCC)     Oxygen dependent; followed by Dr. Reza   • Esophageal reflux    • Hernia of anterior abdominal wall 3/11/2016   • Hyperlipidemia    • Hypertension    • Joint pain     IN THE RIGHT KNEE   • Mass of adrenal gland (CMS/HCC)     lesion solitary, CT thereof   • Morbid obesity (CMS/HCC)    • Non-rheumatic mitral regurgitation    • NSTEMI (non-ST elevated myocardial infarction) (CMS/HCC) 11/2013   • Oral thrush    • HAYDEN  on CPAP     Followed by Dr. Reza   • Osteoarthritis    • Pneumonia     onset date: 01 Mar 2011, Severe pneumonia w mechanical ventilation, treated at [facility]. Patricia Roquezabeth   • Shortness of breath    • Sick sinus syndrome (CMS/HCC)     Severe bradycardia; status post pacemaker implantation January 2011   • Strong family history of breast cancer    • Type 2 diabetes mellitus (CMS/HCC)    • Ventral hernia    • Vitamin D deficiency        Past Surgical History:   Procedure Laterality Date   • AV NODE ABLATION  05/2012    Dr. Bear Rodrigues   • BACK SURGERY     • BREAST BIOPSY     • BREAST LUMPECTOMY     • BREAST SURGERY     • CARDIAC PACEMAKER PLACEMENT     • COLON SURGERY  01/27/2014    resection for colon cancer   • COLONOSCOPY  07/10/2015    normal ileum, liopmatous ileocecal valve, diverticulosis throughout entire colon, colonic ulceration, IH, mixed TA/hyperplastic polyps   • CORONARY ANGIOPLASTY WITH STENT PLACEMENT  11/2013    2.25×15 mm drug-eluting stent to the second diagonal of the LAD, 2.75×18 mm Xience drug-eluting stent to the mid LAD, and 3.5×23 mm Xience drug-eluting stent to the proximal LAD   • ENDOSCOPY  07/10/2015    erythematous mucosa in stomach, no gross lesions in duodenum, proximal white plaques   • HAND SURGERY     • HYSTERECTOMY     • TIBIA FRACTURE SURGERY      left   • TOE SURGERY      bilat all toenails removed x3 r/t fungus         Current Outpatient Medications:   •  albuterol (PROVENTIL) (2.5 MG/3ML) 0.083% nebulizer solution, INHALE THE CONTENTS OF ONE VIAL (3ML) EVERY 4 HOURS AS NEEDED, Disp: , Rfl: 5  •  allopurinol (ZYLOPRIM) 300 MG tablet, Take 1 tablet by mouth Daily., Disp: 90 tablet, Rfl: 3  •  atorvastatin (LIPITOR) 20 MG tablet, Take 1 tablet by mouth Daily., Disp: 90 tablet, Rfl: 1  •  BD PEN NEEDLE CHRIS U/F 32G X 4 MM misc, USE AS DIRECTED, Disp: 100 each, Rfl: 0  •  bumetanide (BUMEX) 1 MG tablet, TAKE ONE TABLET TWICE DAILY IF EDEMA WORSENS TAKE TWO TABLETS IN THE MORNING  AND TAKE ONE TABLET IN THE EVENING, Disp: , Rfl: 99  •  carvedilol (COREG) 25 MG tablet, TAKE ONE TABLET IN THE MORNING, 1/2 TABLET AT NOON AND 1/2 TABLET AT BEDTIME, Disp: 180 tablet, Rfl: 1  •  dabigatran etexilate (PRADAXA) 150 MG capsu, Take 1 capsule by mouth Every 12 (Twelve) Hours., Disp: 48 capsule, Rfl: 0  •  diphenhydrAMINE (BENADRYL) 25 MG tablet, Take 25 mg by mouth Every 6 (Six) Hours As Needed for Itching., Disp: , Rfl:   •  fluticasone (FLONASE) 50 MCG/ACT nasal spray, 2 sprays into each nostril Daily., Disp: , Rfl:   •  Fluticasone-Umeclidin-Vilant (TRELEGY ELLIPTA IN), Inhale Daily., Disp: , Rfl:   •  Insulin Glargine 300 UNIT/ML solution pen-injector, Inject 58 Units under the skin into the appropriate area as directed Daily., Disp: 3 pen, Rfl: 5  •  ipratropium-albuterol (DUO-NEB) 0.5-2.5 mg/mL nebulizer, INHALE THE contents OF ONE vial using nebulizer FOUR TIMES DAILY AS NEEDED, Disp: , Rfl: 5  •  levocetirizine (XYZAL) 5 MG tablet, Take 5 mg by mouth Every Evening., Disp: , Rfl:   •  Melatonin 10 MG capsule, Take 1 capsule by mouth every night., Disp: , Rfl:   •  nitroglycerin (NITROSTAT) 0.4 MG SL tablet, PLACE ONE TABLET UNDER TONGUE FOR CHEST PAIN.  MAY REPEAT EVERY 5 MINUTES FOR 3  DOSES, Disp: 25 tablet, Rfl: 0  •  omeprazole (priLOSEC) 40 MG capsule, Take 40 mg by mouth Daily., Disp: , Rfl:   •  potassium chloride (K-DUR,KLOR-CON) 20 MEQ CR tablet, Take 1 tablet by mouth Daily., Disp: 90 tablet, Rfl: 3  •  TRADJENTA 5 MG tablet tablet, Take 1 tablet by mouth Daily., Disp: 90 tablet, Rfl: 1  •  furosemide (LASIX) 40 MG tablet, TAKE 1 TABLET BY MOUTH AM AND 1 TABLET MID DAY AND 1/2 TABLET PM (Patient taking differently: Take 40 mg by mouth 3 (Three) Times a Day.), Disp: 225 tablet, Rfl: 2    Allergies   Allergen Reactions   • Codeine Itching     Edema   • Hydralazine Hcl      BP drops.   • Lisinopril Cough     Edema       Family History   Problem Relation Age of Onset   • Breast cancer  Mother 60   • Breast cancer Sister 50   • Arrhythmia Sister    • Hypertension Sister    • Brain cancer Father 38   • Breast cancer Sister 40   • Heart attack Brother    • Hypertension Brother    • Diabetes Son    • Hypertension Son    • Heart disease Maternal Grandmother        Social History     Socioeconomic History   • Marital status:      Spouse name: Not on file   • Number of children: Not on file   • Years of education: Not on file   • Highest education level: Not on file   Occupational History   • Occupation: Retired   Tobacco Use   • Smoking status: Former Smoker     Packs/day: 1.00     Years: 25.00     Pack years: 25.00     Last attempt to quit: 2004     Years since quitting: 15.3   • Smokeless tobacco: Never Used   • Tobacco comment: caffeine use   Substance and Sexual Activity   • Alcohol use: Yes     Comment: Rare   • Drug use: No   • Sexual activity: Defer       Review of Systems   All other systems reviewed and are negative.      Vitals:    04/29/19 1006   BP: 134/60   Temp: 98.8 °F (37.1 °C)       Physical Exam   Constitutional: She is oriented to person, place, and time. She appears well-developed and well-nourished. No distress.   HENT:   Head: Normocephalic and atraumatic. Hair is normal.   Right Ear: Hearing, tympanic membrane, external ear and ear canal normal. No drainage. No decreased hearing is noted.   Left Ear: Hearing, tympanic membrane, external ear and ear canal normal. No decreased hearing is noted.   Nose: No nasal deformity.   Mouth/Throat: Oropharynx is clear and moist.   Eyes: Conjunctivae, EOM and lids are normal. Pupils are equal, round, and reactive to light. Right eye exhibits no discharge. Left eye exhibits no discharge.   Neck: Normal range of motion. Neck supple. No JVD present. No tracheal deviation present. No thyromegaly present.   Cardiovascular: Normal rate, regular rhythm, normal heart sounds, intact distal pulses and normal pulses. Exam reveals no gallop and no  friction rub.   No murmur heard.  Pulmonary/Chest: Effort normal and breath sounds normal. No respiratory distress. She has no wheezes. She has no rales. She exhibits no tenderness.   Abdominal: Soft. Bowel sounds are normal. She exhibits no distension and no mass. There is no tenderness. There is no rebound and no guarding. No hernia.   Genitourinary: Rectal exam shows guaiac negative stool.   Musculoskeletal: Normal range of motion. She exhibits no edema, tenderness or deformity.   Lymphadenopathy:     She has no cervical adenopathy.   Neurological: She is alert and oriented to person, place, and time. She has normal reflexes. She displays normal reflexes. No cranial nerve deficit. She exhibits normal muscle tone. Coordination normal.   Skin: Skin is warm and dry. No rash noted. She is not diaphoretic. No erythema.   Psychiatric: She has a normal mood and affect. Her behavior is normal. Judgment and thought content normal.   Vitals reviewed.      Renuka was seen today for pre-op exam and anemia.    Diagnoses and all orders for this visit:    Anemia, unspecified type    Cancer of transverse colon (CMS/HCC)      Assessment:  1.personal history of colon cancer.  January 2014 Dr. Nathan Bernal resected a distal transverse colon/descending colon pT1, pN0 colon cancer.  2.Worsening anemia. She is heme negative today.   3.     Recommendations:  1. We discussed the pros and cons of doing a colonoscopy on this 77 yo with so many medical problems. Could she tolerate a colonoscopy prep. Would Anesthesia even put her to sleep as she requires O2 at 3-4 LPM continuously? She would rather not have a colonsocopy. I discussed with Dr. Hart.   2) I want her to think about colonoscopy or not.     Return if symptoms worsen or fail to improve.    Adrien Gautam MD  4/29/2019

## 2019-04-30 NOTE — PROGRESS NOTES
Date of Office Visit: 2019  Encounter Provider: Rhiannon Rios MD  Place of Service: [facility] CARDIOLOGY  Patient Name: Renuka Patel  :1940      Patient ID:  Renuka Patel is a 78 y.o. female is here for  followup for CAD and CMP and CHF.         History of Present Illness    I first saw her in 2010 for atrial fibrillation which was new  onset. We attempted a cardioversion but she developed severe bradycardia  after that, and required a pacemaker implantation which was performed on  2011. The second attempt to cardiovert her in 2011 was unsuccessful.    She has been on Coumadin and rate controlled since that time.    She was admitted to [facility] for acute respiratory  failure on 2011 requiring mechanical ventilation secondary to  severe pneumonia. She went into congestive heart failure and atrial  fibrillation with rapid ventricular response. The hospitalization was long  and complex.           On 2013, she had a colonoscopy with Dr. Gautam, which showed an ulcerated mass in the  sigmoid colon, which turned out to be positive for colon cancer.  After her colonoscopy then, on  2013, she presented to [facility] with prolonged shoulder  discomfort and wound up ruling in for a non-ST elevation myocardial infarction. She went  on for cardiac catheterization with Dr. Mayfield. This showed normal left main coronary  artery, normal non-dominant left circumflex vessel, 99% proximal LAD stenosis going into  the mid-LAD involving the origin of the second diagonal branch with a 75% ostial second  diagonal stenosis. The first diagonal branch was normal. The right coronary artery showed  50% at the posterolateral arteries. Her ejection fraction was reduced at 35-40%. She went  on to receive a 2.25 x 15 mm drug-eluting stent to the second diagonal branch of the left  anterior descending artery and then she  received a 2.75 x 18 mm long Xience drug-eluting  stent into the midleft anterior descending artery. Then she received a 3.5 x 23 mm long  Xience drug-eluting stent in the proximal left anterior descending artery.       She had an echocardiogram on 2013 which revealed an ejection  fraction of 53%, apical wall hypokinesis and moderate mitral insufficiency,  moderate-to-severe tricuspid insufficiency and grade III diastolic dysfunction. She also  had a PET stress study done on 2013 which revealed a moderate area of myocardial  infarction in the apical wall with no ischemia. There was apical akinesis and the  ejection fraction was 36%. She had a CT of the abdomen and pelvis on 2014 showing  no metastatic disease, sigmoid thickening with malignancy there and a 3.6 cm infrarenal  abdominal aortic aneurysm. She has an adrenal adenoma which really is unchanged.    Her  Claudio  around 2013 and this has been very  difficult for her.       She had her colon surgery which was done for colon cancer with Dr. Bernal 2014.  She sees Dr. Reza for her sleep apnea.       She has developed an abdominal wall hernia but it is not causing her any pain, so she is going to wear a binder for now.      She was admitted on 2016, with acute decompensating congestive heart failure, both systolic and diastolic in nature. She had an echocardiogram, which showed her ejection fraction had dropped to 32%. She was treated with IV diuretics. She was very weak and so physical therapy was also consulted on the case. She ruled out for myocardial infarction.  She had a PET stress test done 2016 showing ejection fraction of 37% and no evidence of ischemia.      She had an echocardiogram done 2018 showing ejection fraction 30% with anteroseptal, inferoseptal, apical, apical inferior and apical septal hypokinesis.  Left ventricle is mildly dilated with mild left ventricular hypertrophy and grade 3 fixed  restrictive diastolic function.  Left atrial moderate enlargement, moderate tricuspid insufficiency, mild mitral insufficiency.  There was a very small pericardial effusion.      she has been more short winded and fatigued.  She really does not have a big change in her edema but when she saw her nephrologist, they changed her to Bumex and she has had more diuretic response to that.  Dr. epstein said that he cannot give her erythropoietin because her hemoglobin is 10.1 so it is not low enough.  She does feel her heart having bursts of tachycardia palpitations.  She has had no falls, syncope.  She is watching salt and taking her medications as directed.  She is using oxygen 24 hours a day.  She saw Dr. Gautam and he does not want to do another colonoscopy even though it has been 5 years since her last one because her risk is high.  She has no exertional chest tightness or pressure.    Past Medical History:   Diagnosis Date   • Abdominal aortic aneurysm (CMS/HCC)     3.6 cm infrarenal January 2014   • Acute bronchitis    • Anemia     Secondary to stage 3 chronic kidney disease and hx of iron deficiency; followed by Dr. Manuel Epstein   • Anxiety    • Atrial fibrillation (CMS/HCC) 12/2010    Previous cardioversion; status post AV node ablation by Dr. Bear Rodrigues 5/2012   • Back pain    • Breast cancer (CMS/HCC)     Stage I, status post lumpectomy; history of radiation   • CAD (coronary artery disease)    • Cardiomyopathy (CMS/HCC)    • CHF (congestive heart failure) (CMS/HCC)     Acute on chronic systolic and diastolic; followed by Dr. Rhiannon Rios   • Chronic kidney disease     Followed by Dr. Anna Gerardo    • Colon cancer (CMS/HCC)     Stage I, resected 01/27/2014   • COPD (chronic obstructive pulmonary disease) (CMS/HCC)     Oxygen dependent; followed by Dr. Reza   • Esophageal reflux    • Hernia of anterior abdominal wall 3/11/2016   • Hyperlipidemia    • Hypertension    • Joint pain     IN THE RIGHT KNEE   •  Mass of adrenal gland (CMS/HCC)     lesion solitary, CT thereof   • Morbid obesity (CMS/HCC)    • Non-rheumatic mitral regurgitation    • NSTEMI (non-ST elevated myocardial infarction) (CMS/HCC) 11/2013   • Oral thrush    • HAYDEN on CPAP     Followed by Dr. Reza   • Osteoarthritis    • Pneumonia     onset date: 01 Mar 2011, Severe pneumonia w mechanical ventilation, [facility] at [facility]. Patricia Britton   • Shortness of breath    • Sick sinus syndrome (CMS/HCC)     Severe bradycardia; status post pacemaker implantation January 2011   • Strong family history of breast cancer    • Type 2 diabetes mellitus (CMS/HCC)    • Ventral hernia    • Vitamin D deficiency          Past Surgical History:   Procedure Laterality Date   • AV NODE ABLATION  05/2012    Dr. Bear Rodrigues   • BACK SURGERY     • BREAST BIOPSY     • BREAST LUMPECTOMY     • BREAST SURGERY     • CARDIAC PACEMAKER PLACEMENT     • COLON SURGERY  01/27/2014    resection for colon cancer   • COLONOSCOPY  07/10/2015    normal ileum, liopmatous ileocecal valve, diverticulosis throughout entire colon, colonic ulceration, IH, mixed TA/hyperplastic polyps   • CORONARY ANGIOPLASTY WITH STENT PLACEMENT  11/2013    2.25×15 mm drug-eluting stent to the second diagonal of the LAD, 2.75×18 mm Xience drug-eluting stent to the mid LAD, and 3.5×23 mm Xience drug-eluting stent to the proximal LAD   • ENDOSCOPY  07/10/2015    erythematous mucosa in stomach, no gross lesions in duodenum, proximal white plaques   • HAND SURGERY     • HYSTERECTOMY     • TIBIA FRACTURE SURGERY      left   • TOE SURGERY      bilat all toenails removed x3 r/t fungus       Current Outpatient Medications on File Prior to Visit   Medication Sig Dispense Refill   • albuterol (PROVENTIL) (2.5 MG/3ML) 0.083% nebulizer solution INHALE THE CONTENTS OF ONE VIAL (3ML) EVERY 4 HOURS AS NEEDED  5   • allopurinol (ZYLOPRIM) 300 MG tablet Take 1 tablet by mouth Daily. 90 tablet 3   • atorvastatin (LIPITOR) 20 MG tablet  Take 1 tablet by mouth Daily. 90 tablet 1   • BD PEN NEEDLE CHRIS U/F 32G X 4 MM misc USE AS DIRECTED 100 each 0   • bumetanide (BUMEX) 1 MG tablet TAKE ONE TABLET TWICE DAILY IF EDEMA WORSENS TAKE TWO TABLETS IN THE MORNING AND TAKE ONE TABLET IN THE EVENING  99   • carvedilol (COREG) 25 MG tablet TAKE ONE TABLET IN THE MORNING, 1/2 TABLET AT NOON AND 1/2 TABLET AT BEDTIME 180 tablet 1   • dabigatran etexilate (PRADAXA) 150 MG capsu Take 1 capsule by mouth Every 12 (Twelve) Hours. 48 capsule 0   • diphenhydrAMINE (BENADRYL) 25 MG tablet Take 25 mg by mouth Every 6 (Six) Hours As Needed for Itching.     • fluticasone (FLONASE) 50 MCG/ACT nasal spray 2 sprays into each nostril Daily.     • Fluticasone-Umeclidin-Vilant (TRELEGY ELLIPTA IN) Inhale Daily.     • Insulin Glargine 300 UNIT/ML solution pen-injector Inject 58 Units under the skin into the appropriate area as directed Daily. 3 pen 5   • ipratropium-albuterol (DUO-NEB) 0.5-2.5 mg/mL nebulizer INHALE THE contents OF ONE vial using nebulizer FOUR TIMES DAILY AS NEEDED  5   • levocetirizine (XYZAL) 5 MG tablet Take 5 mg by mouth Every Evening.     • Melatonin 10 MG capsule Take 1 capsule by mouth every night.     • nitroglycerin (NITROSTAT) 0.4 MG SL tablet PLACE ONE TABLET UNDER TONGUE FOR CHEST PAIN.  MAY REPEAT EVERY 5 MINUTES FOR 3  DOSES 25 tablet 0   • omeprazole (priLOSEC) 40 MG capsule Take 40 mg by mouth Daily.     • potassium chloride (K-DUR,KLOR-CON) 20 MEQ CR tablet Take 1 tablet by mouth Daily. 90 tablet 3   • TRADJENTA 5 MG tablet tablet Take 1 tablet by mouth Daily. 90 tablet 1   • furosemide (LASIX) 40 MG tablet TAKE 1 TABLET BY MOUTH AM AND 1 TABLET MID DAY AND 1/2 TABLET PM (Patient taking differently: Take 40 mg by mouth 3 (Three) Times a Day.) 225 tablet 2     No current facility-administered medications on file prior to visit.        Social History     Socioeconomic History   • Marital status:      Spouse name: Not on file   • Number of  "children: Not on file   • Years of education: Not on file   • Highest education level: Not on file   Occupational History   • Occupation: Retired   Tobacco Use   • Smoking status: Former Smoker     Packs/day: 1.00     Years: 25.00     Pack years: 25.00     Last attempt to quit: 2004     Years since quitting: 15.3   • Smokeless tobacco: Never Used   • Tobacco comment: caffeine use   Substance and Sexual Activity   • Alcohol use: Yes     Comment: Rare   • Drug use: No   • Sexual activity: Defer           Review of Systems   Constitution: Positive for malaise/fatigue.   HENT: Negative for congestion.    Eyes: Negative for vision loss in left eye and vision loss in right eye.   Cardiovascular: Positive for leg swelling.   Respiratory: Positive for shortness of breath. Negative for cough, hemoptysis, sleep disturbances due to breathing, snoring, sputum production and wheezing.    Endocrine: Negative.    Hematologic/Lymphatic: Negative.    Skin: Negative for poor wound healing and rash.   Musculoskeletal: Negative for falls, gout, muscle cramps and myalgias.   Gastrointestinal: Negative for abdominal pain, diarrhea, dysphagia, hematemesis, melena, nausea and vomiting.   Neurological: Negative for excessive daytime sleepiness, dizziness, headaches, light-headedness, loss of balance, seizures and vertigo.   Psychiatric/Behavioral: Negative for depression and substance abuse. The patient is not nervous/anxious.        Procedures  Procedures        Objective:      Vitals:    04/30/19 1304   BP: 126/80   BP Location: Left arm   Patient Position: Sitting   Pulse: 71   Weight: 118 kg (260 lb 12.8 oz)   Height: 162.6 cm (64\")     Body mass index is 44.77 kg/m².    Physical Exam   Constitutional: She is oriented to person, place, and time. She appears well-developed and well-nourished. No distress.   obese   HENT:   Head: Normocephalic and atraumatic.   Eyes: Conjunctivae are normal. No scleral icterus.   Neck: Neck supple. No JVD " present. Carotid bruit is not present. No thyromegaly present.   Cardiovascular: Normal rate, regular rhythm, S1 normal, S2 normal, normal heart sounds and intact distal pulses.  No extrasystoles are present. PMI is not displaced.   No murmur heard.  Pulses:       Carotid pulses are 2+ on the right side, and 2+ on the left side.       Radial pulses are 2+ on the right side, and 2+ on the left side.        Dorsalis pedis pulses are 2+ on the right side, and 2+ on the left side.        Posterior tibial pulses are 2+ on the right side, and 2+ on the left side.   2+ LLE edema   Pulmonary/Chest: Effort normal. No respiratory distress. She has decreased breath sounds. She has no wheezes. She has no rhonchi. She has no rales. She exhibits no tenderness.   Abdominal: Soft. Bowel sounds are normal. She exhibits no distension, no abdominal bruit and no mass. There is no tenderness.   Musculoskeletal: She exhibits no edema or deformity.   Lymphadenopathy:     She has no cervical adenopathy.   Neurological: She is alert and oriented to person, place, and time. No cranial nerve deficit.   Skin: Skin is warm and dry. No rash noted. She is not diaphoretic. No cyanosis. No pallor. Nails show no clubbing.   Psychiatric: She has a normal mood and affect. Judgment normal.   Vitals reviewed.      Lab Review:       Assessment:      Diagnosis Plan   1. Chronic coronary artery disease     2. Permanent atrial fibrillation (CMS/HCC)     3. Ischemic cardiomyopathy     4. Non-rheumatic mitral regurgitation     5. HAYDEN (obstructive sleep apnea)     6. Chronic obstructive pulmonary disease, unspecified COPD type (CMS/HCC)     7. Essential hypertension     8. Shortness of breath     9. H/O cardiac pacemaker       1. History of congestive heart failure due to hypertension with ischemic cardiomyopathy.     3. Hypertension. Blood pressure is well controlled  4. Adrenal mass, 3.5 cm, followed by Dr. Felton.    5. Small abdominal aortic aneurysm, 3.6  cm by CTA 01/20/2014.    6. Chronic atrial fibrillation status post AV node ablation with pacemaker. Stay on Pradaxa 150 mg twice daily with food.  7. Diabetes mellitus type 2. Followed by Dr. Balbuena.  8. Morbid obesity. Is trying to lose weight.  9. Gastroesophageal reflux disease.    10. Chronic obstructive pulmonary disease.    11. History of breast cancer with lumpectomy on the right breast.    12. Obstructive sleep apnea on CPAP.    13. Anxiety.    14. Dizziness worse with anemia.  15. High social stressors.    16. Colon cancer. Dr. Joaquim Bernal colectomy surgery 01/27/2014.    17. NSTEMI 11/17/13 status post LAD and 2nd diagonal stents  at [facility] with Dr. Mayfield. Echocardiogram showed LvEF 30% on 7/2018.   18. Anemia, sees Dr. Hart. Her upper and lower endoscopy with Dr. Bernal in 2015 were normal. This is under better control now.   19. CKD, sees Chata Gerardo.   20. Ischemic cardiomyopathy.           Plan:       Pacer check today, see melisa in 3 months.  No med changes.  Doing slightly better on Bumex.     Coronary Artery Disease  Assessment  • The patient has no angina    Subjective - Objective  • There has been a previous stent procedure using SERGIO        Heart Failure  Assessment  • NYHA class III-B - There is significant limitation of physical activity. The patient is comfortable at rest, but minimal activity causes fatigue, palpitations or shortness of breath.  • Left ventricular function is moderately reduced by qualitative assessment    Plan  • The heart failure care plan was discussed with the patient today including: continuing the current program    Subjective/Objective  • The patient reports dyspnea  • Physical exam findings positive for peripheral edema.   • Physical exam findings negative for rales and elevated JVP.

## 2019-05-23 NOTE — TELEPHONE ENCOUNTER
Spoke with pt and informed her of med change. She expressed understanding     ----- Message from REGAN Conway sent at 5/23/2019  8:48 AM EDT -----  Contact: pt  Decrease toujeo to 55 units once daily. Eat a small bedtime snack. If blood sugars drop below 70 let office know  ----- Message -----  From: Keya Cantu MA  Sent: 5/22/2019   3:20 PM  To: REGAN Conway     On your desk.  ----- Message -----  From: Nena Clark MA  Sent: 5/22/2019   2:43 PM  To: Keya Cantu MA    Pt called c/o that for the last 3 days her b/s has been low in the mornings, and think her diabetic meds need to be cut back. P t request call back about what Mary Ann reply

## 2019-05-24 NOTE — PROGRESS NOTES
Pt brought back to chemo for private room r/t reports of witnessed bed bugs at prior visit.  Pt is not sure why she is coming back to chemo and had lots of questions.  Once in room I examined pt, no signs of bed bugs on pt or daughter  Informed Magdaleno of no need for private room    Pt reports feeling like someone sucked the life out of her, she is more SOA, has increased her O2 to 4L from 3L.  Denies fever or chills.  Pt to receive procrit today for hgh 9.9

## 2019-06-06 NOTE — TELEPHONE ENCOUNTER
06/06/19  1:00 PM  Renuka Patel  1940  Home Phone [phone]       Renuka Patel is a patient of Dr. Rios. She is calling in w/c/o mario LE edema, feet and ankles, w/2 places that are weeping clear liquid.    Current BP/HR are 134/79, 71.     Current cardiac meds are:    Bumetanide 2mg in am and 1 in the pm (she has done this X 2 days now)  Dabigatran etexilate 150mg BID  Atorvastatin 20mg daily  Carvedilol 25mg in am, 12.5mg noon/evening  Potassium chloride 20mEq daily    Dr. Rios: I told her that we typically send these pts w/weeping LE to the ER. She does not want to go, of course. How do you wish to proceed w/this pt?    Flor Heaton  Triage RN

## 2019-06-06 NOTE — TELEPHONE ENCOUNTER
Pt notified, verbalized understanding. Rosanne was booked. Jo Bray had an opening at 2:30pm. Put her in there, and called medical records.    Flor Heaton  Triage RN

## 2019-06-06 NOTE — TELEPHONE ENCOUNTER
Per your request, I am trying to reschedule. S/w pt and she has to talk to her daughter and see if she can do it. She may not be able to.    Flor Heaton  Triage RN

## 2019-06-07 NOTE — NURSING NOTE
Spoke with REGAN Alexis and given CXR results as read by Dr. Anderson. Patient instructed per Jo to continue bumex as talked about and keep appt with Regan Fernández and call office for any issues.

## 2019-06-07 NOTE — TELEPHONE ENCOUNTER
06/07/19  1:43 PM      Spoke with patient's daughter, Charissa who accompanied her to office today.  Advised her to continue Bumex 3 times a day for 3 days then decrease to twice daily.  Recommended that she follow-up with her primary care physician in regards to leukocytosis.  Patient will call the office with any new or worsening symptoms.    REGAN Alexis  [facility]

## 2019-06-07 NOTE — TELEPHONE ENCOUNTER
----- Message from Anna Coy RN sent at 6/7/2019 10:45 AM EDT -----  Can cancel today's procrit and lab appt- She had labs drawn at [facility] and her hgb is ok and does not need procrit.  However, they were not able to draw all of the labs ordered.  Can you please change her vitals only appt on 6/17 to lab for cbc and retic.    Thank you! Call with questions.

## 2019-06-07 NOTE — PROGRESS NOTES
Pt was advised to continue Bumex 2mg TID for 3 additional days, decrease to BID.  Keep appt with REGAN Fernández in July.  Call for new or worsening symptoms.

## 2019-06-07 NOTE — PROGRESS NOTES
Rec'd call from [facility] nurse, stating pt in their office and darcie labs, they were going to try to add on the additional labs ordered (retic, ferritin, iron profile) but unfortunately unable to do so. pt is scheduled for procrit in our office today. Her cbc today revealed a hgb of 10.6 therefore not requiring procrit today.  I offered pt to still come in today to have those labs drawn, or she can have them drawn the day of her MD appt on 6/17.  Pt prefers not to come in today and have labs drawn on the 17th.  Message sent to appt desk to cancel procrit appt today and add lab appt on for the 17th.

## 2019-06-07 NOTE — PROGRESS NOTES
[facility]      Patient Name: Renuka Patel  :1940  Age: 79 y.o.  Primary Cardiologist: Rhiannon Rios MD  Encounter Provider:  REGAN Castelan      Chief Complaint:   Chief Complaint   Patient presents with   • Follow-up     edema Weeping         HPI  Renuka Patel is a 79 y.o. female who presents today for evaluation of pedal edema. Pt has a  history significant for atrial fibrillation, combined heart failure, hypertension, hyperlipidemia, moderate mitral valve regurgitation, AAA, obstructive sleep apnea, COPD, type 2 diabetes, stage III renal disease, permanent pacemaker presents.  Patient called the office yesterday stating she was having problems with bilateral lower extremity edema as well as weeping.    Patient states that she has had increase in her lower extremity edema.  She states that her legs were weeping yesterday and she has developed a rash to her bilateral lower extremities.  She reports chronic shortness of breath and shortness of breath with exertion.  She states that over the past 2 to 3 days it has worsened.  She also reports increased fatigue.  She states that yesterday she took Bumex 2 mg 3 times daily as recommended by Dr. Rios.  She denies any chest pain, palpitations, lightheadedness.  [facility].  If hemoglobin is less than 10 she is to receive a Procrit injection.    The following portions of the patient's history were reviewed and updated as appropriate: allergies, current medications, past family history, past medical history, past social history, past surgical history and problem list.      Review of Systems   Constitution: Positive for malaise/fatigue.   Cardiovascular: Positive for dyspnea on exertion and leg swelling. Negative for chest pain.   Respiratory: Positive for shortness of breath.    Skin: Positive for rash.   Neurological: Negative for light-headedness.  "  All other systems reviewed and are negative.      OBJECTIVE:   Vital Signs  Vitals:    06/07/19 0930   BP: 134/72   Pulse: 70   SpO2: 92%     Estimated body mass index is 44.29 kg/m² as calculated from the following:    Height as of this encounter: 162.6 cm (64\").    Weight as of this encounter: 117 kg (258 lb).    Physical Exam   Constitutional: She is oriented to person, place, and time. Vital signs are normal. She appears well-developed and well-nourished. She is active.   Eyes: Conjunctivae are normal.   Neck: Carotid bruit is not present.   Cardiovascular: Normal rate, regular rhythm and normal heart sounds.   Pulmonary/Chest: Breath sounds normal.   Abdominal: Normal appearance.   Musculoskeletal:   2+ bilateral lower extremity edema that is pitting.   Neurological: She is alert and oriented to person, place, and time. GCS eye subscore is 4. GCS verbal subscore is 5. GCS motor subscore is 6.   Skin: Skin is warm, dry and intact.   Psychiatric: She has a normal mood and affect. Her speech is normal and behavior is normal. Judgment and thought content normal. Cognition and memory are normal.       Procedures    Cardiac Procedures:  1. Renal ultrasound 11/25/2015.  2 left renal cyst measuring 1.7 cm and 1.8 cm in greatest dimension.  4.6 cm solid mass at the superior pole of the left kidney that is likely external to the kidney and represents known left adrenal mass.  2. Echocardiogram 8/24/2016.  EF 31.7%.  Global left ventricular wall motion appears abnormal.  The LV cavity is moderately dilated.  Unable to assess LV diastolic function.  Elevated left atrial pressure.  Moderate mitral valve regurgitation.  Moderate tricuspid valve regurgitation.  Small to moderate sized posterior pericardial effusion.  3. Myocardial perfusion stress PET 9/8/2016.  EF 28%.  EF was stress 37%.  Findings consistent with nonischemic cardiomyopathy.  Impressions consistent with lower study.  Normal myocardial perfusion study " without evidence of ischemia.  4. Echocardiogram 7/23/2018.  EF 30%.  Abnormal left ventricular wall motion.  See scoring diagram for details.  The LV cavity is mildly dilated.  Mild LVH.  Grade 3 diastolic dysfunction.  Left atrial volume is moderately increased.  Mild calcification of aortic valve.  Mild MAC is present.  Mild mitral valve regurgitation.  Moderate tricuspid valve regurgitation.  Estimated RVSP 35-45 mmHg.        ASSESSMENT:      Diagnosis Plan   1. ACUÑA (dyspnea on exertion)  Comprehensive Metabolic Panel    BNP   2. Pedal edema  Comprehensive Metabolic Panel    BNP   3. Acute on chronic combined systolic and diastolic congestive heart failure (CMS/HCC)  Comprehensive Metabolic Panel    BNP   4. Dilated cardiomyopathy (CMS/HCC)   BNP         PLAN OF CARE:     Patient presents today for increased dyspnea with exertion, increased fatigue, increased pedal edema.  She has a history of atrial fibrillation with permanent pacemaker present.  Patient also has a history of combined systolic and diastolic heart failure.  Last echocardiogram in July 2018 revealed EF of 30% with grade 3 diastolic dysfunction.  Patient states that after increasing Bumex yesterday her swelling and weight has improved, however she is still very short of breath with exertion and has increased fatigue.  I think the patient would benefit from IV Bumex.  [place] [facility] [place], BNP and to give IV Bumex 2 mg x 1 dose.  Patient will keep follow-up appointment with REGAN Fernández 7/30/2019.  She will return to the office sooner for any problems or complications.           Discharge Medications           Accurate as of 6/7/19  9:45 AM. If you have any questions, ask your nurse or doctor.               Continue These Medications      Instructions Start Date   albuterol (2.5 MG/3ML) 0.083% nebulizer solution  Commonly known as:  PROVENTIL   INHALE THE CONTENTS OF ONE VIAL (3ML) EVERY 4 HOURS AS NEEDED       allopurinol 300 MG tablet  Commonly known as:  ZYLOPRIM   300 mg, Oral, Daily      atorvastatin 20 MG tablet  Commonly known as:  LIPITOR   20 mg, Oral, Daily      BENADRYL 25 MG tablet  Generic drug:  diphenhydrAMINE   25 mg, Oral, Every 6 Hours PRN      bumetanide 1 MG tablet  Commonly known as:  BUMEX   TAKE ONE TABLET TWICE DAILY IF EDEMA WORSENS TAKE TWO TABLETS IN THE MORNING AND TAKE ONE TABLET IN THE EVENING      carvedilol 25 MG tablet  Commonly known as:  COREG   TAKE ONE TABLET IN THE MORNING, 1/2 TABLET AT NOON AND 1/2 TABLET AT BEDTIME      dabigatran etexilate 150 MG capsu  Commonly known as:  PRADAXA   150 mg, Oral, Every 12 Hours Scheduled      fluticasone 50 MCG/ACT nasal spray  Commonly known as:  FLONASE   2 sprays, Nasal, Daily      GLOBAL EASE INJECT PEN NEEDLES 32G X 4 MM misc  Generic drug:  Insulin Pen Needle   USE AS DIRECTED      Insulin Glargine 300 UNIT/ML solution pen-injector   55 Units, Subcutaneous, Daily      ipratropium-albuterol 0.5-2.5 mg/3 ml nebulizer  Commonly known as:  DUO-NEB   INHALE THE contents OF ONE vial using nebulizer FOUR TIMES DAILY AS NEEDED      levocetirizine 5 MG tablet  Commonly known as:  XYZAL   5 mg, Oral, Every Evening      Melatonin 10 MG capsule   1 capsule, Oral, Nightly      nitroglycerin 0.4 MG SL tablet  Commonly known as:  NITROSTAT   PLACE ONE TABLET UNDER TONGUE FOR CHEST PAIN.  MAY REPEAT EVERY 5 MINUTES FOR 3  DOSES      omeprazole 40 MG capsule  Commonly known as:  priLOSEC   40 mg, Oral, Daily      potassium chloride 20 MEQ CR tablet  Commonly known as:  K-DUR,KLOR-CON   20 mEq, Oral, Daily      TRADJENTA 5 MG tablet tablet  Generic drug:  linagliptin   5 mg, Oral, Daily      TRELEGY ELLIPTA IN   Inhalation, Daily             Thank you for allowing me to participate in the care of your patient,      Sincerely,   REGAN Castelan  [facility]  06/07/19  9:45 AM     06/07/19  11:37 AM    Laboratory studies have been  reviewed.  Patient's white blood cell count is trending upward.  She does have a productive sputum cough.  Agents BNP is also elevated.  Will send patient for stat chest x-ray to hold and call patient report.  Discussed the case with Dr. Rios who agrees.  It is possible that patient will require hospitalization.    LABORATORY FINDINGS:     Results Review:    Results from last 7 days   Lab Units 06/07/19  1007   SODIUM mmol/L 143   POTASSIUM mmol/L 4.0   CHLORIDE mmol/L 94*   CO2 mmol/L 33.9*   BUN mg/dL 24*   CREATININE mg/dL 1.04*   GLUCOSE mg/dL 174*   CALCIUM mg/dL 8.8         Results from last 7 days   Lab Units 06/07/19  1007   WBC 10*3/mm3 16.15*   HEMOGLOBIN g/dL 10.6*   HEMATOCRIT % 35.2   PLATELETS 10*3/mm3 228                         Lab Results   Component Value Date     (H) 05/02/2019    BUN 24 (H) 06/07/2019    CREATININE 1.04 (H) 06/07/2019    EGFRIFNONA 51 (L) 06/07/2019    EGFRIFAFRI 56 (L) 05/02/2019    EGFRIFAFRI  08/27/2016      Comment:      <15 Indicative of kidney failure.     06/07/2019    K 4.0 06/07/2019    CL 94 (L) 06/07/2019    CO2 33.9 (H) 06/07/2019    CALCIUM 8.8 06/07/2019    ALBUMIN 3.80 06/07/2019    LABGLOBREF 2.5 05/02/2019    BILITOT 0.6 06/07/2019    ALKPHOS 526 (H) 06/07/2019    AST 55 (H) 06/07/2019    ALT 27 06/07/2019    CHLPL 102 05/02/2019    TRIG 89 05/02/2019    TRIG 114 05/14/2018    HDL 55 05/02/2019    HDL 54 05/14/2018    VLDL 17.8 05/02/2019    VLDL 22.8 05/14/2018    LDL 29 05/02/2019    LDL 52 05/14/2018    LDLHDL 0.97 05/14/2018    HGBA1C 7.60 (H) 05/02/2019    HGBA1C 7.30 (H) 08/25/2016    MICROALBUR 31.4 12/17/2018    WBC 16.15 (H) 06/07/2019    RBC 3.58 (L) 06/07/2019    HCT 35.2 06/07/2019    MCV 98.3 (H) 06/07/2019    MCH 29.6 06/07/2019    MCHC 30.1 (L) 06/07/2019    RDW 17.0 (H) 06/07/2019    TSH 2.890 06/25/2018          **Nichole Disclaimer:**  Much of this encounter note is an electronic transcription/translation of spoken language to  printed text. The electronic translation of spoken language may permit erroneous, or at times, nonsensical words or phrases to be inadvertently transcribed. Although I have reviewed the note for such errors, some may still exist.

## 2019-06-17 NOTE — PROGRESS NOTES
Subjective .     REASONS FOR FOLLOWUP:  Anemia, cancer    HISTORY OF PRESENT ILLNESS:  The patient is a 79 y.o. year old female  who is here for follow-up with the above-mentioned history.    Worsening shortness of breath.  Previously was on 3 L nasal cannula oxygen as needed.  Now, on 4 L O2 continuously since around March 2019.  Has had issues with CHF exacerbations as well.    No problems with bleeding.    Past Medical History:   Diagnosis Date   • Abdominal aortic aneurysm (CMS/HCC)     3.6 cm infrarenal January 2014   • Acute bronchitis    • Anemia     Secondary to stage 3 chronic kidney disease and hx of iron deficiency; followed by Dr. Manuel Hart   • Anxiety    • Atrial fibrillation (CMS/HCC) 12/2010    Previous cardioversion; status post AV node ablation by Dr. Bear Rodrigues 5/2012   • Back pain    • Breast cancer (CMS/HCC)     Stage I, status post lumpectomy; history of radiation   • CAD (coronary artery disease)    • Cardiomyopathy (CMS/HCC)    • CHF (congestive heart failure) (CMS/HCC)     Acute on chronic systolic and diastolic; followed by Dr. Rhiannon Rios   • Chronic kidney disease     Followed by Dr. Anna Gerardo    • Colon cancer (CMS/HCC)     Stage I, resected 01/27/2014   • COPD (chronic obstructive pulmonary disease) (CMS/HCC)     Oxygen dependent; followed by Dr. Reza   • Esophageal reflux    • Hernia of anterior abdominal wall 3/11/2016   • Hyperlipidemia    • Hypertension    • Joint pain     IN THE RIGHT KNEE   • Mass of adrenal gland (CMS/HCC)     lesion solitary, CT thereof   • Morbid obesity (CMS/HCC)    • Non-rheumatic mitral regurgitation    • NSTEMI (non-ST elevated myocardial infarction) (CMS/HCC) 11/2013   • Oral thrush    • HAYDEN on CPAP     Followed by Dr. Reza   • Osteoarthritis    • Pneumonia     onset date: 01 Mar 2011, Severe pneumonia w mechanical ventilation, [facility] at [facility]. Patricia Britton   • Shortness of breath    • Sick sinus syndrome (CMS/HCC)     Severe  bradycardia; status post pacemaker implantation January 2011   • Strong family history of breast cancer    • Type 2 diabetes mellitus (CMS/HCC)    • Ventral hernia    • Vitamin D deficiency        HEMATOLOGIC/ONCOLOGIC HISTORY:  (History from previous dates can be found in the separate document.)    MEDICATIONS    Current Outpatient Medications:   •  albuterol (PROVENTIL) (2.5 MG/3ML) 0.083% nebulizer solution, INHALE THE CONTENTS OF ONE VIAL (3ML) EVERY 4 HOURS AS NEEDED, Disp: , Rfl: 5  •  allopurinol (ZYLOPRIM) 300 MG tablet, Take 1 tablet by mouth Daily., Disp: 90 tablet, Rfl: 3  •  atorvastatin (LIPITOR) 20 MG tablet, Take 1 tablet by mouth Daily., Disp: 90 tablet, Rfl: 1  •  bumetanide (BUMEX) 1 MG tablet, TAKE ONE TABLET TWICE DAILY IF EDEMA WORSENS TAKE TWO TABLETS IN THE MORNING AND TAKE ONE TABLET IN THE EVENING, Disp: , Rfl: 99  •  carvedilol (COREG) 25 MG tablet, TAKE ONE TABLET BY MOUTH IN THE MORNING, TAKE 1/2 TABLET AT NOON AND AT BEDTIME, Disp: 180 tablet, Rfl: 1  •  dabigatran etexilate (PRADAXA) 150 MG capsu, Take 1 capsule by mouth Every 12 (Twelve) Hours., Disp: 48 capsule, Rfl: 0  •  diphenhydrAMINE (BENADRYL) 25 MG tablet, Take 25 mg by mouth Every 6 (Six) Hours As Needed for Itching., Disp: , Rfl:   •  fluticasone (FLONASE) 50 MCG/ACT nasal spray, 2 sprays into each nostril Daily., Disp: , Rfl:   •  Fluticasone-Umeclidin-Vilant (TRELEGY ELLIPTA IN), Inhale Daily., Disp: , Rfl:   •  GLOBAL EASE INJECT PEN NEEDLES 32G X 4 MM misc, USE AS DIRECTED, Disp: 100 each, Rfl: 0  •  Insulin Glargine 300 UNIT/ML solution pen-injector, Inject 55 Units under the skin into the appropriate area as directed Daily., Disp: 3 pen, Rfl: 5  •  ipratropium-albuterol (DUO-NEB) 0.5-2.5 mg/mL nebulizer, INHALE THE contents OF ONE vial using nebulizer FOUR TIMES DAILY AS NEEDED, Disp: , Rfl: 5  •  levocetirizine (XYZAL) 5 MG tablet, Take 5 mg by mouth Every Evening., Disp: , Rfl:   •  Melatonin 10 MG capsule, Take 1  capsule by mouth every night., Disp: , Rfl:   •  nitroglycerin (NITROSTAT) 0.4 MG SL tablet, PLACE ONE TABLET UNDER TONGUE FOR CHEST PAIN.  MAY REPEAT EVERY 5 MINUTES FOR 3  DOSES, Disp: 25 tablet, Rfl: 0  •  omeprazole (priLOSEC) 40 MG capsule, Take 40 mg by mouth Daily., Disp: , Rfl:   •  potassium chloride (K-DUR,KLOR-CON) 20 MEQ CR tablet, Take 1 tablet by mouth Daily., Disp: 90 tablet, Rfl: 3  •  TRADJENTA 5 MG tablet tablet, Take 1 tablet by mouth Daily., Disp: 90 tablet, Rfl: 1    ALLERGIES:     Allergies   Allergen Reactions   • Codeine Itching     Edema   • Hydralazine Hcl      BP drops.   • Lisinopril Cough     Edema       SOCIAL HISTORY:       Social History     Socioeconomic History   • Marital status:      Spouse name: Not on file   • Number of children: Not on file   • Years of education: Not on file   • Highest education level: Not on file   Occupational History   • Occupation: Retired   Tobacco Use   • Smoking status: Former Smoker     Packs/day: 1.00     Years: 25.00     Pack years: 25.00     Last attempt to quit: 2004     Years since quitting: 15.4   • Smokeless tobacco: Never Used   • Tobacco comment: caffeine use   Substance and Sexual Activity   • Alcohol use: Yes     Comment: Rare   • Drug use: No   • Sexual activity: Defer         FAMILY HISTORY:  Family History   Problem Relation Age of Onset   • Breast cancer Mother 60   • Breast cancer Sister 50   • Arrhythmia Sister    • Hypertension Sister    • Brain cancer Father 38   • Breast cancer Sister 40   • Heart attack Brother    • Hypertension Brother    • Diabetes Son    • Hypertension Son    • Heart disease Maternal Grandmother        REVIEW OF SYSTEMS:  Review of Systems   Constitutional: Negative for activity change.   HENT: Negative for nosebleeds and trouble swallowing.    Respiratory: Positive for shortness of breath. Negative for wheezing.    Cardiovascular: Negative for chest pain and palpitations.   Gastrointestinal: Negative  for constipation, diarrhea and nausea.   Genitourinary: Negative for dysuria and hematuria.   Musculoskeletal: Negative for arthralgias and myalgias.   Skin: Negative for rash and wound.   Neurological: Negative for seizures and syncope.   Hematological: Negative for adenopathy. Does not bruise/bleed easily.   Psychiatric/Behavioral: Negative for confusion.        Objective    Vitals:    06/17/19 1437   BP: 134/83   Pulse: 70   Resp: 16   Temp: 98 °F (36.7 °C)   SpO2: 97%   Weight: Comment: pt. unable to stand   Height: Comment: pt. unable to stand for a new ht.   PainSc:   4   PainLoc: Comment: joints     Current Status 6/17/2019   ECOG score 1      PHYSICAL EXAM:    CONSTITUTIONAL:  Vital signs reviewed.  No distress, looks comfortable.  overweight.  In a wheelchair, nasal cannula oxygen.  EYES:  Conjunctiva and lids unremarkable.  PERRLA  EARS,NOSE,MOUTH,THROAT:  Ears and nose appear unremarkable.  Lips, teeth, gums appear unremarkable.  RESPIRATORY:  Normal respiratory effort.  Lungs clear to auscultation bilaterally.  CARDIOVASCULAR:  Normal S1, S2.  No murmurs rubs or gallops.  No significant lower extremity edema.  GASTROINTESTINAL: Abdomen appears unremarkable.  Nontender.  No hepatomegaly.  No splenomegaly.  LYMPHATIC:  No cervical, supraclavicular, axillary lymphadenopathy.  SKIN:  Warm.  No rashes.  PSYCHIATRIC:  Normal judgment and insight.  Normal mood and affect.    RECENT LABS:        WBC   Date/Time Value Ref Range Status   06/17/2019 02:29 PM 17.57 (H) 3.40 - 10.80 10*3/mm3 Final     Hemoglobin   Date/Time Value Ref Range Status   06/17/2019 02:29 PM 10.0 (L) 12.0 - 15.9 g/dL Final     Platelets   Date/Time Value Ref Range Status   06/17/2019 02:29  140 - 450 10*3/mm3 Final       Assessment/Plan   Iron deficiency anemia refractory to iron therapy  - CBC & Differential  - CBC & Differential  - CBC & Differential  - Ferritin  - Iron Profile  - Retic With IRF & RET-He      ASSESSMENT:  1.  Iron  deficiency anemia. Intolerant and does not respond to oral iron, has used Feraheme. She follows with Dr. Gautam   Recent iron labs normal.    2. Anemia secondary to stage 3 chronic kidney disease. Procrit started 11/03/2015.   Had a long break from needing Procrit.  On 5/24/2019, received Procrit for Hb 9.9 and worsening SOA (SOA thought to mostly be related to COPD and CHF)    3. Stage I breast cancer. She completed 5 years of tamoxifen in 2005.   Last mammogram 9/21/17, BIRADS 2.   No clinical signs of recurrence.  Remission continues.    4. Stage I colon adenocarcinom a, resected 01/27/2014. No adjuvant chemotherapy indicated.   She states her last colonoscopy was in 2014.  Referred back to Dr. Gautam.  She states Dr. Gautam recommended not doing a follow-up colonoscopy considering her poor pulmonary function.    5. Difficult IV stick. She states she has lots of issues with this. I told her at some point she may need a port to have access for IV infusions and blood trans fusions. We discussed some of the risks with this. For now, we will hold off, but if the IV sticks become more difficult, we may need to reconsider this.     6.  Intermittent yeast infection at the folds of her skin under her breasts bilaterally, intermittently. She thinks nystatin cream works better than nystatin powder. She uses Desitin as well.     7. Chronic kidney disease. Creatinine 0.8 and 1.2 on lab checks from 2013. Creatinine was noted to be 1.8 on 10/08/2015 and 11/03/2015. She is now seeing Dr. Isha Kraus of Nephrology for further management of this as well as her hyperkalemia and blood pressure issues.     *COPD and CHF.  Previously on 3 L O2 intermittently.  Since around March 2019, 4 L continuous O2.    *Leukocytosis.  Appears reactive.  No signs of an underlying hematologic disorder causing the leukocytosis.    PLAN:   · Monthly CBC.  Iron labs in 2 months.  · Procrit if Hb <10  · MD CBC Procrit 3 months  · Defer further BMP  assessments to her other providers.   · Last mammogram 10/17/18, BI-RADS 1.      Daughter assisted with history.  Cardiology records reviewed and summarized (they increase diuretics)

## 2019-06-24 NOTE — TELEPHONE ENCOUNTER
Pt called and states that she still have swelling LE but not as bad when she was here last time. Pt went back taking lasix 40 mg TID last Wednesday and swelling got better. Pt was on Bumex 1 mg TID. She want to know does she need to increase her lasix or go back to Bumex?.......Please advise    Pt can be reached at [phone]    Thanks  Charissa THOMPSON

## 2019-06-25 NOTE — TELEPHONE ENCOUNTER
Called and informed pt. Verbally understood..    md2u wants to know if its ok for pt to take prednisone 30 mg for 5 days?........Charissa EAGLE

## 2019-07-01 NOTE — TELEPHONE ENCOUNTER
Pt called and wants to know if you can order a wide wheelchair with song option in the back?......Please advise      Thanks  Charissa EAGLE

## 2019-07-10 NOTE — CONSULTS
Patient Name: Renuka Patel  :1940  79 y.o.    Date of Admission: 7/10/2019  Date of Consultation:  07/10/19  Encounter Provider: Rhiannon Rios MD  Place of Service: [facility] CARDIOLOGY  Referring Provider: Bhupendra De MD  Patient Care Team:  Pa Rodriguez MD as PCP - General (Internal Medicine)  Code, Manuel GOMEZ II, MD as Consulting Physician (Hematology and Oncology)      Chief complaint: shortness of breath    Reason for Consult: CHF    History of Present Illness:     This is an 79 year old with h/o CAD, h/o MI in  with LAD and diagonal stents placed, ischemic cardiomyopathy, hypertension, permanent atrial fibrillation, moderate MR, AAA, HAYDEN, COPD, DM, CKD, pacemaker.  She has a history of colon cancer and anemia, requiring epogen in the past.     She was seen 19 and had LE edema and fatigue.  She was on bumex 3x daily.  Despite this, her weight has increased as well as her edema.  She came in with increasing short windedness and leg heaviness, edema, decreased urine output    She has had no fevers, chills, dizziness or falls.  She had no syncope.  She has moved into assisted living but has not been having a high salt diet.        Previous Cardiac Testing:    [facility] 19    Echocardiogram 18  · Left ventricular systolic function is moderately decreased. Calculated EF = 30%. Estimated EF was in agreement with the calculated EF. Global left ventricular wall motion appears abnormal.  · The following segments are hypokinetic: mid anteroseptal, mid inferoseptal, apical septal, apical inferior and apex.  · The left ventricular cavity is mildly dilated.  · Left ventricular wall thickness is consistent with mild concentric hypertrophy.  · Left ventricular diastolic dysfunction is noted (grade III w/high LAP) consistent with fixed restricive pattern.  · Left atrial volume is moderately increased.  · There is mild calcification of the aortic  valve.  · The mitral valve is abnormal in structure. Mild MAC is present.  · Mild mitral valve regurgitation is present.  · Moderate tricuspid valve regurgitation is present. Estimated right ventricular systolic pressure from tricuspid regurgitation is mildly elevated (35-45 mmHg).  · There is a small (<1cm) pericardial effusion adjacent to the left ventricle. There is no evidence of cardiac tamponade.    Stress Test 9/8/16    · Left ventricular ejection fraction is severely reduced (Calculated EF = 28%).  · LVEF with stress is 37%. Findings consistent with nonischemic cardiomyopathy.  · Impressions are consistent with a low risk study.  · Myocardial perfusion imaging indicates a normal myocardial perfusion study with no evidence of ischemia.      Past Medical History:   Diagnosis Date   • Abdominal aortic aneurysm (CMS/HCC)     3.6 cm infrarenal January 2014   • Acute bronchitis    • Anemia     Secondary to stage 3 chronic kidney disease and hx of iron deficiency; followed by Dr. Manuel Hart   • Anxiety    • Atrial fibrillation (CMS/HCC) 12/2010    Previous cardioversion; status post AV node ablation by Dr. Bear Rodrigues 5/2012   • Back pain    • Breast cancer (CMS/HCC)     Stage I, status post lumpectomy; history of radiation   • CAD (coronary artery disease)    • Cardiomyopathy (CMS/HCC)    • CHF (congestive heart failure) (CMS/HCC)     Acute on chronic systolic and diastolic; followed by Dr. Rhiannon Rios   • Chronic kidney disease     Followed by Dr. Anna Gerardo    • Colon cancer (CMS/HCC)     Stage I, resected 01/27/2014   • COPD (chronic obstructive pulmonary disease) (CMS/HCC)     Oxygen dependent; followed by Dr. Reza   • Esophageal reflux    • Hernia of anterior abdominal wall 3/11/2016   • Hyperlipidemia    • Hypertension    • Joint pain     IN THE RIGHT KNEE   • Mass of adrenal gland (CMS/HCC)     lesion solitary, CT thereof   • Morbid obesity (CMS/HCC)    • Non-rheumatic mitral regurgitation     • NSTEMI (non-ST elevated myocardial infarction) (CMS/HCC) 11/2013   • Oral thrush    • HAYDEN on CPAP     Followed by Dr. Reza   • Osteoarthritis    • Pneumonia     onset date: 01 Mar 2011, Severe pneumonia w mechanical ventilation, treated at [facility]. Patricia Britton   • Shortness of breath    • Sick sinus syndrome (CMS/HCC)     Severe bradycardia; status post pacemaker implantation January 2011   • Strong family history of breast cancer    • Type 2 diabetes mellitus (CMS/HCC)    • Ventral hernia    • Vitamin D deficiency        Past Surgical History:   Procedure Laterality Date   • AV NODE ABLATION  05/2012    Dr. Bear Rodrigues   • BACK SURGERY     • BREAST BIOPSY     • BREAST LUMPECTOMY     • BREAST SURGERY     • CARDIAC PACEMAKER PLACEMENT     • COLON SURGERY  01/27/2014    resection for colon cancer   • COLONOSCOPY  07/10/2015    normal ileum, liopmatous ileocecal valve, diverticulosis throughout entire colon, colonic ulceration, IH, mixed TA/hyperplastic polyps   • CORONARY ANGIOPLASTY WITH STENT PLACEMENT  11/2013    2.25×15 mm drug-eluting stent to the second diagonal of the LAD, 2.75×18 mm Xience drug-eluting stent to the mid LAD, and 3.5×23 mm Xience drug-eluting stent to the proximal LAD   • ENDOSCOPY  07/10/2015    erythematous mucosa in stomach, no gross lesions in duodenum, proximal white plaques   • HAND SURGERY     • HYSTERECTOMY     • TIBIA FRACTURE SURGERY      left   • TOE SURGERY      bilat all toenails removed x3 r/t fungus         Prior to Admission medications    Medication Sig Start Date End Date Taking? Authorizing Provider   albuterol (PROVENTIL) (2.5 MG/3ML) 0.083% nebulizer solution INHALE THE CONTENTS OF ONE VIAL (3ML) EVERY 4 HOURS AS NEEDED 7/19/18  Yes Johanne Hamilton MD   allopurinol (ZYLOPRIM) 300 MG tablet Take 1 tablet by mouth Daily. 7/10/18  Yes Pattie Balbuena MD   atorvastatin (LIPITOR) 20 MG tablet Take 1 tablet by mouth Daily. 1/11/19  Yes Rhianonn Rios MD    carvedilol (COREG) 25 MG tablet TAKE ONE TABLET BY MOUTH IN THE MORNING, TAKE 1/2 TABLET AT NOON AND AT BEDTIME 6/11/19  Yes Rhiannon Rios MD   dabigatran etexilate (PRADAXA) 150 MG capsu Take 1 capsule by mouth Every 12 (Twelve) Hours. 2/25/19  Yes Rhiannon Rios MD   diphenhydrAMINE (BENADRYL) 25 MG tablet Take 25 mg by mouth Every 6 (Six) Hours As Needed for Itching.   Yes Johanne Hamilton MD   Fluticasone-Umeclidin-Vilant (TRELEGY ELLIPTA IN) Inhale Daily.   Yes Johanne Hamilton MD   furosemide (LASIX) 40 MG tablet Take 40 mg by mouth 3 (Three) Times a Day. One tab in the morning, second tab at 1100, third tab at 1500   Yes Johanne Hamilton MD   GLOBAL EASE INJECT PEN NEEDLES 32G X 4 MM misc USE AS DIRECTED 5/7/19  Yes Mary Ann Shell APRN   levocetirizine (XYZAL) 5 MG tablet Take 5 mg by mouth Every Evening.   Yes Johanne Hamilton MD   Melatonin 10 MG capsule Take 1 capsule by mouth At Night As Needed. 4/23/15  Yes Johanne Hamilton MD   omeprazole (priLOSEC) 40 MG capsule Take 40 mg by mouth Daily.   Yes Johanne Hamilton MD   potassium chloride (K-DUR,KLOR-CON) 20 MEQ CR tablet Take 1 tablet by mouth Daily. 10/22/18  Yes Rhiannon Rios MD   TRADJENTA 5 MG tablet tablet Take 1 tablet by mouth Daily. 5/2/19  Yes Pattie Balbuena MD   traZODone (DESYREL) 100 MG tablet Take 100 mg by mouth At Night As Needed for Sleep.   Yes Johanne Hamilton MD   fluticasone (FLONASE) 50 MCG/ACT nasal spray 2 sprays into each nostril Daily.    ProviderJohanne MD   Insulin Glargine 300 UNIT/ML solution pen-injector Inject 55 Units under the skin into the appropriate area as directed Daily.  Patient taking differently: Inject 45 Units under the skin into the appropriate area as directed Daily. 5/23/19   Mary Ann Shell APRN   ipratropium-albuterol (DUO-NEB) 0.5-2.5 mg/mL nebulizer INHALE THE contents OF ONE vial using nebulizer FOUR TIMES DAILY AS NEEDED 3/15/18    Provider, MD Johanne   nitroglycerin (NITROSTAT) 0.4 MG SL tablet PLACE ONE TABLET UNDER TONGUE FOR CHEST PAIN.  MAY REPEAT EVERY 5 MINUTES FOR 3  DOSES 2/11/19   Rhiannon Rios MD   bumetanide (BUMEX) 1 MG tablet TAKE ONE TABLET TWICE DAILY IF EDEMA WORSENS TAKE TWO TABLETS IN THE MORNING AND TAKE ONE TABLET IN THE EVENING 4/10/19 7/10/19  Provider, MD Johanne       Allergies   Allergen Reactions   • Codeine Itching     Edema   • Hydralazine Hcl      BP drops.   • Lisinopril Cough     Edema       Social History     Socioeconomic History   • Marital status:      Spouse name: Not on file   • Number of children: Not on file   • Years of education: Not on file   • Highest education level: Not on file   Occupational History   • Occupation: Retired   Tobacco Use   • Smoking status: Former Smoker     Packs/day: 1.00     Years: 25.00     Pack years: 25.00     Last attempt to quit: 2004     Years since quitting: 15.5   • Smokeless tobacco: Never Used   • Tobacco comment: caffeine use   Substance and Sexual Activity   • Alcohol use: Yes     Comment: Rare   • Drug use: No   • Sexual activity: Defer       Family History   Problem Relation Age of Onset   • Breast cancer Mother 60   • Breast cancer Sister 50   • Arrhythmia Sister    • Hypertension Sister    • Brain cancer Father 38   • Breast cancer Sister 40   • Heart attack Brother    • Hypertension Brother    • Diabetes Son    • Hypertension Son    • Heart disease Maternal Grandmother        REVIEW OF SYSTEMS:   All systems reviewed.  Pertinent positives identified in HPI.  All other systems are negative.      Objective:     Vitals:    07/10/19 1612 07/10/19 1704   BP: 120/55    BP Location: Left arm    Patient Position: Lying    Pulse: 70    Resp: 16    Temp: 98.6 °F (37 °C)    TempSrc: Oral    SpO2: 100%    Weight:  112 kg (246 lb 6.4 oz)     Body mass index is 42.29 kg/m².    General Appearance:    Alert, cooperative, in no acute distress   Head:     Normocephalic, without obvious abnormality, atraumatic   Eyes:            Lids and lashes normal, conjunctivae and sclerae normal, no   icterus, no pallor, corneas clear   Ears:    Ears appear intact with no abnormalities noted   Throat:   No oral lesions, no thrush, oral mucosa moist   Neck:   No adenopathy, supple, trachea midline, no thyromegaly, no   carotid bruit, no JVD   Back:     No kyphosis present, no scoliosis present, no skin lesions, erythema or scars, no tenderness, range of motion normal   Lungs:     Decreased in bases,respirations regular, even and unlabored    Heart:    Regular rhythm and normal rate, normal S1 and S2, no murmur, no gallop, no rub, no click   Chest Wall:    No abnormalities observed   Abdomen:     Normal bowel sounds, no masses, no organomegaly, soft        non-tender, distended, no guarding, no rebound  tenderness   Extremities:   Moves all extremities well, 4+  edema, no cyanosis, no redness   Pulses:   Pulses palpable and equal bilaterally. Normal radial, carotid, dorsalis pedis and posterior tibial pulses bilaterally.    Skin:  Psychiatric:   No bleeding, bruising or rash    Alert and oriented x 3, normal mood and affect   Lab Review:           Invalid input(s): LABALBU, PROT                              EKG 7/10/19-pending    Baseline EKG 8/24/16            I personally viewed and interpreted the patient's EKG/Telemetry data.        Assessment and Plan:       1. CHF with anasarca  2. MARCIN with CKD  3. Obesity.   4. Ischemic cardiomyopathy  5. CAD.   6. DM  7. Pacer  8. Atrial fibrillation on pradaxa, held currently due to concerns with renal function.   9. HAYDEN on CPAP.   10. Anemia, followed by Dr. Code.   11. H/o colon cancer  12. Wound right leg - consult wound care.     Concern for urinary retention, bladder scan.  Diurese. Check echo.     Rhiannon Rios MD  07/10/19  6:08 PM

## 2019-07-10 NOTE — H&P
History and physical    Primary care physician  Dr. Rodriguez    Chief complaint  Leg swelling  Generalized weakness  Nausea    History of present illness  79-year-old white female with very complex past medical history including COPD congestive heart failure coronary artery disease chronic atrial fibrillation diabetes mellitus hypertension obstructive sleep apnea breast cancer colon cancer iron deficiency anemia also have chronic kidney disease directly admitted to our service from assisted living with leg swelling generalized weakness and nausea.  Patient evaluated by primary care doctor and found to be in acute kidney injury on top of chronic kidney disease.  Patient also have elevated liver enzymes.  Patient denies any fever chills chest pain abdominal pain vomiting or diarrhea.  Patient complained of right leg redness and denies any trauma injury and fall but work-up revealed cellulitis.    Past Medical History:    Abdominal aortic aneurysm    • Acute combined systolic and diastolic congestive heart failure    • Anemia    • Anxiety    • Aortic aneurysm    • Atrial fibrillation    • Back pain    • Breast cancer      post lumpectomy   • CAD (coronary artery disease)    • Colon cancer    • Congestive heart failure    • COPD (chronic obstructive pulmonary disease)    • Coronary artery disease    • Diabetes mellitus      type 2   • Esophageal reflux    • Heart attack    • Hypertension    • Iron deficiency anemia secondary to inadequate dietary iron intake    • Joint pain      IN THE RIGHT KNEE   • Mass of adrenal gland      lesion solitary, CT thereof   • Morbid obesity    • Neoplasm of adrenal gland    • Obstructive sleep apnea    • HAYDEN on CPAP    • Osteoarthritis    • Osteoarthritis    • Pneumonia    • Pneumonia      onset date: 01 Mar 2011, Severe pneumonia w mechanical ventilation, treated at [facility]. Patricia Britton   • Radiation    • Sick sinus syndrome    • Sick sinus syndrome    • Type 2 diabetes mellitus    •  Vitamin D deficiency      Past Surgical History:   Surgical History          Past Surgical History   Procedure Laterality Date   • Hysterectomy     • Back surgery     • Colon surgery     • Hand surgery     • Cardiac pacemaker placement     • Breast surgery     • Breast lumpectomy     • Breast biopsy     • Tibia fracture surgery       left   • Toe surgery       bilat all toenails removed x3 r/t fungus     Allergies:         Allergies   Allergen Reactions   • Codeine Itching   • Hydralazine    • Hydralazine Hcl    • Lisinopril Cough   Home medications reviewed    Social History:         Social History   Substance Use Topics   • Smoking status: Former Smoker     Packs/day: 1.00     Years: 25.00     Quit date: 2004   • Smokeless tobacco: Never Used   • Alcohol use No     Family History:         Family History   Problem Relation Age of Onset   • Breast cancer Mother 60   • Breast cancer Sister 50   • Brain cancer Father 38   • Breast cancer Sister 40   • Heart attack Brother    • Diabetes Son    • Hypertension Son      Review of Systems   See history of present illness and past medical history.   Patient denies headache, dizziness, syncope, falls, trauma, change in vision, change in hearing, change in taste, changes in weight, changes in appetite, focal weakness, numbness, or paresthesia.   Patient has chest pain, palpitations, dyspnea, orthopnea, PND, cough, sinus pressure, rhinorrhea,   Denies epistaxis, hemoptysis, nausea, vomiting,hematemesis, diarrhea, constipation or hematchezia.   Denies cold or heat intolerance, polydipsia, polyuria, polyphagia. Denies hematuria, pyuria, dysuria, hesitancy, frequency or urgency.   Denies fever, chills, sweats, night sweats. Denies missing any routine medications. Remainder of ROS is negative.     Exam:   Blood pressure 120/55, pulse 70, temperature 98.6 °F (37 °C), temperature source Oral, resp. rate 16, SpO2 100 %.    General Appearance:  Alert, cooperative, no distress, appears  stated age   Head:  Normocephalic, without obvious abnormality, atraumatic   Eyes:  PERRL, conjunctiva/corneas clear, EOM's intact, both eyes   Ears:  Normal external ear canals, both ears   Nose: Nares normal, septum midline, mucosa normal, no drainage or sinus tenderness   Throat: Lips, tongue, gums normal; oral mucosa pink and moist   Neck: Supple, symmetrical, trachea midline, no adenopathy;   thyroid: no enlargement/tenderness/nodules; no carotid   bruit or JVD   Back:  Symmetric, no curvature, ROM normal, no CVA tenderness   Lungs:  decreased breath sounds at the bases    Chest Wall:  No tenderness or deformity   Heart:  Regular rate and rhythm, S1 and S2 normal, no murmur, rub or gallop   Abdomen:  Soft, non-tender, obese, incisional hernia in the upper abdomen, bowel sounds active all four quadrants,   no masses, no hepatomegaly, no splenomegaly, Velasco catheter in place    Extremities: Extremities normal, atraumatic, no cyanosis or edema   Pulses: Pulses palpable in all extremities; symmetric all extremities   Skin: Skin color normal, Skin is warm and dry, no rashes or palpable lesions, probably changes in the lower extremities noted    Neurologic: CNII-XII intact, motor strength grossly intact, sensation grossly intact to light touch, no focal deficits noted      Data Review:   Lab Results (last 24 hours)     ** No results found for the last 24 hours. **        Imaging Results (last 24 hours)     ** No results found for the last 24 hours. **          Current Facility-Administered Medications:   •  allopurinol (ZYLOPRIM) tablet 300 mg, 300 mg, Oral, Daily, Bhupendra De MD  •  atorvastatin (LIPITOR) tablet 20 mg, 20 mg, Oral, Daily, Bhupendra De MD  •  carvedilol (COREG) tablet 25 mg, 25 mg, Oral, BID With Meals, Bhupendra De MD  •  dabigatran etexilate (PRADAXA) capsule 150 mg, 150 mg, Oral, Q12H, Bhupendra De MD  •  Insulin Glargine solution pen-injector 55 Units, 55 Units, Subcutaneous, Daily, Kenisha  MD Ge  •  ipratropium-albuterol (DUO-NEB) nebulizer solution 1.5 mL, 1.5 mL, Nebulization, Q4H - RT, Ge De MD  •  linagliptin (TRADJENTA) tablet 5 mg, 5 mg, Oral, Daily, Ge De MD  •  [START ON 7/11/2019] pantoprazole (PROTONIX) EC tablet 40 mg, 40 mg, Oral, QAM, Ge De MD  •  potassium chloride (MICRO-K) CR capsule 20 mEq, 20 mEq, Oral, BID With Meals, Ge De MD  •  traZODone (DESYREL) tablet 100 mg, 100 mg, Oral, Nightly PRN, Ge De MD    ASSESSMENT  Acute kidney injury  Chronic kidney disease stage III  Acute on chronic systolic congestive heart failure  Right lower extremity cellulitis  Chronic atrial fibrillation on Pradaxa  Diabetes mellitus  Hypertension  Hyperlipidemia  COPD  Depression  Elevated liver enzymes probably secondary to congestion  Gastroesophageal reflux disease    PLAN  Admit  IV diuresis with strict I's and O's and daily weight  Nephrology consult  Continue home medications and adjust doses  Accu-Chek with sliding scale insulin  Repeat 2D echo  Blood cultures and start IV antibiotics  Stress ulcer DVT prophylaxis  Supportive care  Discussed with family  Patient is full code  Follow closely further recommendation according to hospital course    GE DE MD

## 2019-07-10 NOTE — PLAN OF CARE
Problem: Patient Care Overview  Goal: Plan of Care Review  Outcome: Ongoing (interventions implemented as appropriate)   07/10/19 7629   Coping/Psychosocial   Plan of Care Reviewed With patient   Plan of Care Review   Progress no change   OTHER   Outcome Summary Patient arrived this afternoon. Very edematous and swollen. Complaining of shortness of air- currently on 4L. Denies complaints of pain. Will continue to monitor.

## 2019-07-11 PROBLEM — R53.1 GENERALIZED WEAKNESS: Status: ACTIVE | Noted: 2019-01-01

## 2019-07-11 NOTE — PROGRESS NOTES
"Daily progress note    Chief complaint  Doing same  No new problems  Family at bedside    History of present illness  79-year-old white female with very complex past medical history including COPD congestive heart failure coronary artery disease chronic atrial fibrillation diabetes mellitus hypertension obstructive sleep apnea breast cancer colon cancer iron deficiency anemia also have chronic kidney disease directly admitted to our service from assisted living with leg swelling generalized weakness and nausea.  Patient evaluated by primary care doctor and found to be in acute kidney injury on top of chronic kidney disease.  Patient also have elevated liver enzymes.  Patient denies any fever chills chest pain abdominal pain vomiting or diarrhea.  Patient complained of right leg redness and denies any trauma injury and fall but work-up revealed cellulitis.    Review of Systems   See history of present illness and past medical history.   Patient denies headache, dizziness, syncope, falls, trauma, change in vision, change in hearing, change in taste, changes in weight, changes in appetite, focal weakness, numbness, or paresthesia.   Patient has chest pain, palpitations, dyspnea, orthopnea, PND, cough, sinus pressure, rhinorrhea,   Denies epistaxis, hemoptysis, nausea, vomiting,hematemesis, diarrhea, constipation or hematchezia.   Denies cold or heat intolerance, polydipsia, polyuria, polyphagia. Denies hematuria, pyuria, dysuria, hesitancy, frequency or urgency.   Denies fever, chills, sweats, night sweats. Denies missing any routine medications. Remainder of ROS is negative.     Exam:   Blood pressure 124/40, pulse 70, temperature 97.9 °F (36.6 °C), temperature source Oral, resp. rate 18, height 162.6 cm (64\"), weight 123 kg (271 lb 8 oz), SpO2 100 %.    General Appearance:  Alert, cooperative, no distress, appears stated age   Head:  Normocephalic, without obvious abnormality, atraumatic   Eyes:  PERRL, " conjunctiva/corneas clear, EOM's intact, both eyes   Ears:  Normal external ear canals, both ears   Nose: Nares normal, septum midline, mucosa normal, no drainage or sinus tenderness   Throat: Lips, tongue, gums normal; oral mucosa pink and moist   Neck: Supple, symmetrical, trachea midline, no adenopathy;   thyroid: no enlargement/tenderness/nodules; no carotid   bruit or JVD   Back:  Symmetric, no curvature, ROM normal, no CVA tenderness   Lungs:  decreased breath sounds at the bases    Chest Wall:  No tenderness or deformity   Heart:  Regular rate and rhythm, S1 and S2 normal, no murmur, rub or gallop   Abdomen:  Soft, non-tender, obese, incisional hernia in the upper abdomen, bowel sounds active all four quadrants,   no masses, no hepatomegaly, no splenomegaly, Velasco catheter in place    Extremities: Extremities normal, atraumatic, no cyanosis or edema   Pulses: Pulses palpable in all extremities; symmetric all extremities   Skin: Skin color normal, Skin is warm and dry, no rashes or palpable lesions, probably changes in the lower extremities noted    Neurologic: CNII-XII intact, motor strength grossly intact, sensation grossly intact to light touch, no focal deficits noted      Data Review:   Lab Results (last 24 hours)     Procedure Component Value Units Date/Time    POC Glucose Once [[id number]]  (Abnormal) Collected:  07/11/19 1106    Specimen:  Blood Updated:  07/11/19 1108     Glucose 167 mg/dL     Hemoglobin A1c [[id number]]  (Normal) Collected:  07/11/19 0526    Specimen:  Blood Updated:  07/11/19 0802     Hemoglobin A1C 5.40 %     Narrative:       Hemoglobin A1C Ranges:    Increased Risk for Diabetes  5.7% to 6.4%  Diabetes                     >= 6.5%  Diabetic Goal                < 7.0%    TSH [[id number]]  (Normal) Collected:  07/11/19 0526    Specimen:  Blood Updated:  07/11/19 0653     TSH 3.310 mIU/mL     BNP [[id number]]  (Abnormal) Collected:  07/11/19 0526    Specimen:  Blood Updated:  07/11/19 0653      proBNP 5,053.0 pg/mL     Narrative:       Among patients with dyspnea, NT-proBNP is highly sensitive for the detection of acute congestive heart failure. In addition NT-proBNP of <300 pg/ml effectively rules out acute congestive heart failure with 99% negative predictive value.    Troponin [[id number]]  (Normal) Collected:  07/11/19 0526    Specimen:  Blood Updated:  07/11/19 0653     Troponin T 0.015 ng/mL     Narrative:       Troponin T Reference Range:  <= 0.03 ng/mL-   Negative for AMI  >0.03 ng/mL-     Abnormal for myocardial necrosis.  Clinicians would have to utilize clinical acumen, EKG, Troponin and serial changes to determine if it is an Acute Myocardial Infarction or myocardial injury due to an underlying chronic condition.     Comprehensive Metabolic Panel [[id number]]  (Abnormal) Collected:  07/11/19 0526    Specimen:  Blood Updated:  07/11/19 0639     Glucose 71 mg/dL      BUN 71 mg/dL      Creatinine 1.95 mg/dL      Sodium 139 mmol/L      Potassium 4.8 mmol/L      Chloride 97 mmol/L      CO2 24.3 mmol/L      Calcium 8.3 mg/dL      Total Protein 5.0 g/dL      Albumin 2.20 g/dL      ALT (SGPT) 35 U/L      AST (SGOT) 119 U/L      Alkaline Phosphatase 1,040 U/L      Total Bilirubin 1.2 mg/dL      eGFR Non African Amer 25 mL/min/1.73      Globulin 2.8 gm/dL      A/G Ratio 0.8 g/dL      BUN/Creatinine Ratio 36.4     Anion Gap 17.7 mmol/L     Narrative:       GFR Normal >60  Chronic Kidney Disease <60  Kidney Failure <15    Lipid Panel [[id number]]  (Abnormal) Collected:  07/11/19 0526    Specimen:  Blood Updated:  07/11/19 0639     Total Cholesterol 72 mg/dL      Triglycerides 146 mg/dL      HDL Cholesterol 29 mg/dL      LDL Cholesterol  14 mg/dL      VLDL Cholesterol 29.2 mg/dL      LDL/HDL Ratio 0.48    Narrative:       Cholesterol Reference Ranges  (U.S. Department of Health and Human Services ATP III Classifications)    Desirable          <200 mg/dL  Borderline High    200-239 mg/dL  High Risk           >240 mg/dL      Triglyceride Reference Ranges  (U.S. Department of Health and Human Services ATP III Classifications)    Normal           <150 mg/dL  Borderline High  150-199 mg/dL  High             200-499 mg/dL  Very High        >500 mg/dL    HDL Reference Ranges  (U.S. Department of Health and Human Services ATP III Classifcations)    Low     <40 mg/dl (major risk factor for CHD)  High    >60 mg/dl ('negative' risk factor for CHD)        LDL Reference Ranges  (U.S. Department of Health and Human Services ATP III Classifcations)    Optimal          <100 mg/dL  Near Optimal     100-129 mg/dL  Borderline High  130-159 mg/dL  High             160-189 mg/dL  Very High        >189 mg/dL    POC Glucose Once [[id number]]  (Normal) Collected:  07/11/19 0618    Specimen:  Blood Updated:  07/11/19 0619     Glucose 79 mg/dL     CBC & Differential [[id number]] Collected:  07/11/19 0526    Specimen:  Blood Updated:  07/11/19 0619    Narrative:       The following orders were created for panel order CBC & Differential.  Procedure                               Abnormality         Status                     ---------                               -----------         ------                     CBC Auto Differential[[id number]]        Abnormal            Final result                 Please view results for these tests on the individual orders.    CBC Auto Differential [[id number]]  (Abnormal) Collected:  07/11/19 0526    Specimen:  Blood Updated:  07/11/19 0619     WBC 16.66 10*3/mm3      RBC 3.14 10*6/mm3      Hemoglobin 9.4 g/dL      Hematocrit 31.5 %      .3 fL      MCH 29.9 pg      MCHC 29.8 g/dL      RDW 22.0 %      RDW-SD 75.2 fl      MPV 13.8 fL      Platelets 134 10*3/mm3      Neutrophil % 81.3 %      Lymphocyte % 7.9 %      Monocyte % 7.6 %      Eosinophil % 2.0 %      Basophil % 0.2 %      Immature Grans % 1.0 %      Neutrophils, Absolute 13.54 10*3/mm3      Lymphocytes, Absolute 1.32 10*3/mm3      Monocytes, Absolute  1.26 10*3/mm3      Eosinophils, Absolute 0.33 10*3/mm3      Basophils, Absolute 0.04 10*3/mm3      Immature Grans, Absolute 0.17 10*3/mm3      nRBC 0.0 /100 WBC     Respiratory Panel, PCR - Swab, Nasopharynx [[id number]]  (Normal) Collected:  07/10/19 2128    Specimen:  Swab from Nasopharynx Updated:  07/10/19 2341     ADENOVIRUS, PCR Not Detected     Coronavirus 229E Not Detected     Coronavirus HKU1 Not Detected     Coronavirus NL63 Not Detected     Coronavirus OC43 Not Detected     Human Metapneumovirus Not Detected     Human Rhinovirus/Enterovirus Not Detected     Influenza B PCR Not Detected     Parainfluenza Virus 1 Not Detected     Parainfluenza Virus 2 Not Detected     Parainfluenza Virus 3 Not Detected     Parainfluenza Virus 4 Not Detected     Bordetella pertussis pcr Not Detected     Influenza A H1 2009 PCR Not Detected     Chlamydophila pneumoniae PCR Not Detected     Mycoplasma pneumo by PCR Not Detected     Influenza A PCR Not Detected     Influenza A H3 Not Detected     Influenza A H1 Not Detected     RSV, PCR Not Detected     Bordetella parapertussis PCR Not Detected    Urinalysis With Culture If Indicated - Urine, Clean Catch [[id number]]  (Abnormal) Collected:  07/10/19 2128    Specimen:  Urine, Clean Catch Updated:  07/10/19 2212     Color, UA Yellow     Appearance, UA Clear     pH, UA <=5.0     Specific Gravity, UA 1.016     Glucose, UA Negative     Ketones, UA Negative     Bilirubin, UA Negative     Blood, UA Negative     Protein, UA Negative     Leuk Esterase, UA Small (1+)     Nitrite, UA Negative     Urobilinogen, UA 0.2 E.U./dL    Urinalysis, Microscopic Only - Urine, Clean Catch [[id number]]  (Abnormal) Collected:  07/10/19 2128    Specimen:  Urine, Clean Catch Updated:  07/10/19 2212     RBC, UA 0-2 /HPF      WBC, UA 3-5 /HPF      Bacteria, UA None Seen /HPF      Squamous Epithelial Cells, UA 0-2 /HPF      Hyaline Casts, UA 0-2 /LPF      Methodology Automated Microscopy    POC Glucose Once  [[id number]]  (Normal) Collected:  07/10/19 2025    Specimen:  Blood Updated:  07/10/19 2028     Glucose 112 mg/dL     TSH [[id number]]  (Normal) Collected:  07/10/19 1755    Specimen:  Blood from Hand, Left Updated:  07/10/19 1847     TSH 2.890 mIU/mL     Troponin [[id number]]  (Normal) Collected:  07/10/19 1755    Specimen:  Blood from Hand, Left Updated:  07/10/19 1847     Troponin T 0.015 ng/mL     Narrative:       Troponin T Reference Range:  <= 0.03 ng/mL-   Negative for AMI  >0.03 ng/mL-     Abnormal for myocardial necrosis.  Clinicians would have to utilize clinical acumen, EKG, Troponin and serial changes to determine if it is an Acute Myocardial Infarction or myocardial injury due to an underlying chronic condition.     Comprehensive Metabolic Panel [[id number]]  (Abnormal) Collected:  07/10/19 1755    Specimen:  Blood from Hand, Left Updated:  07/10/19 1834     Glucose 118 mg/dL      BUN 69 mg/dL      Creatinine 1.97 mg/dL      Sodium 142 mmol/L      Potassium 4.9 mmol/L      Chloride 99 mmol/L      CO2 22.8 mmol/L      Calcium 8.1 mg/dL      Total Protein 5.1 g/dL      Albumin 2.40 g/dL      ALT (SGPT) 37 U/L      AST (SGOT) 121 U/L      Alkaline Phosphatase 1,042 U/L      Total Bilirubin 1.3 mg/dL      eGFR Non African Amer 24 mL/min/1.73      Globulin 2.7 gm/dL      A/G Ratio 0.9 g/dL      BUN/Creatinine Ratio 35.0     Anion Gap 20.2 mmol/L     Narrative:       GFR Normal >60  Chronic Kidney Disease <60  Kidney Failure <15    Uric Acid [[id number]]  (Abnormal) Collected:  07/10/19 1755    Specimen:  Blood from Hand, Left Updated:  07/10/19 1834     Uric Acid 7.5 mg/dL     Magnesium [[id number]]  (Normal) Collected:  07/10/19 1755    Specimen:  Blood from Hand, Left Updated:  07/10/19 1834     Magnesium 2.4 mg/dL     CBC & Differential [[id number]] Collected:  07/10/19 1755    Specimen:  Blood Updated:  07/10/19 1811    Narrative:       The following orders were created for panel order CBC &  Differential.  Procedure                               Abnormality         Status                     ---------                               -----------         ------                     CBC Auto Differential[[id number]]        Abnormal            Final result                 Please view results for these tests on the individual orders.    CBC Auto Differential [[id number]]  (Abnormal) Collected:  07/10/19 1755    Specimen:  Blood from Hand, Left Updated:  07/10/19 1811     WBC 17.60 10*3/mm3      RBC 3.31 10*6/mm3      Hemoglobin 10.0 g/dL      Hematocrit 33.3 %      .6 fL      MCH 30.2 pg      MCHC 30.0 g/dL      RDW 21.7 %      RDW-SD 75.7 fl      MPV 12.7 fL      Platelets 128 10*3/mm3      Neutrophil % 82.2 %      Lymphocyte % 7.9 %      Monocyte % 7.5 %      Eosinophil % 1.1 %      Basophil % 0.2 %      Immature Grans % 1.1 %      Neutrophils, Absolute 14.45 10*3/mm3      Lymphocytes, Absolute 1.39 10*3/mm3      Monocytes, Absolute 1.32 10*3/mm3      Eosinophils, Absolute 0.20 10*3/mm3      Basophils, Absolute 0.04 10*3/mm3      Immature Grans, Absolute 0.20 10*3/mm3      nRBC 0.1 /100 WBC     Blood Culture - Blood, Arm, Left [[id number]] Collected:  07/10/19 1755    Specimen:  Blood from Arm, Left Updated:  07/10/19 1802    Blood Culture - Blood, Hand, Left [[id number]] Collected:  07/10/19 1755    Specimen:  Blood from Hand, Left Updated:  07/10/19 1802    POC Glucose Once [[id number]]  (Normal) Collected:  07/10/19 1658    Specimen:  Blood Updated:  07/10/19 1702     Glucose 126 mg/dL         Imaging Results (last 24 hours)     Procedure Component Value Units Date/Time    XR Chest 1 View [[id number]] Collected:  07/10/19 1832     Updated:  07/10/19 1836    Narrative:       PORTABLE CHEST 07/10/2019 5:30 PM     CLINICAL HISTORY: Dyspnea     Compared to the previous chest x-ray dated 06/07/2019.     The lungs are somewhat poorly inflated. There is abnormal increased  density at the left lung base producing  obscuration of the left  hemidiaphragm that is more prominent than on the previous chest x-ray.  Left lower lobe pneumonia and/or atelectasis is suspected. A small left  pleural effusion is also likely present. The right lung is better  expanded and clear. The heart is markedly enlarged and unchanged. A  dual-chamber pacemaker is in place in satisfactory position in the left  subclavian vein without change.     This report was finalized on 7/10/2019 6:33 PM by Dr. Abhishek Abraham M.D.             Current Facility-Administered Medications:   •  albuterol (PROVENTIL) nebulizer solution 0.5% 2.5 mg/0.5mL, 2.5 mg, Nebulization, Q4H - RT, Bhupendra De MD, 2.5 mg at 07/11/19 1444  •  allopurinol (ZYLOPRIM) tablet 100 mg, 100 mg, Oral, Daily, Bhupendra De MD, 100 mg at 07/11/19 0814  •  bumetanide (BUMEX) 10 mg in sodium chloride 0.9 % 100 mL (0.1 mg/mL) infusion, 1 mg/hr, Intravenous, Continuous, Rommel Gerardo MD  •  bumetanide (BUMEX) injection 1 mg, 1 mg, Intravenous, Once, Rommel Gerardo MD  •  carvedilol (COREG) tablet 12.5 mg, 12.5 mg, Oral, Daily, Bhupendra De MD, 12.5 mg at 07/10/19 2133  •  carvedilol (COREG) tablet 12.5 mg, 12.5 mg, Oral, Daily, Bhupendra De MD, 12.5 mg at 07/11/19 1305  •  carvedilol (COREG) tablet 25 mg, 25 mg, Oral, Daily, Bhupendra De MD, 25 mg at 07/11/19 0814  •  ceFAZolin (ANCEF) IVPB 1 g, 1 g, Intravenous, Q8H, Bhupendra De MD, 1 g at 07/11/19 1304  •  guaiFENesin (MUCINEX) 12 hr tablet 600 mg, 600 mg, Oral, Q12H, Bhupendra De MD, 600 mg at 07/11/19 0814  •  insulin glargine (LANTUS) injection 55 Units, 55 Units, Subcutaneous, Daily, Bhupendra De MD, 45 Units at 07/11/19 1259  •  insulin lispro (humaLOG) injection 0-7 Units, 0-7 Units, Subcutaneous, 4x Daily With Meals & Nightly, Bhupendra De MD, 2 Units at 07/11/19 1259  •  linagliptin (TRADJENTA) tablet 5 mg, 5 mg, Oral, Daily, Bhupendra De MD, 5 mg at 07/11/19 0814  •  nitroglycerin (NITROSTAT) SL tablet 0.4  mg, 0.4 mg, Sublingual, Q5 Min PRN, Ge De MD  •  pantoprazole (PROTONIX) EC tablet 40 mg, 40 mg, Oral, QAM, Ge De MD, 40 mg at 07/11/19 0635  •  potassium chloride (MICRO-K) CR capsule 20 mEq, 20 mEq, Oral, BID With Meals, Ge De MD, 20 mEq at 07/11/19 0814  •  traZODone (DESYREL) tablet 100 mg, 100 mg, Oral, Nightly PRN, Ge De MD  •  TRELEGY (Fluticasone-Umeclidin-Vilant)100-62.5-25 MCG/INH IN, 1 each, Inhalation, Daily - RT, Ge De MD    ASSESSMENT  Acute kidney injury  Chronic kidney disease stage III  Acute on chronic systolic congestive heart failure  Right lower extremity cellulitis  Chronic atrial fibrillation on Pradaxa  Diabetes mellitus  Hypertension  Hyperlipidemia  COPD  Depression  Elevated liver enzymes probably secondary to congestion  Gastroesophageal reflux disease    PLAN  CPM  Bumex drip  Nephrology consult appreciated  Continue home medications and adjust doses  Accu-Chek with sliding scale insulin  2D echo pending  IV antibiotics  Stress ulcer DVT prophylaxis  Supportive care  Discussed with family  Follow closely further recommendation according to hospital course    GE DE MD

## 2019-07-11 NOTE — DISCHARGE PLACEMENT REQUEST
"Marshall Patel (79 y.o. Female)     Date of Birth Social Security Number Address Home Phone MRN    1940  [place]  [id number]  [facility] [address] [phone] [id number]    Shinto Marital Status          None        Admission Date Admission Type Admitting Provider Attending Provider Department, Room/Bed    7/10/19 Urgent Bhupendra De MD Ahmed, Aftab, MD [address], [address]    Discharge Date Discharge Disposition Discharge Destination                       Attending Provider:  Bhupendra De MD    Allergies:  Codeine, Hydralazine Hcl, Lisinopril    Isolation:  None   Infection:  None   Code Status:  CPR    Ht:  162.6 cm (64\")   Wt:  123 kg (271 lb 8 oz)    Admission Cmt:  None   Principal Problem:  None                Active Insurance as of 7/10/2019     Primary Coverage     Payor Plan Insurance Group Employer/Plan Group    MEDICARE MEDICARE A & B      Payor Plan Address Payor Plan Phone Number Payor Plan Fax Number Effective Dates    [address] [phone]  5/1/2004 - None Entered    [facility] [id number]       Subscriber Name Subscriber Birth Date Member ID       MARSHALL PATEL 1940 [id number]           Secondary Coverage     Payor Plan Insurance Group Employer/Plan Group    [facility] SUPP [id number]     Payor Plan Address Payor Plan Phone Number Payor Plan Fax Number Effective Dates    [address]   12/1/2016 - None Entered    [facility] [id number]       Subscriber Name Subscriber Birth Date Member ID       MARSHALL PATEL 1940 [id number]                 Emergency Contacts      (Rel.) Home Phone Work Phone Mobile Phone    Charissa Milian (Daughter) [phone] -- [phone]    JorgeRemi (Son) [phone] -- --              "

## 2019-07-11 NOTE — PLAN OF CARE
Problem: Patient Care Overview  Goal: Plan of Care Review  Outcome: Ongoing (interventions implemented as appropriate)   07/11/19 8420   Coping/Psychosocial   Plan of Care Reviewed With patient   Plan of Care Review   Progress no change   OTHER   Outcome Summary Patient on Bumex drip. Purewick in place. Scheduled to get an ultrasound of gallbladder tonight. ECHO scheduled for tomorrow. Strict I&O. Complains of pain with cough. VSS on 4L. Will continue to monitor.

## 2019-07-11 NOTE — PLAN OF CARE
Problem: Patient Care Overview  Goal: Plan of Care Review   07/11/19 1257   Coping/Psychosocial   Plan of Care Reviewed With patient;family   OTHER   Outcome Summary Pt 78 yo female admitted to [facility] with generalized weakness, BLE swelling who presents to OT with decreased functional mobility and independence with ADLs. Pt just moved into an Assisted living last week. Pt Currently Mod-Max A Supine to sit, requiring assist to manage LEs, Total A for LBD, grooming with set-up. Pt unable to sit up long secondary to increased abdominal pain and coughing attack so requested to lie back down. Pt will benefit from skilled OT to address deficits and increase Ind with ADLs and, depending on improvements, SNF vs AL upon d/c.

## 2019-07-11 NOTE — PROGRESS NOTES
"      HOSPITAL PROGRESS NOTE    Date of Service:   LOS:  LOS: 1 day   Patient Name: Renuka Patel  Age/Sex: 79 y.o. female  : 1940  MRN: [id number]    Subjective:     Chief Complaint/Follow up:   Shortness of Breath, Lower Extremity Edema, Heart Failure, PAF, MARCIN on CKD    Interval History:  Lying in bed today with family accompanying her.  Her shortness of breath has improved \"a little\".  Lower extremity edema has remained the same.  Her weight today was 271 pounds her standing scale and she states that she has never weighed that much.  Her weight in the office in  was 258 pounds.  Creatinine has slightly improved since yesterday.  Family requesting a wound consultation.    Objective:     Objective:  Temp:  [97.9 °F (36.6 °C)-98.6 °F (37 °C)] 97.9 °F (36.6 °C)  Heart Rate:  [69-70] 69  Resp:  [16-18] 18  BP: (104-124)/(40-60) 124/40  Body mass index is 46.6 kg/m².    Intake/Output Summary (Last 24 hours) at 2019 09  Last data filed at 2019 0835  Gross per 24 hour   Intake 240 ml   Output 800 ml   Net -560 ml         07/10/19  1704 19  0907   Weight: 112 kg (246 lb 6.4 oz) 123 kg (271 lb 8 oz)     Physical Exam:   General Appearance: Alert, cooperative, in no acute distress. AAOx4. Obese.   HEENT: Normocephalic.  Neck: Supple. No JVD. No Carotid bruit. No thyromegaly  Lungs: Clear. Normal respiratory effort and rate.  Heart:: Regular rate and rhythm, normal S1 and S2, no murmurs, gallops or rubs.  Abdomen: Soft, nontender, non-distended. positive bowel sounds  Extremities: Warm, no cyanosis, or clubbing. +2 lower extremity edema.  Wound noted on right.    Lab Review:   Results from last 7 days   Lab Units 19  0526 07/10/19  1755   SODIUM mmol/L 139 142   POTASSIUM mmol/L 4.8 4.9   CHLORIDE mmol/L 97* 99   CO2 mmol/L 24.3 22.8   BUN mg/dL 71* 69*   CREATININE mg/dL 1.95* 1.97*   GLUCOSE mg/dL 71 118*   CALCIUM mg/dL 8.3* 8.1*   AST (SGOT) U/L 119* 121*   ALT (SGPT) U/L 35* 37* "     Results from last 7 days   Lab Units 07/11/19  0526 07/10/19  1755   TROPONIN T ng/mL 0.015 0.015     Results from last 7 days   Lab Units 07/11/19  0526 07/10/19  1755   WBC 10*3/mm3 16.66* 17.60*   HEMOGLOBIN g/dL 9.4* 10.0*   HEMATOCRIT % 31.5* 33.3*   PLATELETS 10*3/mm3 134* 128*         Results from last 7 days   Lab Units 07/10/19  1755   MAGNESIUM mg/dL 2.4     Results from last 7 days   Lab Units 07/11/19  0526   CHOLESTEROL mg/dL 72   TRIGLYCERIDES mg/dL 146   HDL CHOL mg/dL 29*     Results from last 7 days   Lab Units 07/11/19  0526   PROBNP pg/mL 5,053.0*     Results from last 7 days   Lab Units 07/11/19  0526 07/10/19  1755   TSH mIU/mL 3.310 2.890       Results for orders placed during the hospital encounter of 07/23/18   Adult Transthoracic Echo Complete W/ Cont if Necessary Per Protocol    Narrative · Left ventricular systolic function is moderately decreased. Calculated   EF = 30%. Estimated EF was in agreement with the calculated EF. Global   left ventricular wall motion appears abnormal.  · The following segments are hypokinetic: mid anteroseptal, mid   inferoseptal, apical septal, apical inferior and apex.  · The left ventricular cavity is mildly dilated.  · Left ventricular wall thickness is consistent with mild concentric   hypertrophy.  · Left ventricular diastolic dysfunction is noted (grade III w/high LAP)   consistent with fixed restricive pattern.  · Left atrial volume is moderately increased.  · There is mild calcification of the aortic valve.  · The mitral valve is abnormal in structure. Mild MAC is present.  · Mild mitral valve regurgitation is present.  · Moderate tricuspid valve regurgitation is present. Estimated right   ventricular systolic pressure from tricuspid regurgitation is mildly   elevated (35-45 mmHg).  · There is a small (<1cm) pericardial effusion adjacent to the left   ventricle. There is no evidence of cardiac tamponade.        I reviewed the patient's new clinical  results.  I personally viewed and interpreted the patient's EKG/Telemetry data/Labs/Test Results.     Current Medications:   Scheduled Meds:  allopurinol 100 mg Oral Daily   bumetanide 2 mg Intravenous Q8H   carvedilol 12.5 mg Oral Daily   carvedilol 12.5 mg Oral Daily   carvedilol 25 mg Oral Daily   ceFAZolin 1 g Intravenous Q8H   guaiFENesin 600 mg Oral Q12H   insulin glargine 55 Units Subcutaneous Daily   insulin lispro 0-7 Units Subcutaneous 4x Daily With Meals & Nightly   ipratropium-albuterol 1.5 mL Nebulization Q4H - RT   linagliptin 5 mg Oral Daily   pantoprazole 40 mg Oral QAM   potassium chloride 20 mEq Oral BID With Meals   :     Allergies:  Allergies   Allergen Reactions   • Codeine Itching     Edema   • Hydralazine Hcl      BP drops.   • Lisinopril Cough     Edema       Assessment:     Heart Failure  MARCIN with CKD  Abnormal Chest x-ray  Atrial Fibrillation  Anemia  Obstructive Sleep Apnea  CAD  Right Leg Wound    Plan:   Assessment/Plan       1.  Heart Failure: Admitted with heart failure with anasarca.  Dyspnea has improved slightly and lower extremity edema has remained the same.  Continue IV Bumex.  Echocardiogram to be completed today.  I recommend the patient only have weights on standing scale so we can track her weight loss.    2.  MARCIN with CKD: Creatinine has slightly improved from yesterday, but still significantly elevated.  Gentle diuresis.    3.  Abnormal Chest x-ray: Possible pneumonia?  And elevated white blood cell count.  Defer to medicine.  Patient also thinks that she may have a sinus infection and has a cough.    4.  Atrial Fibrillation: Previously on dabigatran, but currently held with concerns of her renal function.    5.  Anemia-followed by Dr. Hart.    6.  Obstructive Sleep Apnea: Daughter brought in her CPAP machine today.    7.  CAD: Denies angina.    8.  Wound Right Leg: Wound care consultation placed.      REGAN Welch  [facility]   07/11/19  9:19 AM

## 2019-07-11 NOTE — THERAPY EVALUATION
Acute Care - Occupational Therapy Initial Evaluation  [facility]     Patient Name: Renuka Patel  : 1940  MRN: [id number]  Today's Date: 2019  Onset of Illness/Injury or Date of Surgery: 07/10/19  Date of Referral to OT: 07/10/19  Referring Physician: Gabriel     Admit Date: 7/10/2019       ICD-10-CM ICD-9-CM   1. Generalized weakness R53.1 780.79     Patient Active Problem List   Diagnosis   • Abdominal aortic aneurysm (CMS/HCC)   • Chronic coronary artery disease   • Atrial fibrillation (CMS/HCC)   • Cardiomyopathy (CMS/HCC)   • Cancer of transverse colon (CMS/HCC)   • Acute on chronic combined systolic and diastolic CHF (congestive heart failure) (CMS/HCC)   • Type 2 diabetes with stage 3 chronic kidney disease GFR 30-59 (CMS/HCC)   • Shortness of breath   • Hyperlipidemia   • Hypertension   • Hernia of anterior abdominal wall   • Vitamin D deficiency   • Iron deficiency anemia refractory to iron therapy   • Anemia due to stage 3 chronic kidney disease treated with erythropoietin (CMS/HCC)   • HAYDEN (obstructive sleep apnea)   • Morbid obesity with BMI of 40.0-44.9, adult (CMS/HCC)   • COPD (chronic obstructive pulmonary disease) (CMS/HCC)   • Mitral regurgitation, moderate   • Uncontrolled type 2 diabetes mellitus with complication, with long-term current use of insulin (CMS/HCC)   • H/O cardiac pacemaker   • Stage 3 chronic kidney disease (CMS/HCC)   • Malabsorption of iron   • Generalized weakness     Past Medical History:   Diagnosis Date   • Abdominal aortic aneurysm (CMS/HCC)     3.6 cm infrarenal 2014   • Acute bronchitis    • Anemia     Secondary to stage 3 chronic kidney disease and hx of iron deficiency; followed by Dr. Manuel Hart   • Anxiety    • Atrial fibrillation (CMS/HCC) 2010    Previous cardioversion; status post AV node ablation by Dr. Bear Rodrigues 2012   • Back pain    • Breast cancer (CMS/HCC)     Stage I, status post lumpectomy; history of radiation   • CAD  (coronary artery disease)    • Cardiomyopathy (CMS/HCC)    • CHF (congestive heart failure) (CMS/HCC)     Acute on chronic systolic and diastolic; followed by Dr. Rhiannon Rios   • Chronic kidney disease     Followed by Dr. Anna Gerardo    • Colon cancer (CMS/HCC)     Stage I, resected 01/27/2014   • COPD (chronic obstructive pulmonary disease) (CMS/HCC)     Oxygen dependent; followed by Dr. Reza   • Esophageal reflux    • Hernia of anterior abdominal wall 3/11/2016   • Hyperlipidemia    • Hypertension    • Joint pain     IN THE RIGHT KNEE   • Mass of adrenal gland (CMS/HCC)     lesion solitary, CT thereof   • Morbid obesity (CMS/HCC)    • Non-rheumatic mitral regurgitation    • NSTEMI (non-ST elevated myocardial infarction) (CMS/HCC) 11/2013   • Oral thrush    • HAYDEN on CPAP     Followed by Dr. Reza   • Osteoarthritis    • Pneumonia     onset date: 01 Mar 2011, Severe pneumonia w mechanical ventilation, [facility] at [facility]   • Shortness of breath    • Sick sinus syndrome (CMS/HCC)     Severe bradycardia; status post pacemaker implantation January 2011   • Strong family history of breast cancer    • Type 2 diabetes mellitus (CMS/HCC)    • Ventral hernia    • Vitamin D deficiency      Past Surgical History:   Procedure Laterality Date   • AV NODE ABLATION  05/2012    Dr. Bear Rodrigues   • BACK SURGERY     • BREAST BIOPSY     • BREAST LUMPECTOMY     • BREAST SURGERY     • CARDIAC PACEMAKER PLACEMENT     • COLON SURGERY  01/27/2014    resection for colon cancer   • COLONOSCOPY  07/10/2015    normal ileum, liopmatous ileocecal valve, diverticulosis throughout entire colon, colonic ulceration, IH, mixed TA/hyperplastic polyps   • CORONARY ANGIOPLASTY WITH STENT PLACEMENT  11/2013    2.25×15 mm drug-eluting stent to the second diagonal of the LAD, 2.75×18 mm Xience drug-eluting stent to the mid LAD, and 3.5×23 mm Xience drug-eluting stent to the proximal LAD   • ENDOSCOPY  07/10/2015    erythematous  mucosa in stomach, no gross lesions in duodenum, proximal white plaques   • HAND SURGERY     • HYSTERECTOMY     • TIBIA FRACTURE SURGERY      left   • TOE SURGERY      bilat all toenails removed x3 r/t fungus          OT ASSESSMENT FLOWSHEET (last 12 hours)      Occupational Therapy Evaluation     Row Name 07/11/19 1036                   OT Evaluation Time/Intention    Subjective Information  complains of;fatigue;pain  -SK        Document Type  evaluation  -SK        Mode of Treatment  individual therapy;occupational therapy  -SK        Patient Effort  adequate  -SK        Symptoms Noted During/After Treatment  increased pain  -SK        Comment  pt son and daughter present, stated pt needs to be fairly Independent upon D/C because at AL and needs to be able to go to bathroom by herself   -SK           General Information    Patient Profile Reviewed?  yes  -SK        Onset of Illness/Injury or Date of Surgery  07/10/19  -SK        Referring Physician  Gabriel   -SK        Patient Observations  alert;cooperative;agree to therapy  -SK        Patient/Family Observations  family present, states pt has equipment and will get anything else she might need to help her be more independent   -SK        General Observations of Patient  pt supine in bed, no signs of distress noted   -SK        Prior Level of Function  independent:;min assist:;ADL's  -SK        Equipment Currently Used at Home  cane, straight;walker, rolling;oxygen;grab bar  -SK        Pertinent History of Current Functional Problem  SOA, CHF  -SK        Existing Precautions/Restrictions  fall;oxygen therapy device and L/min  -SK           Cognitive Assessment/Intervention- PT/OT    Orientation Status (Cognition)  oriented x 3  -SK        Follows Commands (Cognition)  WFL  -SK           Safety Issues, Functional Mobility    Impairments Affecting Function (Mobility)  endurance/activity tolerance;strength  -SK        Comment, Safety Issues/Impairments (Mobility)   pt require encouragement to particpate in therapy   -SK           Bed Mobility Assessment/Treatment    Bed Mobility Assessment/Treatment  supine-sit;sit-supine  -SK        Supine-Sit [place] (Bed Mobility)  moderate assist (50% patient effort);maximum assist (25% patient effort)  -SK        Sit-Supine [place] (Bed Mobility)  maximum assist (25% patient effort)  -SK        Bed Mobility, Safety Issues  decreased use of legs for bridging/pushing;decreased use of arms for pushing/pulling  -SK        Assistive Device (Bed Mobility)  bed rails;draw sheet;head of bed elevated  -SK           Transfer Assessment/Treatment    Transfer Assessment/Treatment  sit-stand transfer;stand-sit transfer;toilet transfer  -SK           ADL Assessment/Intervention    BADL Assessment/Intervention  bathing;upper body dressing;lower body dressing;grooming;toileting  -SK           Lower Body Dressing Assessment/Training    Lower Body Dressing [place] Level  doff;don;shoes/slippers;dependent (less than 25% patient effort)  -SK        Lower Body Dressing Position  edge of bed sitting  -SK        Comment (Lower Body Dressing)  dep at this time seconary to unable to bend to reach feet due to increaed pain   -SK           Grooming Assessment/Training    [place] Level (Grooming)  grooming skills;set up;supervision  -SK        Grooming Position  supported sitting  -SK           General ROM    GENERAL ROM COMMENTS  BUE WFL   -SK           MMT (Manual Muscle Testing)    General MMT Comments  grossly 3+/5  -SK           Motor Assessment/Interventions    Additional Documentation  Balance (Group);Balance Interventions (Group);Therapeutic Exercise (Group);Therapeutic Exercise Interventions (Group)  -SK           Sensory Assessment/Intervention    Sensory General Assessment  no sensation deficits identified  -SK           Positioning and Restraints    Pre-Treatment Position  in bed  -SK        Post Treatment Position  bed  -SK         In Bed  supine;call light within reach;encouraged to call for assist;exit alarm on;with family/caregiver  -SK           Pain Scale: Numbers Pre/Post-Treatment    Pain Location - Orientation  generalized  -SK        Pain Location  abdomen  -SK           Pain Scale: FACES Pre/Post-Treatment    Pain: FACES Scale, Pretreatment  2-->hurts little bit  -SK        Pain: FACES Scale, Post-Treatment  6-->hurts even more  -SK           Plan of Care Review    Plan of Care Reviewed With  patient;daughter;son  -SK           Clinical Impression (OT)    Date of Referral to OT  07/10/19  -SK        OT Diagnosis  Pt 80 yo female admitted to [facility] with generalized weakness, BLE swelling who presents to OT with decreased functional mobility and independence with ADLs.  Pt just moved into an Assisted living last week.    -SK        Functional Level at Time of Evaluation (OT Eval)  Pt Currently Mod-Max  A Supine to sit, requiring assist to manage LEs, Total A for LBD, grooming with set-up.  Pt unable to sit up long secondary to increased abdominal pain and coughing attack so requested to lie back down.  Pt will benefit from skilled OT to address deficits and increase Ind with ADLs and, depending on improvements, SNF vs AL upon d/c.   -SK        Patient/Family Goals Statement (OT Eval)  pt family stated that pt needs to be Ind with going to bathroom and toileting to go back to [place]   -SK        Criteria for Skilled Therapeutic Interventions Met (OT Eval)  yes  -SK        Rehab Potential (OT Eval)  good, to achieve stated therapy goals  -SK        Therapy Frequency (OT Eval)  5 times/wk  -SK        Care Plan Review (OT)  evaluation/treatment results reviewed;care plan/treatment goals reviewed;patient/other agree to care plan  -SK        Care Plan Review, Other Participant (OT Eval)  daughter;son  -SK        Anticipated Discharge Disposition (OT)  skilled nursing facility;assisted living facility (nursing home) depending on progress   -SK         Patient/Family Concerns, Anticipated Discharge Disposition (OT)  family states concerns with pt not being motivated to get up and partipate in therapy  -SK           OT Goals    Transfer Goal Selection (OT)  transfer, OT goal 1  -SK        Toileting Goal Selection (OT)  toileting, OT goal 1  -SK        Strength Goal Selection (OT)  strength, OT goal 1  -SK        Activity Tolerance Goal Selection (OT)  activity tolerance, OT goal 1  -SK        Additional Documentation  Strength Goal Selection (OT) (Row);Activity Tolerance Goal Selection (OT) (Row)  -SK           Transfer Goal 1 (OT)    Activity/Assistive Device (Transfer Goal 1, OT)  sit-to-stand/stand-to-sit;toilet  -SK        [place] Level/Cues Needed (Transfer Goal 1, OT)  minimum assist (75% or more patient effort)  -SK        Time Frame (Transfer Goal 1, OT)  1 week  -SK        Progress/Outcome (Transfer Goal 1, OT)  goal ongoing  -SK           Toileting Goal 1 (OT)    Activity/Device (Toileting Goal 1, OT)  toileting skills, all;adjust/manage clothing;perform perineal hygiene;raised toilet seat;grab bar/safety frame  -SK        [place] Level/Cues Needed (Toileting Goal 1, OT)  minimum assist (75% or more patient effort)  -SK        Time Frame (Toileting Goal 1, OT)  1 week  -SK        Progress/Outcome (Toileting Goal 1, OT)  goal ongoing  -SK           Strength Goal 1 (OT)    Strength Goal 1 (OT)  pt perform BUE AROM ex to increase strength to increase ind with ADLs   -SK        Time Frame (Strength Goal 1, OT)  1 week  -SK        Progress/Outcome (Strength Goal 1, OT)  goal ongoing  -SK            Activity Tolerance Goal 1 (OT)    Activity Tolerance Goal 1 (OT)  pt engage in ADL activity in upright position without rest break   -SK        Activity Level (Endurance Goal 1, OT)  5 min activity  -SK        Time Frame (Activity Tolerance Goal 1, OT)  1 week  -SK        Progress/Outcome (Activity Tolerance Goal 1, OT)  goal ongoing  -SK          User Key   (r) = Recorded By, (t) = Taken By, (c) = Cosigned By    Initials Name Effective Dates    SK Ruth Ann Mendoza OT 02/25/19 -          Occupational Therapy Education     Title: PT OT SLP Therapies (In Progress)     Topic: Occupational Therapy (In Progress)     Point: ADL training (Done)     Description: Instruct learner(s) on proper safety adaptation and remediation techniques during self care or transfers.   Instruct in proper use of assistive devices.    Learning Progress Summary           Patient Acceptance, E, VU by SK at 7/11/2019  1:19 PM    Comment:  educate pt and family on OT, importance of therapy, DME, ex   Family Acceptance, E, VU by SK at 7/11/2019  1:19 PM    Comment:  educate pt and family on OT, importance of therapy, DME, ex                   Point: Home exercise program (Done)     Description: Instruct learner(s) on appropriate technique for monitoring, assisting and/or progressing therapeutic exercises/activities.    Learning Progress Summary           Patient Acceptance, E, VU by SK at 7/11/2019  1:19 PM    Comment:  educate pt and family on OT, importance of therapy, DME, ex   Family Acceptance, E, VU by SK at 7/11/2019  1:19 PM    Comment:  educate pt and family on OT, importance of therapy, DME, ex                               User Key     Initials Effective Dates Name Provider Type Discipline    SK 02/25/19 -  Ruth Ann Mendoza OT Occupational Therapist OT                  OT Recommendation and Plan  Outcome Summary/Treatment Plan (OT)  Anticipated Discharge Disposition (OT): skilled nursing facility, assisted living facility (senior living)  Patient/Family Concerns, Anticipated Discharge Disposition (OT): family states concerns with pt not being motivated to get up and partipate in therapy  Therapy Frequency (OT Eval): 5 times/wk  Plan of Care Review  Plan of Care Reviewed With: patient, family  Plan of Care Reviewed With: patient, family  Outcome Summary: Pt 80 yo female admitted to [facility] with generalized  weakness, BLE swelling who presents to OT with decreased functional mobility and independence with ADLs.  Pt just moved into an Assisted living last week.  Pt Currently Mod-Max  A Supine to sit, requiring assist to manage LEs, Total A for LBD, grooming with set-up.  Pt unable to sit up long secondary to increased abdominal pain and coughing attack so requested to lie back down.  Pt will benefit from skilled OT to address deficits and increase Ind with ADLs and, depending on improvements, SNF vs AL upon d/c.     Outcome Measures     Row Name 07/11/19 1300 07/11/19 0900          How much help from another person do you currently need...    Turning from your back to your side while in flat bed without using bedrails?  --  2  -LH     Moving from lying on back to sitting on the side of a flat bed without bedrails?  --  2  -LH     Moving to and from a bed to a chair (including a wheelchair)?  --  2  -LH     Standing up from a chair using your arms (e.g., wheelchair, bedside chair)?  --  2  -LH     Climbing 3-5 steps with a railing?  --  1  -LH     To walk in hospital room?  --  1  -     AM-PAC 6 Clicks Score (PT)  --  10  -        How much help from another is currently needed...    Putting on and taking off regular lower body clothing?  1  -SK  --     Bathing (including washing, rinsing, and drying)  2  -SK  --     Toileting (which includes using toilet bed pan or urinal)  1  -SK  --     Putting on and taking off regular upper body clothing  2  -SK  --     Taking care of personal grooming (such as brushing teeth)  3  -SK  --     Eating meals  4  -SK  --     AM-PAC 6 Clicks Score (OT)  13  -SK  --        Functional Assessment    Outcome Measure Options  AM-PAC 6 Clicks Daily Activity (OT)  -SK  AM-PAC 6 Clicks Basic Mobility (PT)  -       User Key  (r) = Recorded By, (t) = Taken By, (c) = Cosigned By    Initials Name Provider Type     Liat Machuca, PT Physical Therapist    Ruth Ann Flaherty, OT Occupational  Therapist          Time Calculation:   Time Calculation- OT     Row Name 07/11/19 1320             Time Calculation- OT    OT Start Time  1005  -SK      OT Stop Time  1036  -SK      OT Time Calculation (min)  31 min  -SK      Total Timed Code Minutes- OT  24 minute(s)  -SK        User Key  (r) = Recorded By, (t) = Taken By, (c) = Cosigned By    Initials Name Provider Type    SK Ruth Ann Mendoza OT Occupational Therapist        Therapy Charges for Today     Code Description Service Date Service Provider Modifiers Qty    [id number]  OT EVAL LOW COMPLEXITY 2 7/11/2019 Ruth Ann Mendoza OT GO 1    [id number]  OT SELF CARE/MGMT/TRAIN EA 15 MIN 7/11/2019 Ruth Ann Mendoza OT GO 2               Ruth Ann Mendoza OT  7/11/2019

## 2019-07-11 NOTE — NURSING NOTE
Patient refused standing scale yesterday, bed scale is not working properly. Standing scale weight today.

## 2019-07-11 NOTE — THERAPY EVALUATION
Acute Care - Physical Therapy Initial Evaluation  [facility]     Patient Name: Renuka Patel  : 1940  MRN: [id number]  Today's Date: 2019   Onset of Illness/Injury or Date of Surgery: 07/10/19  Date of Referral to PT: 19  Referring Physician: Gabriel      Admit Date: 7/10/2019    Visit Dx:     ICD-10-CM ICD-9-CM   1. Generalized weakness R53.1 780.79     Patient Active Problem List   Diagnosis   • Abdominal aortic aneurysm (CMS/HCC)   • Chronic coronary artery disease   • Atrial fibrillation (CMS/HCC)   • Cardiomyopathy (CMS/HCC)   • Cancer of transverse colon (CMS/HCC)   • Acute on chronic combined systolic and diastolic CHF (congestive heart failure) (CMS/HCC)   • Type 2 diabetes with stage 3 chronic kidney disease GFR 30-59 (CMS/HCC)   • Shortness of breath   • Hyperlipidemia   • Hypertension   • Hernia of anterior abdominal wall   • Vitamin D deficiency   • Iron deficiency anemia refractory to iron therapy   • Anemia due to stage 3 chronic kidney disease treated with erythropoietin (CMS/HCC)   • HAYDEN (obstructive sleep apnea)   • Morbid obesity with BMI of 40.0-44.9, adult (CMS/HCC)   • COPD (chronic obstructive pulmonary disease) (CMS/HCC)   • Mitral regurgitation, moderate   • Uncontrolled type 2 diabetes mellitus with complication, with long-term current use of insulin (CMS/HCC)   • H/O cardiac pacemaker   • Stage 3 chronic kidney disease (CMS/HCC)   • Malabsorption of iron   • Generalized weakness     Past Medical History:   Diagnosis Date   • Abdominal aortic aneurysm (CMS/HCC)     3.6 cm infrarenal 2014   • Acute bronchitis    • Anemia     Secondary to stage 3 chronic kidney disease and hx of iron deficiency; followed by Dr. Manuel Hart   • Anxiety    • Atrial fibrillation (CMS/HCC) 2010    Previous cardioversion; status post AV node ablation by Dr. Bear Rodrigues 2012   • Back pain    • Breast cancer (CMS/HCC)     Stage I, status post lumpectomy; history of radiation   •  CAD (coronary artery disease)    • Cardiomyopathy (CMS/HCC)    • CHF (congestive heart failure) (CMS/HCC)     Acute on chronic systolic and diastolic; followed by Dr. Rhiannon Rios   • Chronic kidney disease     Followed by Dr. Anna Gerardo    • Colon cancer (CMS/HCC)     Stage I, resected 01/27/2014   • COPD (chronic obstructive pulmonary disease) (CMS/HCC)     Oxygen dependent; followed by Dr. Reza   • Esophageal reflux    • Hernia of anterior abdominal wall 3/11/2016   • Hyperlipidemia    • Hypertension    • Joint pain     IN THE RIGHT KNEE   • Mass of adrenal gland (CMS/HCC)     lesion solitary, CT thereof   • Morbid obesity (CMS/HCC)    • Non-rheumatic mitral regurgitation    • NSTEMI (non-ST elevated myocardial infarction) (CMS/HCC) 11/2013   • Oral thrush    • HAYDEN on CPAP     Followed by Dr. Reza   • Osteoarthritis    • Pneumonia     onset date: 01 Mar 2011, Severe pneumonia w mechanical ventilation, treated at [facility]. [facility]   • Shortness of breath    • Sick sinus syndrome (CMS/HCC)     Severe bradycardia; status post pacemaker implantation January 2011   • Strong family history of breast cancer    • Type 2 diabetes mellitus (CMS/HCC)    • Ventral hernia    • Vitamin D deficiency      Past Surgical History:   Procedure Laterality Date   • AV NODE ABLATION  05/2012    Dr. Bear Rodrigues   • BACK SURGERY     • BREAST BIOPSY     • BREAST LUMPECTOMY     • BREAST SURGERY     • CARDIAC PACEMAKER PLACEMENT     • COLON SURGERY  01/27/2014    resection for colon cancer   • COLONOSCOPY  07/10/2015    normal ileum, liopmatous ileocecal valve, diverticulosis throughout entire colon, colonic ulceration, IH, mixed TA/hyperplastic polyps   • CORONARY ANGIOPLASTY WITH STENT PLACEMENT  11/2013    2.25×15 mm drug-eluting stent to the second diagonal of the LAD, 2.75×18 mm Xience drug-eluting stent to the mid LAD, and 3.5×23 mm Xience drug-eluting stent to the proximal LAD   • ENDOSCOPY  07/10/2015     erythematous mucosa in stomach, no gross lesions in duodenum, proximal white plaques   • HAND SURGERY     • HYSTERECTOMY     • TIBIA FRACTURE SURGERY      left   • TOE SURGERY      bilat all toenails removed x3 r/t fungus        PT ASSESSMENT (last 12 hours)      Physical Therapy Evaluation     Row Name 07/11/19 0914          PT Evaluation Time/Intention    Subjective Information  no complaints  -     Document Type  evaluation  -     Mode of Treatment  individual therapy;physical therapy  -     Patient Effort  good  -     Symptoms Noted During/After Treatment  none  -     Row Name 07/11/19 0914          General Information    Patient Profile Reviewed?  yes  -     Onset of Illness/Injury or Date of Surgery  07/10/19  -     Referring Physician  Gabriel  -     Patient Observations  alert;cooperative;agree to therapy  -     Patient/Family Observations  pt supine in bed no acute distress, son bedside  -     Prior Level of Function  -- immobile currently, previously amb w rwx or cane  -     Equipment Currently Used at Home  walker, rolling;cane, straight  -     Pertinent History of Current Functional Problem  CHF, SOA  -     Existing Precautions/Restrictions  fall  -     Risks Reviewed  patient:  -     Benefits Reviewed  patient:  -     Row Name 07/11/19 0914          Cognitive Assessment/Intervention- PT/OT    Orientation Status (Cognition)  oriented x 3  -     Follows Commands (Cognition)  WFL  -     Row Name 07/11/19 0914          Bed Mobility Assessment/Treatment    Bed Mobility Assessment/Treatment  supine-sit;sit-supine  -     Supine-Sit [place] (Bed Mobility)  moderate assist (50% patient effort);verbal cues;nonverbal cues (demo/gesture)  [facility]     Sit-Supine Yell (Bed Mobility)  moderate assist (50% patient effort);2 person assist;verbal cues;nonverbal cues (demo/gesture);maximum assist (25% patient effort)  -     Assistive Device (Bed Mobility)  bed rails;draw  sheet;head of bed elevated  -[facility] Name 07/11/19 0914          Transfer Assessment/Treatment    Transfer Assessment/Treatment  sit-stand transfer;stand-sit transfer  -     Sit-Stand [place] (Transfers)  moderate assist (50% patient effort);minimum assist (75% patient effort);2 person assist;verbal cues;nonverbal cues (demo/gesture)  -     Stand-Sit [place] (Transfers)  minimum assist (75% patient effort);moderate assist (50% patient effort);2 person assist;verbal cues;nonverbal cues (demo/gesture)  -[facility] Name 07/11/19 0914          Sit-Stand Transfer    Assistive Device (Sit-Stand Transfers)  walker, front-wheeled  -[facility] Name 07/11/19 0914          Stand-Sit Transfer    Assistive Device (Stand-Sit Transfers)  walker, front-wheeled  -[facility] Name 07/11/19 0914          Gait/Stairs Assessment/Training    Comment (Gait/Stairs)  NT, assisted nsg w pt sit<->standing bedside for standing scale. pt fatigues quickly  -[facility] Name 07/11/19 0914          General ROM    GENERAL ROM COMMENTS  BLEs AROM grossly impaired 2' swelling  -[facility] Name 07/11/19 0914          MMT (Manual Muscle Testing)    General MMT Comments  BLEs grossly 3/5  -[facility] Name 07/11/19 0914          Motor Assessment/Intervention    Additional Documentation  Therapeutic Exercise (Group);Therapeutic Exercise Interventions (Group);Balance (Group);Balance Interventions (Group)  -[facility] Name 07/11/19 0914          Therapeutic Exercise    Therapeutic Exercise  seated, lower extremities;supine, lower extremities  -     Additional Documentation  Therapeutic Exercise (Row)  -[facility] Name 07/11/19 0914          Lower Extremity Seated Therapeutic Exercise    Performed, Seated Lower Extremity (Therapeutic Exercise)  LAQ (long arc quad), knee extension  -     Exercise Type, Seated Lower Extremity (Therapeutic Exercise)  AROM (active range of motion);AAROM (active assistive range of motion)  -     Sets/Reps  Detail, Seated Lower Extremity (Therapeutic Exercise)  1/5  -LH     Row Name 07/11/19 0914          Lower Extremity Supine Therapeutic Exercise    Performed, Supine Lower Extremity (Therapeutic Exercise)  ankle pumps;heel slides  -     Exercise Type, Supine Lower Extremity (Therapeutic Exercise)  AROM (active range of motion)  -     Sets/Reps Detail, Supine Lower Extremity (Therapeutic Exercise)  1/5  -LH     Row Name 07/11/19 0914          Balance    Balance  static sitting balance;static standing balance  -LH     Row Name 07/11/19 0914          Static Sitting Balance    Level of [place] (Unsupported Sitting, Static Balance)  contact guard assist  -     Sitting Position (Unsupported Sitting, Static Balance)  sitting on edge of bed  -     Time Able to Maintain Position (Unsupported Sitting, Static Balance)  more than 5 minutes  -LH     Row Name 07/11/19 0914          Static Standing Balance    Level of [place] (Supported Standing, Static Balance)  minimal assist, 75% patient effort;moderate assist, 50 to 74% patient effort;2 person assist  -     Time Able to Maintain Position (Supported Standing, Static Balance)  15 to 30 seconds x2  -     Assistive Device Utilized (Supported Standing, Static Balance)  walker, rolling and standing scale  -LH     Row Name 07/11/19 0914          Pain Assessment    Additional Documentation  Pain Scale: FACES Pre/Post-Treatment (Group)  -LH     Row Name 07/11/19 0914          Pain Scale: Numbers Pre/Post-Treatment    Pain Location - Side  Bilateral  -     Pain Location - Orientation  generalized  -     Pain Location  -- legs  -LH     Row Name 07/11/19 0914          Pain Scale: FACES Pre/Post-Treatment    Pain: FACES Scale, Pretreatment  4-->hurts little more  -     Pain: FACES Scale, Post-Treatment  6-->hurts even more  -LH     Row Name 07/11/19 0914          Plan of Care Review    Plan of Care Reviewed With  patient  -LH     Row Name 07/11/19 0914           Physical Therapy Clinical Impression    Date of Referral to PT  07/11/19  -     Criteria for Skilled Interventions Met (PT Clinical Impression)  yes  -     Pathology/Pathophysiology Noted (Describe Specifically for Each System)  musculoskeletal;neuromuscular;cardiovascular  -     Impairments Found (describe specific impairments)  ROM;joint integrity and mobility;gait, locomotion, and balance;aerobic capacity/endurance  -     Functional Limitations in Following Categories (Describe Specific Limitations)  self-care;home management  -     Rehab Potential (PT Clinical Summary)  fair, will monitor progress closely  -     Row Name 07/11/19 0914          Physical Therapy Goals    Bed Mobility Goal Selection (PT)  bed mobility, PT goal 1  -     Transfer Goal Selection (PT)  transfer, PT goal 1  -     Gait Training Goal Selection (PT)  gait training, PT goal 1  -[facility] Name 07/11/19 0914          Bed Mobility Goal 1 (PT)    Activity/Assistive Device (Bed Mobility Goal 1, PT)  bed mobility activities, all  -     [place] Level/Cues Needed (Bed Mobility Goal 1, PT)  minimum assist (75% or more patient effort)  -     Time Frame (Bed Mobility Goal 1, PT)  1 week  -[facility] Name 07/11/19 0914          Transfer Goal 1 (PT)    Activity/Assistive Device (Transfer Goal 1, PT)  transfers, all;walker, rolling  -     [place] Level/Cues Needed (Transfer Goal 1, PT)  minimum assist (75% or more patient effort)  -     Time Frame (Transfer Goal 1, PT)  1 week  -[facility] Name 07/11/19 0914          Gait Training Goal 1 (PT)    Activity/Assistive Device (Gait Training Goal 1, PT)  walker, rolling  -     [place] Level (Gait Training Goal 1, PT)  minimum assist (75% or more patient effort)  -     Distance (Gait Goal 1, PT)  80  -     Time Frame (Gait Training Goal 1, PT)  1 week  -[facility] Name 07/11/19 0914          Positioning and Restraints    Pre-Treatment Position  in bed  -     Post  Treatment Position  bed  -LH     In Bed  supine;call light within reach;encouraged to call for assist;with family/caregiver alarm not working, RN bedside and aware  -       User Key  (r) = Recorded By, (t) = Taken By, (c) = Cosigned By    Initials Name Provider Type     Liat Machuca, PT Physical Therapist        Physical Therapy Education     Title: PT OT SLP Therapies (In Progress)     Topic: Physical Therapy (In Progress)     Point: Mobility training (Done)     Learning Progress Summary           Patient Acceptance, E, VU,NR by  at 7/11/2019  9:29 AM                   Point: Home exercise program (Done)     Learning Progress Summary           Patient Acceptance, E, VU,NR by  at 7/11/2019  9:29 AM                               User Key     Initials Effective Dates Name Provider Type Discipline     04/03/18 -  Liat Machuca, PT Physical Therapist PT              PT Recommendation and Plan  Anticipated Discharge Disposition (PT): skilled nursing facility  Planned Therapy Interventions (PT Eval): balance training, bed mobility training, gait training, home exercise program, ROM (range of motion), stair training, strengthening, stretching, transfer training  Therapy Frequency (PT Clinical Impression): daily  Outcome Summary/Treatment Plan (PT)  Anticipated Discharge Disposition (PT): skilled nursing facility  Plan of Care Reviewed With: patient  Outcome Summary: pt presents w gross generalized weakness, BLE edema, fatigues w sit<->standing tsf bedside, 2 person assist rwx. baseline can use rwx/cane however recently immobile. pt may benefit from skilled PT acutely to address functional deficits and assist w DC planning, anticipate SNU at [place].   Outcome Measures     Row Name 07/11/19 0900             How much help from another person do you currently need...    Turning from your back to your side while in flat bed without using bedrails?  2  -LH      Moving from lying on back to sitting on the side of a flat  bed without bedrails?  2  -LH      Moving to and from a bed to a chair (including a wheelchair)?  2  -LH      Standing up from a chair using your arms (e.g., wheelchair, bedside chair)?  2  -LH      Climbing 3-5 steps with a railing?  1  -LH      To walk in hospital room?  1  -      AM-PAC 6 Clicks Score (PT)  10  -         Functional Assessment    Outcome Measure Options  AM-PAC 6 Clicks Basic Mobility (PT)  -        User Key  (r) = Recorded By, (t) = Taken By, (c) = Cosigned By    Initials Name Provider Type     Liat Machuca, PT Physical Therapist         Time Calculation:   PT Charges     Row Name 07/11/19 0931             Time Calculation    Start Time  0853  -      Stop Time  0912  -      Time Calculation (min)  19 min  -      PT Received On  07/11/19  -      PT - Next Appointment  07/12/19  -      PT Goal Re-Cert Due Date  07/18/19  -        User Key  (r) = Recorded By, (t) = Taken By, (c) = Cosigned By    Initials Name Provider Type     Liat Machuca, PT Physical Therapist        Therapy Charges for Today     Code Description Service Date Service Provider Modifiers Qty    12008564030 HC PT THER SUPP EA 15 MIN 7/11/2019 Liat Machuca, PT GP 1    51756211100 HC PT EVAL MOD COMPLEXITY 2 7/11/2019 Liat Machuca, PT GP 1    22895792011 HC PT THER PROC EA 15 MIN 7/11/2019 Liat Machuca, PT GP 1          PT G-Codes  Outcome Measure Options: AM-PAC 6 Clicks Basic Mobility (PT)  AM-PAC 6 Clicks Score (PT): 10      Liat Machuca PT  7/11/2019

## 2019-07-11 NOTE — CONSULTS
[facility]                                                                                             Nephrology Initial Consult Note    Patient Identification:  Name: Renuka Patel MRN: [id number]  Age: 79 y.o. : 1940  Sex: female  Date:2019    Requesting Physician: As per consult order.  Reason for Consultation: Chronic kidney disease, acute CHF exacerbation  Information from:patient/ family/ chart      History of Present Illness: This is a 79 y.o. year old female with a history of stage III chronic kidney disease followed in the office by my partner Dr. Anna Gerardo, last seen about 3 months ago at which time renal function was stable with a creatinine of 1.1 which is below her usual baseline creatinine of 1.3-1.5.  She was recently transitioned to oral Bumex and states that since that time she has had progressive fluid retention with about a 20 to 30 pound weight gain over the last several months.  She also complains of some shortness of breath, significant abdominal distention, PND and orthopnea.  No chest pain, no fevers or chills.  Nothing makes the symptoms any better, no real response to diuretics, shortness of breath worse with exertion quality described as generalized fatigue, no pain or radiation, moderate severity, gradual onset with no associated nausea vomiting diarrhea constipation fevers chills or chest pain.  States compliance with her diet, low-salt diet and with her medications but does admit to drinking quite a bit of fluid.      The following medical history and medications personally reviewed by me:    Problem List:   Patient Active Problem List    Diagnosis   • Generalized weakness [R53.1]   • Malabsorption of iron [K90.9]   • Stage 3 chronic kidney disease (CMS/HCC) [N18.3]   • H/O cardiac pacemaker [Z95.0]   • Uncontrolled type 2 diabetes mellitus with complication,  with long-term current use of insulin (CMS/HCC) [E11.8, E11.65, Z79.4]   • Morbid obesity with BMI of 40.0-44.9, adult (CMS/Formerly [facility]) [E66.01, Z68.41]   • COPD (chronic obstructive pulmonary disease) (CMS/HCC) [J44.9]   • Mitral regurgitation, moderate [I34.0]   • HAYDEN (obstructive sleep apnea) [G47.33]   • Iron deficiency anemia refractory to iron therapy [D50.8]   • Anemia due to stage 3 chronic kidney disease treated with erythropoietin (CMS/HCC) [N18.3, D63.1]   • Abdominal aortic aneurysm (CMS/HCC) [I71.4]   • Chronic coronary artery disease [I25.10]   • Atrial fibrillation (CMS/HCC) [I48.91]   • Cardiomyopathy (CMS/HCC) [I42.9]   • Cancer of transverse colon (CMS/[facility]) [C18.4]   • Acute on chronic combined systolic and diastolic CHF (congestive heart failure) (CMS/[facility]) [I50.43]   • Type 2 diabetes with stage 3 chronic kidney disease GFR 30-59 (CMS/[facility]) [E11.22, N18.3]   • Shortness of breath [R06.02]   • Hyperlipidemia [E78.5]   • Hypertension [I10]   • Hernia of anterior abdominal wall [K43.9]   • Vitamin D deficiency [E55.9]       Past Medical History:  Past Medical History:   Diagnosis Date   • Abdominal aortic aneurysm (CMS/HCC)     3.6 cm infrarenal January 2014   • Acute bronchitis    • Anemia     Secondary to stage 3 chronic kidney disease and hx of iron deficiency; followed by Dr. Manuel Hart   • Anxiety    • Atrial fibrillation (CMS/HCC) 12/2010    Previous cardioversion; status post AV node ablation by Dr. Bear Rodrigues 5/2012   • Back pain    • Breast cancer (CMS/HCC)     Stage I, status post lumpectomy; history of radiation   • CAD (coronary artery disease)    • Cardiomyopathy (CMS/HCC)    • CHF (congestive heart failure) (CMS/HCC)     Acute on chronic systolic and diastolic; followed by Dr. Rhiannon Rios   • Chronic kidney disease     Followed by Dr. Anna Gerardo    • Colon cancer (CMS/HCC)     Stage I, resected 01/27/2014   • COPD (chronic obstructive pulmonary disease) (CMS/HCC)     Oxygen  dependent; followed by Dr. Reza   • Esophageal reflux    • Hernia of anterior abdominal wall 3/11/2016   • Hyperlipidemia    • Hypertension    • Joint pain     IN THE RIGHT KNEE   • Mass of adrenal gland (CMS/HCC)     lesion solitary, CT thereof   • Morbid obesity (CMS/HCC)    • Non-rheumatic mitral regurgitation    • NSTEMI (non-ST elevated myocardial infarction) (CMS/HCC) 11/2013   • Oral thrush    • HAYDEN on CPAP     Followed by Dr. Reza   • Osteoarthritis    • Pneumonia     onset date: 01 Mar 2011, Severe pneumonia w mechanical ventilation, treated at [facility]   • Shortness of breath    • Sick sinus syndrome (CMS/HCC)     Severe bradycardia; status post pacemaker implantation January 2011   • Strong family history of breast cancer    • Type 2 diabetes mellitus (CMS/HCC)    • Ventral hernia    • Vitamin D deficiency        Past Surgical History:  Past Surgical History:   Procedure Laterality Date   • AV NODE ABLATION  05/2012    Dr. Bear Rodrigues   • BACK SURGERY     • BREAST BIOPSY     • BREAST LUMPECTOMY     • BREAST SURGERY     • CARDIAC PACEMAKER PLACEMENT     • COLON SURGERY  01/27/2014    resection for colon cancer   • COLONOSCOPY  07/10/2015    normal ileum, liopmatous ileocecal valve, diverticulosis throughout entire colon, colonic ulceration, IH, mixed TA/hyperplastic polyps   • CORONARY ANGIOPLASTY WITH STENT PLACEMENT  11/2013    2.25×15 mm drug-eluting stent to the second diagonal of the LAD, 2.75×18 mm Xience drug-eluting stent to the mid LAD, and 3.5×23 mm Xience drug-eluting stent to the proximal LAD   • ENDOSCOPY  07/10/2015    erythematous mucosa in stomach, no gross lesions in duodenum, proximal white plaques   • HAND SURGERY     • HYSTERECTOMY     • TIBIA FRACTURE SURGERY      left   • TOE SURGERY      bilat all toenails removed x3 r/t fungus        Home Meds:   Medications Prior to Admission   Medication Sig Dispense Refill Last Dose   • albuterol (PROVENTIL) (2.5 MG/3ML) 0.083%  nebulizer solution INHALE THE CONTENTS OF ONE VIAL (3ML) EVERY 4 HOURS AS NEEDED  5 7/10/2019 at Unknown time   • allopurinol (ZYLOPRIM) 300 MG tablet Take 1 tablet by mouth Daily. 90 tablet 3 7/10/2019 at Unknown time   • atorvastatin (LIPITOR) 20 MG tablet Take 1 tablet by mouth Daily. 90 tablet 1 7/9/2019 at Unknown time   • carvedilol (COREG) 25 MG tablet TAKE ONE TABLET BY MOUTH IN THE MORNING, TAKE 1/2 TABLET AT NOON AND AT BEDTIME 180 tablet 1 7/10/2019 at Unknown time   • dabigatran etexilate (PRADAXA) 150 MG capsu Take 1 capsule by mouth Every 12 (Twelve) Hours. 48 capsule 0 7/10/2019 at Unknown time   • diphenhydrAMINE (BENADRYL) 25 MG tablet Take 25 mg by mouth Every 6 (Six) Hours As Needed for Itching.   Past Week at Unknown time   • Fluticasone-Umeclidin-Vilant (TRELEGY ELLIPTA IN) Inhale Daily.   7/10/2019 at Unknown time   • furosemide (LASIX) 40 MG tablet Take 40 mg by mouth 3 (Three) Times a Day. One tab in the morning, second tab at 1100, third tab at 1500      • GLOBAL EASE INJECT PEN NEEDLES 32G X 4 MM misc USE AS DIRECTED 100 each 0 7/10/2019 at Unknown time   • levocetirizine (XYZAL) 5 MG tablet Take 5 mg by mouth Every Evening.   7/10/2019 at Unknown time   • Melatonin 10 MG capsule Take 1 capsule by mouth At Night As Needed.   Past Week at Unknown time   • omeprazole (priLOSEC) 40 MG capsule Take 40 mg by mouth Daily.   7/10/2019 at Unknown time   • potassium chloride (K-DUR,KLOR-CON) 20 MEQ CR tablet Take 1 tablet by mouth Daily. 90 tablet 3 7/10/2019 at Unknown time   • TRADJENTA 5 MG tablet tablet Take 1 tablet by mouth Daily. 90 tablet 1 7/10/2019 at Unknown time   • traZODone (DESYREL) 100 MG tablet Take 100 mg by mouth At Night As Needed for Sleep.      • fluticasone (FLONASE) 50 MCG/ACT nasal spray 2 sprays into each nostril Daily.   Unknown at Unknown time   • Insulin Glargine 300 UNIT/ML solution pen-injector Inject 55 Units under the skin into the appropriate area as directed Daily.  (Patient taking differently: Inject 45 Units under the skin into the appropriate area as directed Daily.) 3 pen 5 Taking   • ipratropium-albuterol (DUO-NEB) 0.5-2.5 mg/mL nebulizer INHALE THE contents OF ONE vial using nebulizer FOUR TIMES DAILY AS NEEDED  5 Taking   • nitroglycerin (NITROSTAT) 0.4 MG SL tablet PLACE ONE TABLET UNDER TONGUE FOR CHEST PAIN.  MAY REPEAT EVERY 5 MINUTES FOR 3  DOSES 25 tablet 0 Unknown at Unknown time       Current Meds:   Current Facility-Administered Medications   Medication Dose Route Frequency Provider Last Rate Last Dose   • albuterol (PROVENTIL) nebulizer solution 0.5% 2.5 mg/0.5mL  2.5 mg Nebulization Q4H - RT Bhupendra De MD       • allopurinol (ZYLOPRIM) tablet 100 mg  100 mg Oral Daily Bhupendra De MD   100 mg at 07/11/19 0814   • bumetanide (BUMEX) injection 2 mg  2 mg Intravenous Q8H Rhiannon Rios MD   2 mg at 07/11/19 1303   • carvedilol (COREG) tablet 12.5 mg  12.5 mg Oral Daily Bhupendra De MD   12.5 mg at 07/10/19 2133   • carvedilol (COREG) tablet 12.5 mg  12.5 mg Oral Daily Bhupendra De MD   12.5 mg at 07/11/19 1305   • carvedilol (COREG) tablet 25 mg  25 mg Oral Daily Bhupendra De MD   25 mg at 07/11/19 0814   • ceFAZolin (ANCEF) IVPB 1 g  1 g Intravenous Q8H Bhupendra De MD   1 g at 07/11/19 1304   • guaiFENesin (MUCINEX) 12 hr tablet 600 mg  600 mg Oral Q12H Bhupendra De MD   600 mg at 07/11/19 0814   • insulin glargine (LANTUS) injection 55 Units  55 Units Subcutaneous Daily Bhupendra De MD   45 Units at 07/11/19 1259   • insulin lispro (humaLOG) injection 0-7 Units  0-7 Units Subcutaneous 4x Daily With Meals & Nightly Bhupendra De MD   2 Units at 07/11/19 1259   • linagliptin (TRADJENTA) tablet 5 mg  5 mg Oral Daily Bhupendra De MD   5 mg at 07/11/19 0814   • nitroglycerin (NITROSTAT) SL tablet 0.4 mg  0.4 mg Sublingual Q5 Min PRN Bhupendra De MD       • pantoprazole (PROTONIX) EC tablet 40 mg  40 mg Oral QAM Bhupendra De MD   40 mg at  07/11/19 0635   • potassium chloride (MICRO-K) CR capsule 20 mEq  20 mEq Oral BID With Meals Bhupendra De MD   20 mEq at 07/11/19 0814   • traZODone (DESYREL) tablet 100 mg  100 mg Oral Nightly PRN Bhupendra De MD       • TRELEGY (Fluticasone-Umeclidin-Vilant)100-62.5-25 MCG/INH IN  1 each Inhalation Daily - RT Bhupendra De MD           Allergies:  Allergies   Allergen Reactions   • Codeine Itching     Edema   • Hydralazine Hcl      BP drops.   • Lisinopril Cough     Edema       Social History:   Social History     Socioeconomic History   • Marital status:      Spouse name: Not on file   • Number of children: Not on file   • Years of education: Not on file   • Highest education level: Not on file   Occupational History   • Occupation: Retired   Tobacco Use   • Smoking status: Former Smoker     Packs/day: 1.00     Years: 25.00     Pack years: 25.00     Last attempt to quit: 2004     Years since quitting: 15.5   • Smokeless tobacco: Never Used   • Tobacco comment: caffeine use   Substance and Sexual Activity   • Alcohol use: Yes     Comment: Rare   • Drug use: No   • Sexual activity: Defer        Family History:  Family History   Problem Relation Age of Onset   • Breast cancer Mother 60   • Breast cancer Sister 50   • Arrhythmia Sister    • Hypertension Sister    • Brain cancer Father 38   • Breast cancer Sister 40   • Heart attack Brother    • Hypertension Brother    • Diabetes Son    • Hypertension Son    • Heart disease Maternal Grandmother         Review of Systems: as per HPI, in addition:    General:      Complains of weakness / fatigue,                       No fevers / chills                       no weight loss  HEENT:       no dysphagia / odynophagia  Neck:           normal range of motion, no swelling  Respiratory: no cough / congestion                      Increasing shortness of air                       No wheezing  CV:              No chest pain                       No  "palpitations  Abdomen/GI: no nausea / vomiting                      No diarrhea / constipation                      No abdominal pain  :             no dysuria / urinary frequency                       No urgency, normal output  Endocrine:   no polyuria / polydipsia,                      No heat or cold intolerance  Skin:           no rashes or skin breakdown   Vascular:   Complains of significant edema                     No claudication  Psych:        no depression/ anxiety  Neuro:        no focal weakness, no seizures  Musculoskeletal: no joint pain or deformities      Physical Exam:  Vitals:   Temp (24hrs), Av.4 °F (36.9 °C), Min:97.9 °F (36.6 °C), Max:98.6 °F (37 °C)    /40 (BP Location: Left arm, Patient Position: Lying)   Pulse 69   Temp 97.9 °F (36.6 °C) (Oral)   Resp 18   Ht 162.6 cm (64\")   Wt 123 kg (271 lb 8 oz)   SpO2 100%   BMI 46.60 kg/m²   Intake/Output:     Intake/Output Summary (Last 24 hours) at 2019 1358  Last data filed at 2019 1327  Gross per 24 hour   Intake 480 ml   Output 1100 ml   Net -620 ml        Wt Readings from Last 1 Encounters:   19 0907 123 kg (271 lb 8 oz)   07/10/19 1704 112 kg (246 lb 6.4 oz)       Exam:    General Appearance:  Awake, alert, oriented x3, no acute distress  Obese, chronically ill-appearing   Head and Face:  Normocephalic, atraumatic, mucus membranes moist, oropharynx clear   Eyes:  No icterus, pupils equal round and reactive to light, extraocular movements intact    ENMT: Moist mucosa, tongue symmetric    Neck: Supple  no jugular venous distention  no thyromegaly   Pulmonary:  Respiratory effort: Normal  Auscultation of lungs: Clear bilaterally  No wheezes  No rhonchi  Good air movement, good expansion   Chest wall:  No tenderness or deformity   Cardiovascular:  Auscultation of the heart: Normal rhythm, no murmurs  3+, anasarca edema of extremities    Abdomen:  Abdomen: soft, non-tender, distended, likely with fluid, normal bowel " sounds all four quadrants, no masses   Liver and spleen: no hepatosplenomegaly   Musculoskeletal: Digits and nails: normal  Normal range of motion  No joint swelling or gross deformities    Skin: Skin inspection: color normal, no visible rashes or lesions  Skin palpation: texture, turgor normal, no palpable lesions   Lymphatic:  no cervical lymphadenopathy    Psychiatric: Judgement and insight: normal  Orientation to person place and time: normal  Mood and affect: normal       DATA:  Radiology and Labs:  The following labs independently reviewed by me  Old records independently reviewed showing stage III chronic kidney disease, office notes reviewed, recent creatinine 1.1 with a baseline of 1.3-1.5  The following radiologic studies independently viewed by me, findings chest x-ray with likely bilateral effusions, cardiomegaly, mild interstitial prominence likely pulmonary edema  Interval notes, chart personally reviewed by me.   New problems include acute CHF exacerbation  Discussed with patient and multiple family members at bedside who provides some additional history  Platelet count low normal    Risk/ complexity of medical care/ medical decision making  High risk, significant fluid overload requiring Bumex drip, high risk medication requiring close monitoring of renal function volume and electrolyte      Labs:   Recent Results (from the past 24 hour(s))   POC Glucose Once    Collection Time: 07/10/19  4:58 PM   Result Value Ref Range    Glucose 126 70 - 130 mg/dL   Comprehensive Metabolic Panel    Collection Time: 07/10/19  5:55 PM   Result Value Ref Range    Glucose 118 (H) 65 - 99 mg/dL    BUN 69 (H) 8 - 23 mg/dL    Creatinine 1.97 (H) 0.57 - 1.00 mg/dL    Sodium 142 136 - 145 mmol/L    Potassium 4.9 3.5 - 5.2 mmol/L    Chloride 99 98 - 107 mmol/L    CO2 22.8 22.0 - 29.0 mmol/L    Calcium 8.1 (L) 8.6 - 10.5 mg/dL    Total Protein 5.1 (L) 6.0 - 8.5 g/dL    Albumin 2.40 (L) 3.50 - 5.20 g/dL    ALT (SGPT) 37 (H) 1  - 33 U/L    AST (SGOT) 121 (H) 1 - 32 U/L    Alkaline Phosphatase 1,042 (H) 39 - 117 U/L    Total Bilirubin 1.3 (H) 0.2 - 1.2 mg/dL    eGFR Non African Amer 24 (L) >60 mL/min/1.73    Globulin 2.7 gm/dL    A/G Ratio 0.9 g/dL    BUN/Creatinine Ratio 35.0 (H) 7.0 - 25.0    Anion Gap 20.2 (H) 5.0 - 15.0 mmol/L   Troponin    Collection Time: 07/10/19  5:55 PM   Result Value Ref Range    Troponin T 0.015 0.000 - 0.030 ng/mL   Magnesium    Collection Time: 07/10/19  5:55 PM   Result Value Ref Range    Magnesium 2.4 1.6 - 2.4 mg/dL   TSH    Collection Time: 07/10/19  5:55 PM   Result Value Ref Range    TSH 2.890 0.270 - 4.200 mIU/mL   Uric Acid    Collection Time: 07/10/19  5:55 PM   Result Value Ref Range    Uric Acid 7.5 (H) 2.4 - 5.7 mg/dL   CBC Auto Differential    Collection Time: 07/10/19  5:55 PM   Result Value Ref Range    WBC 17.60 (H) 3.40 - 10.80 10*3/mm3    RBC 3.31 (L) 3.77 - 5.28 10*6/mm3    Hemoglobin 10.0 (L) 12.0 - 15.9 g/dL    Hematocrit 33.3 (L) 34.0 - 46.6 %    .6 (H) 79.0 - 97.0 fL    MCH 30.2 26.6 - 33.0 pg    MCHC 30.0 (L) 31.5 - 35.7 g/dL    RDW 21.7 (H) 12.3 - 15.4 %    RDW-SD 75.7 (H) 37.0 - 54.0 fl    MPV 12.7 (H) 6.0 - 12.0 fL    Platelets 128 (L) 140 - 450 10*3/mm3    Neutrophil % 82.2 (H) 42.7 - 76.0 %    Lymphocyte % 7.9 (L) 19.6 - 45.3 %    Monocyte % 7.5 5.0 - 12.0 %    Eosinophil % 1.1 0.3 - 6.2 %    Basophil % 0.2 0.0 - 1.5 %    Immature Grans % 1.1 (H) 0.0 - 0.5 %    Neutrophils, Absolute 14.45 (H) 1.70 - 7.00 10*3/mm3    Lymphocytes, Absolute 1.39 0.70 - 3.10 10*3/mm3    Monocytes, Absolute 1.32 (H) 0.10 - 0.90 10*3/mm3    Eosinophils, Absolute 0.20 0.00 - 0.40 10*3/mm3    Basophils, Absolute 0.04 0.00 - 0.20 10*3/mm3    Immature Grans, Absolute 0.20 (H) 0.00 - 0.05 10*3/mm3    nRBC 0.1 0.0 - 0.2 /100 WBC   Duplex Venous Lower Extremity - Bilateral CAR    Collection Time: 07/10/19  7:36 PM   Result Value Ref Range    Right Common Femoral Spont Y     Right Common Femoral Phasic Y      Right Common Femoral Augment D     Right Common Femoral Competent Y     Right Common Femoral Compress C     Right Saphenofemoral Junction Compress C     Right Proximal Femoral Compress C     Right Mid Femoral Spont Y     Right Mid Femoral Phasic Y     Right Mid Femoral Augment D     Right Mid Femoral Competent Y     Right Mid Femoral Compress C     Right Distal Femoral Compress C     Right Popliteal Spont Y     Right Popliteal Phasic Y     Right Popliteal Augment D     Right Popliteal Competent Y     Right Popliteal Compress C     Right Posterior Tibial Compress C     Right Peroneal Compress C     Right GastronemiusSoleal Compress C     Right Greater Saph AK Compress C     Right Greater Saph BK Compress C     Left Common Femoral Spont Y     Left Common Femoral Phasic Y     Left Common Femoral Augment Y     Left Common Femoral Competent Y     Left Common Femoral Compress C     Left Saphenofemoral Junction Compress C     Left Proximal Femoral Compress C     Left Mid Femoral Spont Y     Left Mid Femoral Phasic Y     Left Mid Femoral Augment Y     Left Mid Femoral Competent Y     Left Mid Femoral Compress C     Left Distal Femoral Compress C     Left Popliteal Spont Y     Left Popliteal Phasic Y     Left Popliteal Augment Y     Left Popliteal Competent Y     Left Popliteal Compress C     Left Posterior Tibial Compress C     Left Peroneal Compress C     Left GastronemiusSoleal Compress C     Left Greater Saph AK Compress C     Left Greater Saph BK Compress C    POC Glucose Once    Collection Time: 07/10/19  8:25 PM   Result Value Ref Range    Glucose 112 70 - 130 mg/dL   Urinalysis With Culture If Indicated - Urine, Clean Catch    Collection Time: 07/10/19  9:28 PM   Result Value Ref Range    Color, UA Yellow Yellow, Straw    Appearance, UA Clear Clear    pH, UA <=5.0 5.0 - 8.0    Specific Gravity, UA 1.016 1.005 - 1.030    Glucose, UA Negative Negative    Ketones, UA Negative Negative    Bilirubin, UA Negative  Negative    Blood, UA Negative Negative    Protein, UA Negative Negative    Leuk Esterase, UA Small (1+) (A) Negative    Nitrite, UA Negative Negative    Urobilinogen, UA 0.2 E.U./dL 0.2 - 1.0 E.U./dL   Respiratory Panel, PCR - Swab, Nasopharynx    Collection Time: 07/10/19  9:28 PM   Result Value Ref Range    ADENOVIRUS, PCR Not Detected Not Detected    Coronavirus 229E Not Detected Not Detected    Coronavirus HKU1 Not Detected Not Detected    Coronavirus NL63 Not Detected Not Detected    Coronavirus OC43 Not Detected Not Detected    Human Metapneumovirus Not Detected Not Detected    Human Rhinovirus/Enterovirus Not Detected Not Detected    Influenza B PCR Not Detected Not Detected    Parainfluenza Virus 1 Not Detected Not Detected    Parainfluenza Virus 2 Not Detected Not Detected    Parainfluenza Virus 3 Not Detected Not Detected    Parainfluenza Virus 4 Not Detected Not Detected    Bordetella pertussis pcr Not Detected Not Detected    Influenza A H1 2009 PCR Not Detected Not Detected    Chlamydophila pneumoniae PCR Not Detected Not Detected    Mycoplasma pneumo by PCR Not Detected Not Detected    Influenza A PCR Not Detected Not Detected    Influenza A H3 Not Detected Not Detected    Influenza A H1 Not Detected Not Detected    RSV, PCR Not Detected Not Detected    Bordetella parapertussis PCR Not Detected Not Detected   Urinalysis, Microscopic Only - Urine, Clean Catch    Collection Time: 07/10/19  9:28 PM   Result Value Ref Range    RBC, UA 0-2 None Seen, 0-2 /HPF    WBC, UA 3-5 (A) None Seen, 0-2 /HPF    Bacteria, UA None Seen None Seen /HPF    Squamous Epithelial Cells, UA 0-2 None Seen, 0-2 /HPF    Hyaline Casts, UA 0-2 None Seen /LPF    Methodology Automated Microscopy    Comprehensive Metabolic Panel    Collection Time: 07/11/19  5:26 AM   Result Value Ref Range    Glucose 71 65 - 99 mg/dL    BUN 71 (H) 8 - 23 mg/dL    Creatinine 1.95 (H) 0.57 - 1.00 mg/dL    Sodium 139 136 - 145 mmol/L    Potassium 4.8  3.5 - 5.2 mmol/L    Chloride 97 (L) 98 - 107 mmol/L    CO2 24.3 22.0 - 29.0 mmol/L    Calcium 8.3 (L) 8.6 - 10.5 mg/dL    Total Protein 5.0 (L) 6.0 - 8.5 g/dL    Albumin 2.20 (L) 3.50 - 5.20 g/dL    ALT (SGPT) 35 (H) 1 - 33 U/L    AST (SGOT) 119 (H) 1 - 32 U/L    Alkaline Phosphatase 1,040 (H) 39 - 117 U/L    Total Bilirubin 1.2 0.2 - 1.2 mg/dL    eGFR Non African Amer 25 (L) >60 mL/min/1.73    Globulin 2.8 gm/dL    A/G Ratio 0.8 g/dL    BUN/Creatinine Ratio 36.4 (H) 7.0 - 25.0    Anion Gap 17.7 (H) 5.0 - 15.0 mmol/L   BNP    Collection Time: 07/11/19  5:26 AM   Result Value Ref Range    proBNP 5,053.0 (H) 5.0-1,800.0 pg/mL   TSH    Collection Time: 07/11/19  5:26 AM   Result Value Ref Range    TSH 3.310 0.270 - 4.200 mIU/mL   Lipid Panel    Collection Time: 07/11/19  5:26 AM   Result Value Ref Range    Total Cholesterol 72 0 - 200 mg/dL    Triglycerides 146 0 - 150 mg/dL    HDL Cholesterol 29 (L) 40 - 60 mg/dL    LDL Cholesterol  14 0 - 100 mg/dL    VLDL Cholesterol 29.2 5 - 40 mg/dL    LDL/HDL Ratio 0.48    Hemoglobin A1c    Collection Time: 07/11/19  5:26 AM   Result Value Ref Range    Hemoglobin A1C 5.40 4.80 - 5.60 %   Troponin    Collection Time: 07/11/19  5:26 AM   Result Value Ref Range    Troponin T 0.015 0.000 - 0.030 ng/mL   CBC Auto Differential    Collection Time: 07/11/19  5:26 AM   Result Value Ref Range    WBC 16.66 (H) 3.40 - 10.80 10*3/mm3    RBC 3.14 (L) 3.77 - 5.28 10*6/mm3    Hemoglobin 9.4 (L) 12.0 - 15.9 g/dL    Hematocrit 31.5 (L) 34.0 - 46.6 %    .3 (H) 79.0 - 97.0 fL    MCH 29.9 26.6 - 33.0 pg    MCHC 29.8 (L) 31.5 - 35.7 g/dL    RDW 22.0 (H) 12.3 - 15.4 %    RDW-SD 75.2 (H) 37.0 - 54.0 fl    MPV 13.8 (H) 6.0 - 12.0 fL    Platelets 134 (L) 140 - 450 10*3/mm3    Neutrophil % 81.3 (H) 42.7 - 76.0 %    Lymphocyte % 7.9 (L) 19.6 - 45.3 %    Monocyte % 7.6 5.0 - 12.0 %    Eosinophil % 2.0 0.3 - 6.2 %    Basophil % 0.2 0.0 - 1.5 %    Immature Grans % 1.0 (H) 0.0 - 0.5 %    Neutrophils,  Absolute 13.54 (H) 1.70 - 7.00 10*3/mm3    Lymphocytes, Absolute 1.32 0.70 - 3.10 10*3/mm3    Monocytes, Absolute 1.26 (H) 0.10 - 0.90 10*3/mm3    Eosinophils, Absolute 0.33 0.00 - 0.40 10*3/mm3    Basophils, Absolute 0.04 0.00 - 0.20 10*3/mm3    Immature Grans, Absolute 0.17 (H) 0.00 - 0.05 10*3/mm3    nRBC 0.0 0.0 - 0.2 /100 WBC   POC Glucose Once    Collection Time: 07/11/19  6:18 AM   Result Value Ref Range    Glucose 79 70 - 130 mg/dL   POC Glucose Once    Collection Time: 07/11/19 11:06 AM   Result Value Ref Range    Glucose 167 (H) 70 - 130 mg/dL       Radiology:  Imaging Results (last 24 hours)     Procedure Component Value Units Date/Time    XR Chest 1 View [[id number]] Collected:  07/10/19 1832     Updated:  07/10/19 1836    Narrative:       PORTABLE CHEST 07/10/2019 5:30 PM     CLINICAL HISTORY: Dyspnea     Compared to the previous chest x-ray dated 06/07/2019.     The lungs are somewhat poorly inflated. There is abnormal increased  density at the left lung base producing obscuration of the left  hemidiaphragm that is more prominent than on the previous chest x-ray.  Left lower lobe pneumonia and/or atelectasis is suspected. A small left  pleural effusion is also likely present. The right lung is better  expanded and clear. The heart is markedly enlarged and unchanged. A  dual-chamber pacemaker is in place in satisfactory position in the left  subclavian vein without change.     This report was finalized on 7/10/2019 6:33 PM by Dr. Abhishek Abraham M.D.                  ASSESSMENT:   New acute renal failure secondary to decompensated heart failure  Acute on chronic diastolic CHF  Anasarca  Stage III chronic kidney disease, baseline creatinine 1.3-1.5  Obesity  Coronary artery disease  Atrial fibrillation  Anemia, in part due to chronic kidney disease  Obstructive sleep apnea on CPAP  Ischemic cardiomyopathy      PLAN:   Will change to Bumex drip, continue strict I's and O's monitoring  Check renal  ultrasound and additional urine studies, urine electrolytes  Continue scheduled potassium, monitor electrolytes closely on high-dose diuretics  Check iron studies  Discussed with patient and family at bedside    Continue to monitor electrolytes and volume closely, avoid IV contrast and nephrotoxic medications     I appreciate the opportunity to participate in this patient's care.  Please call with any questions or concerns.       Rommel Gerardo M.D  [facility]  Office phone number: [phone]  Answering service phone number: [phone]        7/11/2019        Dictation via Dragon dictation software

## 2019-07-11 NOTE — PROGRESS NOTES
Discharge Planning Assessment  [facility]     Patient Name: Renuka Patel  MRN: [id number]  Today's Date: 7/11/2019    Admit Date: 7/10/2019    Discharge Needs Assessment     Row Name 07/11/19 1503       Living Environment    Lives With  facility resident    Current Living Arrangements  independent/assisted living facility [facility]    Provides Primary Care For  no one    Family Caregiver if Needed  child(swetha), adult    Family Caregiver Names  daughter Charissa Milian and son Remi Patel    Quality of Family Relationships  helpful;involved;supportive    Able to Return to Prior Arrangements  yes       Resource/Environmental Concerns    Resource/Environmental Concerns  none       Transition Planning    Patient/Family Anticipates Transition to  inpatient rehabilitation facility    Patient/Family Anticipated Services at Transition  rehabilitation services;skilled nursing    Transportation Anticipated  family or friend will provide       Discharge Needs Assessment    Concerns to be Addressed  discharge planning    Equipment Currently Used at Home  wheelchair;walker, rolling;grab bar;commode;bipap/cpap    Outpatient/Agency/Support Group Needs  skilled nursing facility    Discharge Facility/Level of Care Needs  nursing facility, skilled    Discharge Coordination/Progress  SNF referrals pending        Discharge Plan     Row Name 07/11/19 1505       Plan    Plan  SNF referrals pending    Patient/Family in Agreement with Plan  yes    Plan Comments  IMM letter checked. CCP met with pt, son and daughter discuss d/c planning (both POAs and will provide paperwork to scan to [facility]). Facesheet verified. CCP role explained. Pt resides at [facility] in assisted living and uses CPAP, grab bars, raised commode, walker and wheelchair for mobility. Pt is current with [facility] home health services (awaiting confirmation) and uses [facility]. Pt/family request sub-acute rehab ref[facility] in  the past, and to [facility] ([facility] in [facility]). CCP to follow for facility evals. Rosa Francisco LCSW        Destination      Service Provider Request Status Selected Services Address Phone Number Fax Number    [facility] [place] Pending - Request Sent N/A [address], [facility] [address] [phone]    [facility] Pending - Request Sent N/A [address] [facility] [address] [phone]      Durable Medical Equipment      No service coordination in this encounter.      Dialysis/Infusion      No service coordination in this encounter.      Home Medical Care      Service Provider Request Status Selected Services Address Phone Number Fax Number    [facility] Pending - Request Sent N/A [address] [address] [phone]      Therapy      No service coordination in this encounter.      Community Resources      No service coordination in this encounter.          Demographic Summary     Row Name 07/11/19 1500       General Information    Admission Type  inpatient    Arrived From  home    Required Notices Provided  Important Message from Medicare    Referral Source  admission list    Reason for Consult  financial concerns    Preferred Language  English        Functional Status     Row Name 07/11/19 1502       Functional Status    Usual Activity Tolerance  moderate    Current Activity Tolerance  fair       Functional Status, IADL    Medications  assistive equipment and person    Meal Preparation  assistive equipment and person    Housekeeping  assistive equipment and person    Laundry  assistive equipment and person    Shopping  assistive equipment and person       Mental Status Summary    Recent Changes in Mental Status/Cognitive Functioning  unable to assess        Psychosocial    No documentation.       Abuse/Neglect    No documentation.       Legal    No documentation.       Substance  Abuse    No documentation.       Patient Forms    No documentation.           Nadia Francisco LCSW

## 2019-07-11 NOTE — NURSING NOTE
CWOCN consult for BLE edema. Patient with pitting edema to BLE, right worse than left. Right leg is bruised, rona. There is a large bruise at the anterolateral shin and the tissue here is very boggy. The skin to both legs is thin. There are no open areas at this time and no moisture on legs. Patient reports that her legs have been weeping prior. Patient will need compression but I would recommend to wait until the edema and redness improve a little so that the skin is not potentially compromised. ACE wraps would very likely tear the skin. Recommend Profore light or a 2 layer foam Coban wrap. We will continue to follow and add compression when appropriate. Discussed with patient and family member at bedside- they verbalized agreement.

## 2019-07-12 NOTE — PROGRESS NOTES
"Daily progress note    Chief complaint  Doing better  No new problems  Family at bedside    History of present illness  79-year-old white female with very complex past medical history including COPD congestive heart failure coronary artery disease chronic atrial fibrillation diabetes mellitus hypertension obstructive sleep apnea breast cancer colon cancer iron deficiency anemia also have chronic kidney disease directly admitted to our service from assisted living with leg swelling generalized weakness and nausea.  Patient evaluated by primary care doctor and found to be in acute kidney injury on top of chronic kidney disease.  Patient also have elevated liver enzymes.  Patient denies any fever chills chest pain abdominal pain vomiting or diarrhea.  Patient complained of right leg redness and denies any trauma injury and fall but work-up revealed cellulitis.    Review of Systems   Unremarkable except for generalized weakness and swelling all over    Exam:   Blood pressure 120/65, pulse 69, temperature 97.9 °F (36.6 °C), temperature source Oral, resp. rate 16, height 162.6 cm (64\"), weight 120 kg (265 lb 9.6 oz), SpO2 99 %.    General Appearance:  Alert, cooperative, no distress, appears stated age   Head:  Normocephalic, without obvious abnormality, atraumatic   Eyes:  PERRL, conjunctiva/corneas clear, EOM's intact, both eyes   Ears:  Normal external ear canals, both ears   Nose: Nares normal, septum midline, mucosa normal, no drainage or sinus tenderness   Throat: Lips, tongue, gums normal; oral mucosa pink and moist   Neck: Supple, symmetrical, trachea midline, no adenopathy;   thyroid: no enlargement/tenderness/nodules; no carotid   bruit or JVD   Back:  Symmetric, no curvature, ROM normal, no CVA tenderness   Lungs:  decreased breath sounds at the bases    Chest Wall:  No tenderness or deformity   Heart:  Regular rate and rhythm, S1 and S2 normal, no murmur, rub or gallop   Abdomen:  Soft, non-tender, obese, " incisional hernia in the upper abdomen, bowel sounds active all four quadrants,   no masses, no hepatomegaly, no splenomegaly, Velasco catheter in place    Extremities: Extremities normal, atraumatic, no cyanosis or edema   Pulses: Pulses palpable in all extremities; symmetric all extremities   Skin: Skin color normal, Skin is warm and dry, no rashes or palpable lesions, probably changes in the lower extremities noted    Neurologic: CNII-XII intact, motor strength grossly intact, sensation grossly intact to light touch, no focal deficits noted      Data Review:   Lab Results (last 24 hours)     Procedure Component Value Units Date/Time    POC Glucose Once [[id number]]  (Abnormal) Collected:  07/12/19 1211    Specimen:  Blood Updated:  07/12/19 1216     Glucose 68 mg/dL     POC Glucose Once [[id number]]  (Abnormal) Collected:  07/12/19 1108    Specimen:  Blood Updated:  07/12/19 1110     Glucose 54 mg/dL     Folate [[id number]]  (Normal) Collected:  07/12/19 0604    Specimen:  Blood Updated:  07/12/19 0737     Folate 12.60 ng/mL     Ferritin [[id number]]  (Abnormal) Collected:  07/12/19 0604    Specimen:  Blood Updated:  07/12/19 0725     Ferritin 1,766.00 ng/mL     Uric Acid [[id number]]  (Abnormal) Collected:  07/12/19 0604    Specimen:  Blood Updated:  07/12/19 0720     Uric Acid 7.7 mg/dL     Iron Profile [[id number]]  (Abnormal) Collected:  07/12/19 0604    Specimen:  Blood Updated:  07/12/19 0720     Iron 33 mcg/dL      Iron Saturation 26 %      Transferrin 85 mg/dL      TIBC 127 mcg/dL     Comprehensive Metabolic Panel [[id number]]  (Abnormal) Collected:  07/12/19 0604    Specimen:  Blood Updated:  07/12/19 0720     Glucose 52 mg/dL      BUN 79 mg/dL      Creatinine 2.12 mg/dL      Sodium 138 mmol/L      Potassium 4.7 mmol/L      Chloride 97 mmol/L      CO2 24.2 mmol/L      Calcium 8.4 mg/dL      Total Protein 5.1 g/dL      Albumin 2.20 g/dL      ALT (SGPT) 26 U/L      AST (SGOT) 122 U/L      Alkaline Phosphatase 1,128  U/L      Total Bilirubin 1.3 mg/dL      eGFR Non African Amer 22 mL/min/1.73      Globulin 2.9 gm/dL      A/G Ratio 0.8 g/dL      BUN/Creatinine Ratio 37.3     Anion Gap 16.8 mmol/L     Narrative:       GFR Normal >60  Chronic Kidney Disease <60  Kidney Failure <15    Phosphorus [[id number]]  (Normal) Collected:  07/12/19 0604    Specimen:  Blood Updated:  07/12/19 0720     Phosphorus 4.3 mg/dL     CBC & Differential [[id number]] Collected:  07/12/19 0604    Specimen:  Blood Updated:  07/12/19 0710    Narrative:       The following orders were created for panel order CBC & Differential.  Procedure                               Abnormality         Status                     ---------                               -----------         ------                     CBC Auto Differential[[id number]]        Abnormal            Final result                 Please view results for these tests on the individual orders.    CBC Auto Differential [[id number]]  (Abnormal) Collected:  07/12/19 0604    Specimen:  Blood Updated:  07/12/19 0710     WBC 16.64 10*3/mm3      RBC 3.32 10*6/mm3      Hemoglobin 9.9 g/dL      Hematocrit 33.1 %      MCV 99.7 fL      MCH 29.8 pg      MCHC 29.9 g/dL      RDW 21.9 %      RDW-SD 74.8 fl      MPV 14.1 fL      Platelets 128 10*3/mm3      Neutrophil % 82.2 %      Lymphocyte % 7.3 %      Monocyte % 7.2 %      Eosinophil % 2.0 %      Basophil % 0.4 %      Immature Grans % 0.9 %      Neutrophils, Absolute 13.68 10*3/mm3      Lymphocytes, Absolute 1.22 10*3/mm3      Monocytes, Absolute 1.20 10*3/mm3      Eosinophils, Absolute 0.33 10*3/mm3      Basophils, Absolute 0.06 10*3/mm3      Immature Grans, Absolute 0.15 10*3/mm3      nRBC 0.1 /100 WBC     POC Glucose Once [[id number]]  (Normal) Collected:  07/12/19 0655    Specimen:  Blood Updated:  07/12/19 0658     Glucose 71 mg/dL     POC Glucose Once [[id number]]  (Abnormal) Collected:  07/12/19 0653    Specimen:  Blood Updated:  07/12/19 0658     Glucose 58 mg/dL      POC Glucose Once [[id number]]  (Abnormal) Collected:  07/12/19 0604    Specimen:  Blood Updated:  07/12/19 0606     Glucose 66 mg/dL     Eosinophil Smear - Urine, Urine, Clean Catch [[id number]]  (Normal) Collected:  07/12/19 0501    Specimen:  Urine, Clean Catch Updated:  07/12/19 0557     Eosinophil Smear 0 % EOS/100 Cells     Chloride, Urine, Random - [[id number]] Collected:  07/12/19 0500    Specimen:  Urine Updated:  07/12/19 0553     Chloride, Urine 40 mmol/L     Narrative:       Reference intervals for random urine have not been established.  Clinical usage is dependent upon physician's interpretation in combination with other laboratory tests.     Sodium, Urine, Random - [[id number]] Collected:  07/12/19 0500    Specimen:  Urine Updated:  07/12/19 0553     Sodium, Urine 27 mmol/L     Narrative:       Reference intervals for random urine have not been established.  Clinical usage is dependent upon physician's interpretation in combination with other laboratory tests.     Creatinine, Urine, Random - [[id number]] Collected:  07/12/19 0500    Specimen:  Urine Updated:  07/12/19 0552     Creatinine, Urine 41.1 mg/dL     Narrative:       Reference intervals for random urine have not been established.  Clinical usage is dependent upon physician's interpretation in combination with other laboratory tests.     Urea Nitrogen, Urine - [[id number]] Collected:  07/12/19 0500    Specimen:  Urine Updated:  07/12/19 0552     Urea Nitrogen, Urine 454 mg/dL     Narrative:       Reference intervals for random urine have not been established.  Clinical usage is dependent upon physician's interpretation in combination with other laboratory tests.     Protein, Urine, Random - [[id number]] Collected:  07/12/19 0500    Specimen:  Urine Updated:  07/12/19 0552     Total Protein, Urine 6.0 mg/dL     Narrative:       Reference intervals for random urine have not been established.  Clinical usage is dependent upon physician's  interpretation in combination with other laboratory tests.     POC Glucose Once [[id number]]  (Normal) Collected:  07/11/19 2303    Specimen:  Blood Updated:  07/11/19 2304     Glucose 73 mg/dL     POC Glucose Once [[id number]]  (Abnormal) Collected:  07/11/19 2127    Specimen:  Blood Updated:  07/11/19 2139     Glucose 67 mg/dL     Blood Culture - Blood, Arm, Left [[id number]] Collected:  07/10/19 1755    Specimen:  Blood from Arm, Left Updated:  07/11/19 1815     Blood Culture No growth at 24 hours    Blood Culture - Blood, Hand, Left [[id number]] Collected:  07/10/19 1755    Specimen:  Blood from Hand, Left Updated:  07/11/19 1815     Blood Culture No growth at 24 hours    CK [[id number]]  (Normal) Collected:  07/11/19 1623    Specimen:  Blood Updated:  07/11/19 1704     Creatine Kinase 32 U/L     POC Glucose Once [[id number]]  (Normal) Collected:  07/11/19 1631    Specimen:  Blood Updated:  07/11/19 1634     Glucose 87 mg/dL         Imaging Results (last 24 hours)     Procedure Component Value Units Date/Time    US Gallbladder [[id number]] Updated:  07/11/19 2107    US Renal Bilateral [[id number]] Updated:  07/11/19 2106          Current Facility-Administered Medications:   •  acetaminophen (TYLENOL) tablet 650 mg, 650 mg, Oral, Q4H PRN, Bhupendra De MD, 650 mg at 07/11/19 2243  •  albuterol (PROVENTIL) nebulizer solution 0.083% 2.5 mg/3mL, 2.5 mg, Nebulization, Q4H - RT, Bhupendra De MD, 2.5 mg at 07/12/19 1113  •  allopurinol (ZYLOPRIM) tablet 100 mg, 100 mg, Oral, Daily, Bhupendra De MD, 100 mg at 07/12/19 0953  •  bumetanide (BUMEX) 10 mg in sodium chloride 0.9 % 100 mL (0.1 mg/mL) infusion, 1 mg/hr, Intravenous, Continuous, Rommel Gerardo MD, Last Rate: 10 mL/hr at 07/12/19 0544, 1 mg/hr at 07/12/19 0544  •  carvedilol (COREG) tablet 12.5 mg, 12.5 mg, Oral, Daily, Bhupendra De MD, 12.5 mg at 07/11/19 2248  •  carvedilol (COREG) tablet 12.5 mg, 12.5 mg, Oral, Daily, Bhupendra De MD, 12.5 mg at 07/11/19  1305  •  carvedilol (COREG) tablet 25 mg, 25 mg, Oral, Daily, Bhupendra De MD, 25 mg at 07/12/19 0953  •  ceFAZolin (ANCEF) IVPB 1 g, 1 g, Intravenous, Q8H, Bhupendra De MD, 1 g at 07/12/19 0544  •  guaiFENesin (MUCINEX) 12 hr tablet 600 mg, 600 mg, Oral, Q12H, Bhupendra De MD, 600 mg at 07/12/19 0953  •  insulin glargine (LANTUS) injection 50 Units, 50 Units, Subcutaneous, Daily, Bhupendra De MD  •  insulin lispro (humaLOG) injection 0-7 Units, 0-7 Units, Subcutaneous, 4x Daily With Meals & Nightly, Bhupendra De MD, 2 Units at 07/11/19 1259  •  linagliptin (TRADJENTA) tablet 5 mg, 5 mg, Oral, Daily, Bhupendra De MD, 5 mg at 07/12/19 0953  •  metOLazone (ZAROXOLYN) tablet 5 mg, 5 mg, Oral, Daily, Rommel Gerardo MD  •  nitroglycerin (NITROSTAT) SL tablet 0.4 mg, 0.4 mg, Sublingual, Q5 Min PRN, Bhupendra De MD  •  pantoprazole (PROTONIX) EC tablet 40 mg, 40 mg, Oral, QAM, Bhupendra De MD, 40 mg at 07/12/19 0618  •  potassium chloride (MICRO-K) CR capsule 10 mEq, 10 mEq, Oral, BID With Meals, Bhupendra De MD, 10 mEq at 07/12/19 0953  •  traZODone (DESYREL) tablet 100 mg, 100 mg, Oral, Nightly PRN, Bhupendra De MD, 100 mg at 07/11/19 2244  •  TRELEGY (Fluticasone-Umeclidin-Vilant)100-62.5-25 MCG/INH IN, 1 each, Inhalation, Daily - RT, Bhupendra De MD, 1 each at 07/11/19 1300    ASSESSMENT  Acute kidney injury  Chronic kidney disease stage III  Acute on chronic systolic congestive heart failure  Right lower extremity cellulitis  Chronic atrial fibrillation on Pradaxa  Diabetes mellitus  Hypertension  Hyperlipidemia  COPD  Depression  Elevated liver enzymes probably secondary to congestion  Gastroesophageal reflux disease    PLAN  CPM  Bumex drip  Nephrology consult appreciated  Continue home medications and adjust doses  Accu-Chek with sliding scale insulin  IV antibiotics  Stress ulcer DVT prophylaxis  Supportive care  Discussed with family  Follow closely further recommendation according to hospital  brit WALKER MD

## 2019-07-12 NOTE — SIGNIFICANT NOTE
07/12/19 0823   Rehab Treatment   Discipline physical therapy assistant   Reason Treatment Not Performed unavailable for treatment  (pt off floor in cardiology; PT will follow up in PM as time allows)   Recommendation   PT - Next Appointment 07/13/19

## 2019-07-12 NOTE — PROGRESS NOTES
LOS: 2 days   Patient Care Team:  Pa Rodriguez MD as PCP - General (Internal Medicine)  Code, Manuel GOMEZ II, MD as Consulting Physician (Hematology and Oncology)    Chief Complaint/ Reason for encounter: Acute renal failure, chronic kidney disease, CHF exacerbation and diuretic management  No chief complaint on file.        Subjective   She feels about the same today, edema not much better, still some shortness of breath, dyspnea on exertion and orthopnea      Medical history reviewed:  History of Present Illness    Subjective    History taken from: Patient and chart    Objective     Vital Signs  Temp:  [97.5 °F (36.4 °C)-98.4 °F (36.9 °C)] 97.9 °F (36.6 °C)  Heart Rate:  [69-70] 69  Resp:  [16-18] 16  BP: (118-130)/(49-65) 120/65       Wt Readings from Last 1 Encounters:   07/12/19 0631 120 kg (265 lb 9.6 oz)   07/11/19 0907 123 kg (271 lb 8 oz)   07/10/19 1704 112 kg (246 lb 6.4 oz)       Objective    Objective:  General Appearance: Obese, comfortable, well-appearing, in no acute distress and not in pain.  Output: Producing urine.    HEENT: Normal HEENT exam.    Lungs:  Normal effort and normal respiratory rate.  Breath sounds clear to auscultation.  No  respiratory distress.  No rales, decreased breath sounds or rhonchi.    Heart: Normal rate.  Regular rhythm.  S1 normal.  No murmur.   Abdomen: Abdomen is soft.  Bowel sounds are normal.   There is no abdominal tenderness.  There is no epigastric area or suprapubic area tenderness.  There is no rebound tenderness.     Extremities: Normal range of motion.  Anasarca, 3+ edema  Pulses: Distal pulses are intact.    Neurological: Patient is alert and oriented to person, place and time.    Pupils:  Pupils are equal, round, and reactive to light.    Skin:  Warm and dry.  No rash or cyanosis.           Results Review:    Intake/Output:     Intake/Output Summary (Last 24 hours) at 7/12/2019 1432  Last data filed at 7/12/2019 1300  Gross per 24 hour   Intake 720 ml    Output 600 ml   Net 120 ml         DATA:  Radiology and Labs:  The following labs independently reviewed by me. Additional labs ordered for tomorrow a.m.  Interval notes, chart personally reviewed by me.   Old records independently reviewed showing chronic kidney disease stage III baseline creatinine 1.3  The following radiologic studies independently viewed by me, findings chest x-ray with congestive changes  New problems include suboptimal response to Bumex drip, worsening renal failure  Discussed with patient herself at bedside      Risk/ complexity of medical care/ medical decision making high, high risk medication, Bumex drip      Labs:   Recent Results (from the past 24 hour(s))   CK    Collection Time: 07/11/19  4:23 PM   Result Value Ref Range    Creatine Kinase 32 20 - 180 U/L   POC Glucose Once    Collection Time: 07/11/19  4:31 PM   Result Value Ref Range    Glucose 87 70 - 130 mg/dL   POC Glucose Once    Collection Time: 07/11/19  9:27 PM   Result Value Ref Range    Glucose 67 (L) 70 - 130 mg/dL   POC Glucose Once    Collection Time: 07/11/19 11:03 PM   Result Value Ref Range    Glucose 73 70 - 130 mg/dL   Chloride, Urine, Random -    Collection Time: 07/12/19  5:00 AM   Result Value Ref Range    Chloride, Urine 40 mmol/L   Creatinine, Urine, Random -    Collection Time: 07/12/19  5:00 AM   Result Value Ref Range    Creatinine, Urine 41.1 mg/dL   Urea Nitrogen, Urine -    Collection Time: 07/12/19  5:00 AM   Result Value Ref Range    Urea Nitrogen, Urine 454 mg/dL   Sodium, Urine, Random -    Collection Time: 07/12/19  5:00 AM   Result Value Ref Range    Sodium, Urine 27 mmol/L   Protein, Urine, Random -    Collection Time: 07/12/19  5:00 AM   Result Value Ref Range    Total Protein, Urine 6.0 mg/dL   Eosinophil Smear - Urine, Urine, Clean Catch    Collection Time: 07/12/19  5:01 AM   Result Value Ref Range    Eosinophil Smear 0 0 - 0 % EOS/100 Cells   Uric Acid    Collection Time: 07/12/19  6:04 AM    Result Value Ref Range    Uric Acid 7.7 (H) 2.4 - 5.7 mg/dL   Ferritin    Collection Time: 07/12/19  6:04 AM   Result Value Ref Range    Ferritin 1,766.00 (H) 13.00 - 150.00 ng/mL   Folate    Collection Time: 07/12/19  6:04 AM   Result Value Ref Range    Folate 12.60 4.78 - 24.20 ng/mL   Iron Profile    Collection Time: 07/12/19  6:04 AM   Result Value Ref Range    Iron 33 (L) 37 - 145 mcg/dL    Iron Saturation 26 20 - 50 %    Transferrin 85 (L) 200 - 360 mg/dL    TIBC 127 (L) 298 - 536 mcg/dL   Comprehensive Metabolic Panel    Collection Time: 07/12/19  6:04 AM   Result Value Ref Range    Glucose 52 (L) 65 - 99 mg/dL    BUN 79 (H) 8 - 23 mg/dL    Creatinine 2.12 (H) 0.57 - 1.00 mg/dL    Sodium 138 136 - 145 mmol/L    Potassium 4.7 3.5 - 5.2 mmol/L    Chloride 97 (L) 98 - 107 mmol/L    CO2 24.2 22.0 - 29.0 mmol/L    Calcium 8.4 (L) 8.6 - 10.5 mg/dL    Total Protein 5.1 (L) 6.0 - 8.5 g/dL    Albumin 2.20 (L) 3.50 - 5.20 g/dL    ALT (SGPT) 26 1 - 33 U/L    AST (SGOT) 122 (H) 1 - 32 U/L    Alkaline Phosphatase 1,128 (H) 39 - 117 U/L    Total Bilirubin 1.3 (H) 0.2 - 1.2 mg/dL    eGFR Non African Amer 22 (L) >60 mL/min/1.73    Globulin 2.9 gm/dL    A/G Ratio 0.8 g/dL    BUN/Creatinine Ratio 37.3 (H) 7.0 - 25.0    Anion Gap 16.8 (H) 5.0 - 15.0 mmol/L   CBC Auto Differential    Collection Time: 07/12/19  6:04 AM   Result Value Ref Range    WBC 16.64 (H) 3.40 - 10.80 10*3/mm3    RBC 3.32 (L) 3.77 - 5.28 10*6/mm3    Hemoglobin 9.9 (L) 12.0 - 15.9 g/dL    Hematocrit 33.1 (L) 34.0 - 46.6 %    MCV 99.7 (H) 79.0 - 97.0 fL    MCH 29.8 26.6 - 33.0 pg    MCHC 29.9 (L) 31.5 - 35.7 g/dL    RDW 21.9 (H) 12.3 - 15.4 %    RDW-SD 74.8 (H) 37.0 - 54.0 fl    MPV 14.1 (H) 6.0 - 12.0 fL    Platelets 128 (L) 140 - 450 10*3/mm3    Neutrophil % 82.2 (H) 42.7 - 76.0 %    Lymphocyte % 7.3 (L) 19.6 - 45.3 %    Monocyte % 7.2 5.0 - 12.0 %    Eosinophil % 2.0 0.3 - 6.2 %    Basophil % 0.4 0.0 - 1.5 %    Immature Grans % 0.9 (H) 0.0 - 0.5 %     Neutrophils, Absolute 13.68 (H) 1.70 - 7.00 10*3/mm3    Lymphocytes, Absolute 1.22 0.70 - 3.10 10*3/mm3    Monocytes, Absolute 1.20 (H) 0.10 - 0.90 10*3/mm3    Eosinophils, Absolute 0.33 0.00 - 0.40 10*3/mm3    Basophils, Absolute 0.06 0.00 - 0.20 10*3/mm3    Immature Grans, Absolute 0.15 (H) 0.00 - 0.05 10*3/mm3    nRBC 0.1 0.0 - 0.2 /100 WBC   Phosphorus    Collection Time: 07/12/19  6:04 AM   Result Value Ref Range    Phosphorus 4.3 2.5 - 4.5 mg/dL   POC Glucose Once    Collection Time: 07/12/19  6:04 AM   Result Value Ref Range    Glucose 66 (L) 70 - 130 mg/dL   POC Glucose Once    Collection Time: 07/12/19  6:53 AM   Result Value Ref Range    Glucose 58 (L) 70 - 130 mg/dL   POC Glucose Once    Collection Time: 07/12/19  6:55 AM   Result Value Ref Range    Glucose 71 70 - 130 mg/dL   Adult Transthoracic Echo Complete W/ Cont if Necessary Per Protocol    Collection Time: 07/12/19  8:58 AM   Result Value Ref Range    BSA 2.2 m^2    IVSd 1.1 cm    LVIDd 5.4 cm    LVIDs 3.6 cm    LVPWd 1.1 cm    IVS/LVPW 1.0     FS 33.3 %    EDV(Teich) 141.3 ml    ESV(Teich) 54.4 ml    EF(Teich) 61.5 %    EDV(cubed) 157.5 ml    ESV(cubed) 46.7 ml    EF(cubed) 70.4 %    LV mass(C)d 234.8 grams    LV mass(C)dI 106.5 grams/m^2    SV(Teich) 86.9 ml    SI(Teich) 39.4 ml/m^2    SV(cubed) 110.8 ml    SI(cubed) 50.3 ml/m^2    Ao root diam 3.5 cm    Ao root area 9.6 cm^2    asc Aorta Diam 3.6 cm    LVOT diam 2.2 cm    LVOT area 3.8 cm^2    LVOT area(traced) 3.8 cm^2    LVLd ap4 7.5 cm    EDV(MOD-sp4) 99.0 ml    LVLs ap4 6.9 cm    ESV(MOD-sp4) 47.0 ml    EF(MOD-sp4) 52.5 %    LVLd ap2 7.6 cm    EDV(MOD-sp2) 87.0 ml    LVLs ap2 7.2 cm    ESV(MOD-sp2) 27.0 ml    EF(MOD-sp2) 69.0 %    SV(MOD-sp4) 52.0 ml    SI(MOD-sp4) 23.6 ml/m^2    SV(MOD-sp2) 60.0 ml    SI(MOD-sp2) 27.2 ml/m^2    Ao root area (BSA corrected) 1.6     LV Oneill Vol (BSA corrected) 44.9 ml/m^2    LV Sys Vol (BSA corrected) 21.3 ml/m^2    MV E max hernan 103.0 cm/sec    MV V2 max  115.0 cm/sec    MV max PG 5.3 mmHg    MV V2 mean 69.7 cm/sec    MV mean PG 2.0 mmHg    MV V2 VTI 24.7 cm    MVA(VTI) 2.4 cm^2    MV P1/2t max german 115.0 cm/sec    MV P1/2t 63.2 msec    MVA(P1/2t) 3.5 cm^2    MV dec slope 533.0 cm/sec^2    MV dec time 164 sec    Ao pk german 133.0 cm/sec    Ao max PG 7.1 mmHg    Ao max PG (full) 4.1 mmHg    Ao V2 mean 87.9 cm/sec    Ao mean PG 4.0 mmHg    Ao mean PG (full) 2.0 mmHg    Ao V2 VTI 22.7 cm    JENNIFER(I,A) 2.7 cm^2    JENNIFER(I,D) 2.7 cm^2    JENNIFER(V,A) 2.5 cm^2    JENNIFER(V,D) 2.5 cm^2    LV V1 max PG 3.0 mmHg    LV V1 mean PG 2.0 mmHg    LV V1 max 86.5 cm/sec    LV V1 mean 59.2 cm/sec    LV V1 VTI 15.9 cm    SV(Ao) 218.4 ml    SI(Ao) 99.1 ml/m^2    SV(LVOT) 60.4 ml    SI(LVOT) 27.4 ml/m^2    PA V2 max 94.1 cm/sec    PA max PG 3.5 mmHg    PA max PG (full) 1.2 mmHg    PA acc time 0.07 sec    RV V1 max PG 2.4 mmHg    RV V1 mean PG 1.0 mmHg    RV V1 max 76.9 cm/sec    RV V1 mean 50.6 cm/sec    RV V1 VTI 13.8 cm    TR max german 239.0 cm/sec    PA pr(Accel) 45.7 mmHg    MVA P1/2T LCG 1.9 cm^2     CV ECHO SABI - BZI_BMI 45.5 kilograms/m^2     CV ECHO SABI - BSA(HAYCOCK) 2.4 m^2     CV ECHO SABI - BZI_METRIC_WEIGHT 120.2 kg     CV ECHO SABI - BZI_METRIC_HEIGHT 162.6 cm    Target HR (85%) 119 bpm    Max. Pred. HR (100%) 140 bpm    TDI S' 10.00 cm/sec    RV Base 3.70 cm    Dimensionless Index 0.7 (DI)    LA Volume Index 21.0 mL/m2    Avg E/e' ratio 17.17     EF(MOD-bp) 62.0 %    Lat Peak E' German 7.0 cm/sec    Med Peak E' German 5.00 cm/sec    RAP systole 3 mmHg    RVSP(TR) 26 mmHg    TAPSE (>1.6) 1.80 cm2   POC Glucose Once    Collection Time: 07/12/19 11:08 AM   Result Value Ref Range    Glucose 54 (L) 70 - 130 mg/dL   POC Glucose Once    Collection Time: 07/12/19 12:11 PM   Result Value Ref Range    Glucose 68 (L) 70 - 130 mg/dL       Radiology:  Imaging Results (last 24 hours)     Procedure Component Value Units Date/Time    US Gallbladder [[id number]] Updated:  07/11/19 2107    US Renal  Bilateral [[id number]] Updated:  07/11/19 2106             Medications have been reviewed:  Current Facility-Administered Medications   Medication Dose Route Frequency Provider Last Rate Last Dose   • acetaminophen (TYLENOL) tablet 650 mg  650 mg Oral Q4H PRN Bhupendra De MD   650 mg at 07/11/19 2243   • albuterol (PROVENTIL) nebulizer solution 0.083% 2.5 mg/3mL  2.5 mg Nebulization Q4H - RT Bhupendra De MD   2.5 mg at 07/12/19 1113   • allopurinol (ZYLOPRIM) tablet 100 mg  100 mg Oral Daily Bhupendra De MD   100 mg at 07/12/19 0953   • bumetanide (BUMEX) 10 mg in sodium chloride 0.9 % 100 mL (0.1 mg/mL) infusion  1 mg/hr Intravenous Continuous Rommel Gerardo MD 10 mL/hr at 07/12/19 0544 1 mg/hr at 07/12/19 0544   • carvedilol (COREG) tablet 12.5 mg  12.5 mg Oral Daily Bhupendra De MD   12.5 mg at 07/11/19 2243   • carvedilol (COREG) tablet 12.5 mg  12.5 mg Oral Daily Bhupendra De MD   12.5 mg at 07/11/19 1305   • carvedilol (COREG) tablet 25 mg  25 mg Oral Daily Bhupendra De MD   25 mg at 07/12/19 0953   • ceFAZolin (ANCEF) IVPB 1 g  1 g Intravenous Q8H Bhupendra De MD   1 g at 07/12/19 0544   • guaiFENesin (MUCINEX) 12 hr tablet 600 mg  600 mg Oral Q12H Bhupendra De MD   600 mg at 07/12/19 0953   • insulin glargine (LANTUS) injection 50 Units  50 Units Subcutaneous Daily Bhupendra De MD       • insulin lispro (humaLOG) injection 0-7 Units  0-7 Units Subcutaneous 4x Daily With Meals & Nightly Bhupendra De MD   2 Units at 07/11/19 1259   • linagliptin (TRADJENTA) tablet 5 mg  5 mg Oral Daily Bhupendra De MD   5 mg at 07/12/19 0953   • metOLazone (ZAROXOLYN) tablet 5 mg  5 mg Oral Daily Rommel Gerardo MD       • nitroglycerin (NITROSTAT) SL tablet 0.4 mg  0.4 mg Sublingual Q5 Min PRN Bhupendra De MD       • pantoprazole (PROTONIX) EC tablet 40 mg  40 mg Oral QAM Bhupendra De MD   40 mg at 07/12/19 0618   • potassium chloride (MICRO-K) CR capsule 10 mEq  10 mEq Oral BID With Meals Kenisha  MD Bhupendra   10 mEq at 07/12/19 0953   • traZODone (DESYREL) tablet 100 mg  100 mg Oral Nightly PRN Bhupendra De MD   100 mg at 07/11/19 2244   • TRELEGY (Fluticasone-Umeclidin-Vilant)100-62.5-25 MCG/INH IN  1 each Inhalation Daily - RT Bhupendra De MD   1 each at 07/11/19 1300       ASSESSMENT:  acute renal failure secondary to decompensated heart failure, slightly worse today, suboptimal response to Bumex drip thus far  Acute on chronic diastolic CHF, fluid overload unchanged  Anasarca  Stage III chronic kidney disease, baseline creatinine 1.3-1.5  Obesity  Coronary artery disease  Atrial fibrillation  Anemia, in part due to chronic kidney disease  Obstructive sleep apnea on CPAP  Ischemic cardiomyopathy      PLAN:  Suboptimal response to Bumex drip overnight  We will add daily metolazone, continue Bumex drip for now  Await renal ultrasound and additional urine studies, urine electrolytes not prerenal appearing as would be expected with cardiorenal syndrome  Continue scheduled potassium, monitor electrolytes closely on high-dose diuretics  Discussed with patient and family at bedside      Continue to monitor electrolytes and volume closely, avoid IV contrast and nephrotoxic medications   Please call with any questions or concerns.       Rommel Gerardo MD   [facility]   Office phone number: [phone]  Answering service phone number: [phone]    07/12/19  2:32 PM      Dictation performed using Dragon dictation software

## 2019-07-12 NOTE — PLAN OF CARE
Problem: Patient Care Overview  Goal: Plan of Care Review   07/12/19 2213   Plan of Care Review   Progress improving   OTHER   Outcome Summary Pt require encouragement to sit up this date and participate in therapy. Pt perform supine to sit with Max A, tolerate sitting at EOB for 4 min and Mod A for sit -supine secondary to increased pain. Pt cont to benefit from skilled OT to increase strength and endurance and ind with ADLs

## 2019-07-12 NOTE — NURSING NOTE
CWOCN follow up for BLE. Patient's legs look about the same as yesterday. I think the profore light with the soft contact layer would work on her skin however patient does not think she could tolerate any compression. I advised that at some point she needs to be in compression as that bruise on the right lateral leg is going to open up into a wound. She verbalized understanding. Patient states that it would be too painful for compression at this time. Will continue to follow up- may need to start wound care without compression if she cannot tolerate it and the wound opens.

## 2019-07-12 NOTE — PROGRESS NOTES
"    Patient Name: Renuka Patel  :1940  79 y.o.      Patient Care Team:  Pa Rodriguez MD as PCP - General (Internal Medicine)  Code, Manuel GOMEZ II, MD as Consulting Physician (Hematology and Oncology)    Chief Complaint: follow up heart failure    Interval History: lost 6 lbs overnight though UOP is not that impressive.  She is feeling better but still feels very bloated in the abdominal area. She is mildly short of breath at rest.        Objective   Vital Signs  Temp:  [97.9 °F (36.6 °C)-98.4 °F (36.9 °C)] 97.9 °F (36.6 °C)  Heart Rate:  [69-74] 74  Resp:  [16-18] 18  BP: (118-126)/(49-65) 120/65    Intake/Output Summary (Last 24 hours) at 2019 1725  Last data filed at 2019 1700  Gross per 24 hour   Intake 720 ml   Output 1500 ml   Net -780 ml     Flowsheet Rows      First Filed Value   Admission Height  162.6 cm (64\") Documented at 2019 0907   Admission Weight  112 kg (246 lb 6.4 oz) Documented at 07/10/2019 1704          Physical Exam:   General Appearance:    Alert, cooperative, in no acute distress   Lungs:     Clear to auscultation.  Normal respiratory effort and rate.      Heart:    Regular rhythm and normal rate, normal S1 and S2, no murmurs, gallops or rubs.     Chest Wall:    No abnormalities observed   Abdomen:     Soft, nontender, positive bowel sounds.     Extremities:   no cyanosis, clubbing or edema.  No marked joint deformities.  Adequate musculoskeletal strength.       Results Review:    Results from last 7 days   Lab Units 19  0604   SODIUM mmol/L 138   POTASSIUM mmol/L 4.7   CHLORIDE mmol/L 97*   CO2 mmol/L 24.2   BUN mg/dL 79*   CREATININE mg/dL 2.12*   GLUCOSE mg/dL 52*   CALCIUM mg/dL 8.4*     Results from last 7 days   Lab Units 19  1623 19  0526 07/10/19  1755   CK TOTAL U/L 32  --   --    TROPONIN T ng/mL  --  0.015 0.015     Results from last 7 days   Lab Units 19  0604   WBC 10*3/mm3 16.64*   HEMOGLOBIN g/dL 9.9*   HEMATOCRIT % 33.1* "   PLATELETS 10*3/mm3 128*         Results from last 7 days   Lab Units 07/10/19  1755   MAGNESIUM mg/dL 2.4     Results from last 7 days   Lab Units 07/11/19  0526   CHOLESTEROL mg/dL 72   TRIGLYCERIDES mg/dL 146   HDL CHOL mg/dL 29*   LDL CHOL mg/dL 14               Medication Review:     albuterol 2.5 mg Nebulization Q4H - RT   allopurinol 100 mg Oral Daily   carvedilol 12.5 mg Oral Daily   carvedilol 12.5 mg Oral Daily   carvedilol 25 mg Oral Daily   [START ON 7/13/2019] ceFAZolin 1 g Intravenous Q12H   guaiFENesin 600 mg Oral Q12H   [START ON 7/13/2019] insulin glargine 40 Units Subcutaneous Daily   insulin lispro 0-7 Units Subcutaneous 4x Daily With Meals & Nightly   metOLazone 5 mg Oral Daily   pantoprazole 40 mg Oral QAM   [START ON 7/13/2019] potassium chloride 10 mEq Oral Daily   Fluticasone-Umeclidin-Vilant 1 each Inhalation Daily - RT          bumetanide 1 mg/hr Last Rate: 1 mg/hr (07/12/19 0544)       Assessment/Plan   1. Acute on chronic diastolic heart failure - LV function actually improved (previous EF 30%). She is on IV bumex drip. Metolazone added to assist with diuresis. Continue strict I/O, daily weights with standing scale only.     2. MARCIN on CKD - appreciate nephrology's assistance.     3.  Atrial Fibrillation: Previously on dabigatran, but currently held with concerns of her renal function.     4.  Anemia-followed by Dr. Hart.     5.  Obstructive Sleep Apnea: on cpap therapy     6.  CAD: history of prior LAD and diagonal stents. Denies angina. Continue medical therapy.      7.  Wound Right Leg: Wound care RN evaluated.     8. Morbid obesity - effecting all aspects of care.     9. SSS s/p [facility] PPM. She is currently paced, underlying AF      REGAN Tang  [facility]  07/12/19  5:25 PM

## 2019-07-12 NOTE — PROGRESS NOTES
Discharge Planning Assessment  [facility]     Patient Name: Renuka Patel  MRN: [id number]  Today's Date: 7/12/2019    Admit Date: 7/10/2019    Discharge Needs Assessment    No documentation.       Discharge Plan     Row Name 07/12/19 1426       Plan    Plan  [facility] accepted     Plan Comments  Spoke with pts dtr at bedside. They would like for the pt to go to [facility] [place], Angélica notified. Called and left message updating [facility] [place]. JChasteenRN/CCP         Destination - Selection Complete      Service Provider Request Status Selected Services Address Phone Number Fax Number    [facility] Selected Skilled Nursing [address] [facility] [address] [phone]    [facility] [address] [address] [phone]      Durable Medical Equipment      No service coordination in this encounter.      Dialysis/Infusion      No service coordination in this encounter.      Home Medical Care      Service Provider Request Status Selected Services Address Phone Number Fax Number    [facility] [address] [address] [phone]      Therapy      No service coordination in this encounter.      Community Resources      No service coordination in this encounter.          Demographic Summary    No documentation.       Functional Status    No documentation.       Psychosocial    No documentation.       Abuse/Neglect    No documentation.       Legal    No documentation.       Substance Abuse    No documentation.       Patient Forms    No documentation.           Miranda Willson RN

## 2019-07-12 NOTE — THERAPY TREATMENT NOTE
Acute Care - Occupational Therapy Treatment Note  [facility]     Patient Name: Renuka Patel  : 1940  MRN: [id number]  Today's Date: 2019  Onset of Illness/Injury or Date of Surgery: 07/10/19  Date of Referral to OT: 07/10/19  Referring Physician: Gabriel     Admit Date: 7/10/2019       ICD-10-CM ICD-9-CM   1. Generalized weakness R53.1 780.79     Patient Active Problem List   Diagnosis   • Abdominal aortic aneurysm (CMS/HCC)   • Chronic coronary artery disease   • Atrial fibrillation (CMS/HCC)   • Cardiomyopathy (CMS/HCC)   • Cancer of transverse colon (CMS/HCC)   • Acute on chronic combined systolic and diastolic CHF (congestive heart failure) (CMS/HCC)   • Type 2 diabetes with stage 3 chronic kidney disease GFR 30-59 (CMS/HCC)   • Shortness of breath   • Hyperlipidemia   • Hypertension   • Hernia of anterior abdominal wall   • Vitamin D deficiency   • Iron deficiency anemia refractory to iron therapy   • Anemia due to stage 3 chronic kidney disease treated with erythropoietin (CMS/HCC)   • HAYDEN (obstructive sleep apnea)   • Morbid obesity with BMI of 40.0-44.9, adult (CMS/HCC)   • COPD (chronic obstructive pulmonary disease) (CMS/HCC)   • Mitral regurgitation, moderate   • Uncontrolled type 2 diabetes mellitus with complication, with long-term current use of insulin (CMS/HCC)   • H/O cardiac pacemaker   • Stage 3 chronic kidney disease (CMS/HCC)   • Malabsorption of iron   • Generalized weakness     Past Medical History:   Diagnosis Date   • Abdominal aortic aneurysm (CMS/HCC)     3.6 cm infrarenal 2014   • Acute bronchitis    • Anemia     Secondary to stage 3 chronic kidney disease and hx of iron deficiency; followed by Dr. Manuel Hart   • Anxiety    • Atrial fibrillation (CMS/HCC) 2010    Previous cardioversion; status post AV node ablation by Dr. Bear Rodrigues 2012   • Back pain    • Breast cancer (CMS/HCC)     Stage I, status post lumpectomy; history of radiation   • CAD (coronary  artery disease)    • Cardiomyopathy (CMS/HCC)    • CHF (congestive heart failure) (CMS/HCC)     Acute on chronic systolic and diastolic; followed by Dr. Rhiannon Rios   • Chronic kidney disease     Followed by Dr. Anna Gerardo    • Colon cancer (CMS/HCC)     Stage I, resected 01/27/2014   • COPD (chronic obstructive pulmonary disease) (CMS/HCC)     Oxygen dependent; followed by Dr. Reza   • Esophageal reflux    • Hernia of anterior abdominal wall 3/11/2016   • Hyperlipidemia    • Hypertension    • Joint pain     IN THE RIGHT KNEE   • Mass of adrenal gland (CMS/HCC)     lesion solitary, CT thereof   • Morbid obesity (CMS/HCC)    • Non-rheumatic mitral regurgitation    • NSTEMI (non-ST elevated myocardial infarction) (CMS/HCC) 11/2013   • Oral thrush    • HAYDEN on CPAP     Followed by Dr. Reza   • Osteoarthritis    • Pneumonia     onset date: 01 Mar 2011, Severe pneumonia w mechanical ventilation, [facility] at [facility]   • Shortness of breath    • Sick sinus syndrome (CMS/HCC)     Severe bradycardia; status post pacemaker implantation January 2011   • Strong family history of breast cancer    • Type 2 diabetes mellitus (CMS/HCC)    • Ventral hernia    • Vitamin D deficiency      Past Surgical History:   Procedure Laterality Date   • AV NODE ABLATION  05/2012    Dr. Bear Rodrigues   • BACK SURGERY     • BREAST BIOPSY     • BREAST LUMPECTOMY     • BREAST SURGERY     • CARDIAC PACEMAKER PLACEMENT     • COLON SURGERY  01/27/2014    resection for colon cancer   • COLONOSCOPY  07/10/2015    normal ileum, liopmatous ileocecal valve, diverticulosis throughout entire colon, colonic ulceration, IH, mixed TA/hyperplastic polyps   • CORONARY ANGIOPLASTY WITH STENT PLACEMENT  11/2013    2.25×15 mm drug-eluting stent to the second diagonal of the LAD, 2.75×18 mm Xience drug-eluting stent to the mid LAD, and 3.5×23 mm Xience drug-eluting stent to the proximal LAD   • ENDOSCOPY  07/10/2015    erythematous mucosa in  stomach, no gross lesions in duodenum, proximal white plaques   • HAND SURGERY     • HYSTERECTOMY     • TIBIA FRACTURE SURGERY      left   • TOE SURGERY      bilat all toenails removed x3 r/t fungus       Therapy Treatment    Rehabilitation Treatment Summary     Row Name 07/12/19 1300             Treatment Time/Intention    Discipline  occupational therapist  -SK      Document Type  therapy note (daily note)  -SK      Subjective Information  complains of;pain;fatigue  -SK      Mode of Treatment  individual therapy;occupational therapy  -SK      Patient/Family Observations  son present, pt supine in bed no signs of distress noted   -SK      Care Plan Review  care plan/treatment goals reviewed;patient/other agree to care plan  -SK      Therapy Frequency (OT Eval)  5 times/wk  -SK      Patient Effort  adequate  -SK      Comment  pt states that she was up earlier today to get weighed and she is weak   -SK      Existing Precautions/Restrictions  fall;oxygen therapy device and L/min  -SK      Treatment Considerations/Comments  pt Max A for bed mobility   -SK      Patient Response to Treatment  Pt require encouragement to sit up this date and participate in therapy.  Pt perform supine to sit with Max A, tolerate sitting at EOB for 4 min and Mod A for sit -supine secondary to increased pain.  Pt cont to benefit from skilled OT to increase strength and endurance and ind with ADLs   -SK      Recorded by [SK] Ruth Ann Mendoza OT 07/12/19 1321      Row Name 07/12/19 1300             Cognitive Assessment/Intervention- PT/OT    Orientation Status (Cognition)  oriented x 3  -SK      Follows Commands (Cognition)  WFL  -SK      Recorded by [SK] Ruth Ann Mendoza OT 07/12/19 1321      Row Name 07/12/19 1300             Bed Mobility Assessment/Treatment    Bed Mobility Assessment/Treatment  supine-sit;sit-supine  -SK      Supine-Sit [place] (Bed Mobility)  maximum assist (25% patient effort)  -SK      Sit-Supine [place] (Bed  Mobility)  moderate assist (50% patient effort)  -SK      Bed Mobility, Safety Issues  decreased use of legs for bridging/pushing;decreased use of arms for pushing/pulling  -SK      Assistive Device (Bed Mobility)  bed rails;draw sheet;head of bed elevated  -SK      Recorded by [SK] Ruth Ann Mendoza OT 07/12/19 1321      Row Name 07/12/19 1300             Motor Skills Assessment/Interventions    Additional Documentation  Balance (Group);Balance Interventions (Group)  -SK      Recorded by [SK] Ruth Ann Mendoza OT 07/12/19 1321      Row Name 07/12/19 1300             Static Sitting Balance    Level of [place] (Unsupported Sitting, Static Balance)  contact guard assist  -SK      Sitting Position (Unsupported Sitting, Static Balance)  long sitting on bed  -SK      Time Able to Maintain Position (Unsupported Sitting, Static Balance)  4 to 5 minutes  -SK      Recorded by [SK] Ruth Ann Mendoza OT 07/12/19 1321      Row Name 07/12/19 1300             Positioning and Restraints    Pre-Treatment Position  in bed  -SK      Post Treatment Position  bed  -SK      In Bed  supine;encouraged to call for assist;call light within reach;with nsg  -SK      Recorded by [SK] Ruth Ann Mendoza OT 07/12/19 1321      Row Name 07/12/19 1300             Pain Scale: Numbers Pre/Post-Treatment    Pain Location - Side  Bilateral  -SK      Pain Location - Orientation  generalized  -SK      Pain Location  abdomen  -SK      Recorded by [SK] Ruth Ann Mendoza OT 07/12/19 1321      Row Name 07/12/19 1300             Pain Scale: FACES Pre/Post-Treatment    Pain: FACES Scale, Pretreatment  4-->hurts little more  -SK      Pain: FACES Scale, Post-Treatment  6-->hurts even more  -SK      Recorded by [SK] Ruth Ann Mendoza OT 07/12/19 1321      Row Name 07/12/19 1300             Plan of Care Review    Plan of Care Reviewed With  patient  -SK      Recorded by [SK] Ruth Ann Mendoza OT 07/12/19 1321      Row Name 07/12/19 1300             Outcome Summary/Treatment  Plan (OT)    Daily Summary of Progress (OT)  progress towards functional goals is fair  -SK      Barriers to Overall Progress (OT)  pain, decreased motivation   -SK      Plan for Continued Treatment (OT)  Pt cont to benefit from skilled OT to increase strength and endurance and ind with ADLs   -SK      Anticipated Discharge Disposition (OT)  skilled nursing facility;assisted living facility ([facility])  -SK      Recorded by [SK] Ruth Ann Mendoza OT 07/12/19 1321        User Key  (r) = Recorded By, (t) = Taken By, (c) = Cosigned By    Initials Name Effective Dates Discipline    SK Ruth Ann Mendoza OT 02/25/19 -  OT             Occupational Therapy Education     Title: PT OT SLP Therapies (In Progress)     Topic: Occupational Therapy (In Progress)     Point: ADL training (Done)     Description: Instruct learner(s) on proper safety adaptation and remediation techniques during self care or transfers.   Instruct in proper use of assistive devices.    Learning Progress Summary           Patient Acceptance, E, VU by SK at 7/11/2019  1:19 PM    Comment:  educate pt and family on OT, importance of therapy, DME, ex   Family Acceptance, E, VU by SK at 7/11/2019  1:19 PM    Comment:  educate pt and family on OT, importance of therapy, DME, ex                   Point: Home exercise program (Done)     Description: Instruct learner(s) on appropriate technique for monitoring, assisting and/or progressing therapeutic exercises/activities.    Learning Progress Summary           Patient Acceptance, E, VU by SK at 7/11/2019  1:19 PM    Comment:  educate pt and family on OT, importance of therapy, DME, ex   Family Acceptance, E, VU by SK at 7/11/2019  1:19 PM    Comment:  educate pt and family on OT, importance of therapy, DME, ex                               User Key     Initials Effective Dates Name Provider Type Discipline    SK 02/25/19 -  Ruth Ann Mendoza, OT Occupational Therapist OT                OT Recommendation and Plan  Outcome  Summary/Treatment Plan (OT)  Daily Summary of Progress (OT): progress towards functional goals is fair  Barriers to Overall Progress (OT): pain, decreased motivation   Plan for Continued Treatment (OT): Pt cont to benefit from skilled OT to increase strength and endurance and ind with ADLs   Anticipated Discharge Disposition (OT): skilled nursing facility, assisted living facility ([facility])  Patient/Family Concerns, Anticipated Discharge Disposition (OT): family states concerns with pt not being motivated to get up and partipate in therapy  Therapy Frequency (OT Eval): 5 times/wk  Daily Summary of Progress (OT): progress towards functional goals is fair  Plan of Care Review  Plan of Care Reviewed With: patient  Plan of Care Reviewed With: patient  Outcome Summary: Pt require encouragement to sit up this date and participate in therapy.  Pt perform supine to sit with Max A, tolerate sitting at EOB for 4 min and Mod A for sit -supine secondary to increased pain.  Pt cont to benefit from skilled OT to increase strength and endurance and ind with ADLs   Outcome Measures     Row Name 07/11/19 1300 07/11/19 0900          How much help from another person do you currently need...    Turning from your back to your side while in flat bed without using bedrails?  --  2  -LH     Moving from lying on back to sitting on the side of a flat bed without bedrails?  --  2  -LH     Moving to and from a bed to a chair (including a wheelchair)?  --  2  -LH     Standing up from a chair using your arms (e.g., wheelchair, bedside chair)?  --  2  -LH     Climbing 3-5 steps with a railing?  --  1  -LH     To walk in hospital room?  --  1  -LH     AM-PAC 6 Clicks Score (PT)  --  10  -LH        How much help from another is currently needed...    Putting on and taking off regular lower body clothing?  1  -SK  --     Bathing (including washing, rinsing, and drying)  2  -SK  --     Toileting (which includes using toilet bed pan or urinal)  1  -SK   --     Putting on and taking off regular upper body clothing  2  -SK  --     Taking care of personal grooming (such as brushing teeth)  3  -SK  --     Eating meals  4  -SK  --     AM-PAC 6 Clicks Score (OT)  13  -SK  --        Functional Assessment    Outcome Measure Options  AM-PAC 6 Clicks Daily Activity (OT)  -SK  AM-PAC 6 Clicks Basic Mobility (PT)  -       User Key  (r) = Recorded By, (t) = Taken By, (c) = Cosigned By    Initials Name Provider Type     Liat Machuca, PT Physical Therapist    SK Ruth Ann Mendoza OT Occupational Therapist           Time Calculation:   Time Calculation- OT     Row Name 07/12/19 1322             Time Calculation- OT    OT Start Time  0948  -SK      OT Stop Time  1002  -SK      OT Time Calculation (min)  14 min  -SK      Total Timed Code Minutes- OT  14 minute(s)  -SK      OT Received On  07/12/19  -SK        User Key  (r) = Recorded By, (t) = Taken By, (c) = Cosigned By    Initials Name Provider Type    SK Ruth Ann Mendoza OT Occupational Therapist        Therapy Charges for Today     Code Description Service Date Service Provider Modifiers Qty    77661780542 HC OT EVAL LOW COMPLEXITY 2 7/11/2019 Ruth Ann Mendoza OT GO 1    15461792906 HC OT SELF CARE/MGMT/TRAIN EA 15 MIN 7/11/2019 Ruth Ann Mendoza OT GO 2    11981589501 HC OT SELF CARE/MGMT/TRAIN EA 15 MIN 7/12/2019 Ruth Ann Mendoza OT GO 1               Ruth Ann Mendoza OT  7/12/2019

## 2019-07-13 NOTE — PROGRESS NOTES
LOS: 3 days   Patient Care Team:  Pa Rodriguez MD as PCP - General (Internal Medicine)  Code, Manuel GOMEZ II, MD as Consulting Physician (Hematology and Oncology)    Chief Complaint/ Reason for encounter: Acute renal failure, chronic kidney disease, CHF exacerbation and diuretic management  No chief complaint on file.        Subjective   She feels about the same today, edema  better, still some shortness of breath, dyspnea on exertion and orthopnea      Medical history reviewed:  History of Present Illness    Subjective    History taken from: Patient and chart    Objective     Vital Signs  Temp:  [97.8 °F (36.6 °C)-98.8 °F (37.1 °C)] 98 °F (36.7 °C)  Heart Rate:  [69-74] 70  Resp:  [16-18] 16  BP: ()/(43-65) 113/61       Wt Readings from Last 1 Encounters:   07/13/19 0511 119 kg (263 lb)   07/12/19 0631 120 kg (265 lb 9.6 oz)   07/11/19 0907 123 kg (271 lb 8 oz)   07/10/19 1704 112 kg (246 lb 6.4 oz)       Objective    Objective:  General Appearance: Obese, comfortable, well-appearing, in no acute distress and not in pain.  Output: Producing urine.    HEENT: Normal HEENT exam.    Lungs:  Normal effort and normal respiratory rate.  Breath sounds clear to auscultation.  No  respiratory distress.  No rales, decreased breath sounds or rhonchi.    Heart: Normal rate.  Regular rhythm.  S1 normal.  No murmur.   Abdomen: Abdomen is soft.  Bowel sounds are normal.   There is no abdominal tenderness.  There is no epigastric area or suprapubic area tenderness.  There is no rebound tenderness.     Extremities: Normal range of motion.  Anasarca, 3+ edema  Pulses: Distal pulses are intact.    Neurological: Patient is alert and oriented to person, place and time.    Pupils:  Pupils are equal, round, and reactive to light.    Skin:  Warm and dry.  No rash or cyanosis.           Results Review:    Intake/Output:     Intake/Output Summary (Last 24 hours) at 7/13/2019 0822  Last data filed at 7/13/2019 0622  Gross per 24 hour    Intake 980 ml   Output 1550 ml   Net -570 ml         DATA:  Radiology and Labs:  The following labs independently reviewed by me. Additional labs ordered for tomorrow a.m.  Interval notes, chart personally reviewed by me.   Old records independently reviewed showing chronic kidney disease stage III baseline creatinine 1.3  The following radiologic studies independently viewed by me, renal/ gallbladder US:  CONCLUSION: Extensive abnormality of the liver highly suspicious for  metastatic lesions and further evaluation with CT scan is recommended as  described above. No gallstones are seen but there is an appearance of  wall thickening which may in part relate to trace ascites.     BILATERAL RENAL ULTRASOUND     HISTORY: Acute renal failure. Colon cancer.     Both kidneys are normal in size, the right measuring 9.8 cm in length  and the left measuring 10 cm. There is no evidence of renal obstruction.  There is a suspicion of a heterogeneous mass in the upper pole of the  left kidney measuring up to 5.1 x 6.6 cm. This is concerning for a renal  tumor and further evaluation with CT scan is recommended. The urinary  bladder is decompressed.     CONCLUSION: Suspicious solid-appearing mass involving upper pole of left  kidney and further evaluation with abdominal and pelvic CT scan is  recommended. Please also note the earlier dictated report describing  suspicious liver lesions.  Discussed with patient herself at bedside      Risk/ complexity of medical care/ medical decision making high, high risk medication, Bumex drip      Labs:   Recent Results (from the past 24 hour(s))   Adult Transthoracic Echo Complete W/ Cont if Necessary Per Protocol    Collection Time: 07/12/19  8:58 AM   Result Value Ref Range    BSA 2.2 m^2    IVSd 1.1 cm    LVIDd 5.4 cm    LVIDs 3.6 cm    LVPWd 1.1 cm    IVS/LVPW 1.0     FS 33.3 %    EDV(Teich) 141.3 ml    ESV(Teich) 54.4 ml    EF(Teich) 61.5 %    EDV(cubed) 157.5 ml    ESV(cubed) 46.7 ml     EF(cubed) 70.4 %    LV mass(C)d 234.8 grams    LV mass(C)dI 106.5 grams/m^2    SV(Teich) 86.9 ml    SI(Teich) 39.4 ml/m^2    SV(cubed) 110.8 ml    SI(cubed) 50.3 ml/m^2    Ao root diam 3.5 cm    Ao root area 9.6 cm^2    asc Aorta Diam 3.6 cm    LVOT diam 2.2 cm    LVOT area 3.8 cm^2    LVOT area(traced) 3.8 cm^2    LVLd ap4 7.5 cm    EDV(MOD-sp4) 99.0 ml    LVLs ap4 6.9 cm    ESV(MOD-sp4) 47.0 ml    EF(MOD-sp4) 52.5 %    LVLd ap2 7.6 cm    EDV(MOD-sp2) 87.0 ml    LVLs ap2 7.2 cm    ESV(MOD-sp2) 27.0 ml    EF(MOD-sp2) 69.0 %    SV(MOD-sp4) 52.0 ml    SI(MOD-sp4) 23.6 ml/m^2    SV(MOD-sp2) 60.0 ml    SI(MOD-sp2) 27.2 ml/m^2    Ao root area (BSA corrected) 1.6     LV [place] Vol (BSA corrected) 44.9 ml/m^2    LV Sys Vol (BSA corrected) 21.3 ml/m^2    MV E max hernan 103.0 cm/sec    MV V2 max 115.0 cm/sec    MV max PG 5.3 mmHg    MV V2 mean 69.7 cm/sec    MV mean PG 2.0 mmHg    MV V2 VTI 24.7 cm    MVA(VTI) 2.4 cm^2    MV P1/2t max hernan 115.0 cm/sec    MV P1/2t 63.2 msec    MVA(P1/2t) 3.5 cm^2    MV dec slope 533.0 cm/sec^2    MV dec time 164 sec    Ao pk hernan 133.0 cm/sec    Ao max PG 7.1 mmHg    Ao max PG (full) 4.1 mmHg    Ao V2 mean 87.9 cm/sec    Ao mean PG 4.0 mmHg    Ao mean PG (full) 2.0 mmHg    Ao V2 VTI 22.7 cm    JENNIFER(I,A) 2.7 cm^2    JENNIFER(I,D) 2.7 cm^2    JENNIFER(V,A) 2.5 cm^2    JENNIFER(V,D) 2.5 cm^2    LV V1 max PG 3.0 mmHg    LV V1 mean PG 2.0 mmHg    LV V1 max 86.5 cm/sec    LV V1 mean 59.2 cm/sec    LV V1 VTI 15.9 cm    SV(Ao) 218.4 ml    SI(Ao) 99.1 ml/m^2    SV(LVOT) 60.4 ml    SI(LVOT) 27.4 ml/m^2    PA V2 max 94.1 cm/sec    PA max PG 3.5 mmHg    PA max PG (full) 1.2 mmHg    PA acc time 0.07 sec    RV V1 max PG 2.4 mmHg    RV V1 mean PG 1.0 mmHg    RV V1 max 76.9 cm/sec    RV V1 mean 50.6 cm/sec    RV V1 VTI 13.8 cm    TR max hernan 239.0 cm/sec    PA pr(Accel) 45.7 mmHg    MVA P1/2T LCG 1.9 cm^2     CV ECHO SABI - BZI_BMI 45.5 kilograms/m^2     CV ECHO SABI - BSA(HAYCOCK) 2.4 m^2     CV ECHO SABI -  BZI_METRIC_WEIGHT 120.2 kg     CV ECHO SABI - BZI_METRIC_HEIGHT 162.6 cm    Target HR (85%) 119 bpm    Max. Pred. HR (100%) 140 bpm    TDI S' 10.00 cm/sec    RV Base 3.70 cm    Dimensionless Index 0.7 (DI)    LA Volume Index 21.0 mL/m2    Avg E/e' ratio 17.17     EF(MOD-bp) 62.0 %    Lat Peak E' German 7.0 cm/sec    Med Peak E' German 5.00 cm/sec    RAP systole 3 mmHg    RVSP(TR) 26 mmHg    TAPSE (>1.6) 1.80 cm2   POC Glucose Once    Collection Time: 07/12/19 11:08 AM   Result Value Ref Range    Glucose 54 (L) 70 - 130 mg/dL   POC Glucose Once    Collection Time: 07/12/19 12:11 PM   Result Value Ref Range    Glucose 68 (L) 70 - 130 mg/dL   POC Glucose Once    Collection Time: 07/12/19  4:50 PM   Result Value Ref Range    Glucose 59 (L) 70 - 130 mg/dL   POC Glucose Once    Collection Time: 07/12/19  5:10 PM   Result Value Ref Range    Glucose 76 70 - 130 mg/dL   POC Glucose Once    Collection Time: 07/12/19  8:23 PM   Result Value Ref Range    Glucose 60 (L) 70 - 130 mg/dL   POC Glucose Once    Collection Time: 07/12/19  9:03 PM   Result Value Ref Range    Glucose 51 (L) 70 - 130 mg/dL   POC Glucose Once    Collection Time: 07/12/19 10:06 PM   Result Value Ref Range    Glucose 84 70 - 130 mg/dL   POC Glucose Once    Collection Time: 07/12/19 11:33 PM   Result Value Ref Range    Glucose 104 70 - 130 mg/dL   POC Glucose Once    Collection Time: 07/13/19 12:31 AM   Result Value Ref Range    Glucose 61 (L) 70 - 130 mg/dL   POC Glucose Once    Collection Time: 07/13/19  1:18 AM   Result Value Ref Range    Glucose 87 70 - 130 mg/dL   POC Glucose Once    Collection Time: 07/13/19  2:36 AM   Result Value Ref Range    Glucose 75 70 - 130 mg/dL   POC Glucose Once    Collection Time: 07/13/19  4:11 AM   Result Value Ref Range    Glucose 80 70 - 130 mg/dL   POC Glucose Once    Collection Time: 07/13/19  5:26 AM   Result Value Ref Range    Glucose 91 70 - 130 mg/dL   POC Glucose Once    Collection Time: 07/13/19  7:48 AM   Result  Value Ref Range    Glucose 83 70 - 130 mg/dL       Radiology:  Imaging Results (last 24 hours)     Procedure Component Value Units Date/Time    US Gallbladder [[id number]] Collected:  07/12/19 1711     Updated:  07/12/19 1724    Narrative:       GALLBLADDER ULTRASOUND     HISTORY: Elevated liver function tests. Previous colon cancer.     The examination was performed as an emergency procedure. The gallbladder  shows no gallstones. There appears to be generalized wall thickening of  the gallbladder which may in part relate to trace ascites. The common  bile duct is normal in caliber.     There are numerous liver lesions measuring up to 4.7 cm and highly  suspicious for extensive metastatic liver disease and further evaluation  with abdominal and pelvic CT scan is therefore recommended. The  visualized pancreas and right kidney are unremarkable.     CONCLUSION: Extensive abnormality of the liver highly suspicious for  metastatic lesions and further evaluation with CT scan is recommended as  described above. No gallstones are seen but there is an appearance of  wall thickening which may in part relate to trace ascites.     BILATERAL RENAL ULTRASOUND     HISTORY: Acute renal failure. Colon cancer.     Both kidneys are normal in size, the right measuring 9.8 cm in length  and the left measuring 10 cm. There is no evidence of renal obstruction.  There is a suspicion of a heterogeneous mass in the upper pole of the  left kidney measuring up to 5.1 x 6.6 cm. This is concerning for a renal  tumor and further evaluation with CT scan is recommended. The urinary  bladder is decompressed.     CONCLUSION: Suspicious solid-appearing mass involving upper pole of left  kidney and further evaluation with abdominal and pelvic CT scan is  recommended. Please also note the earlier dictated report describing  suspicious liver lesions.     This report was finalized on 7/12/2019 5:21 PM by Dr. Alfonso Nelson M.D.       US Renal Bilateral  [[id number]] Collected:  07/12/19 1711     Updated:  07/12/19 1724    Narrative:       GALLBLADDER ULTRASOUND     HISTORY: Elevated liver function tests. Previous colon cancer.     The examination was performed as an emergency procedure. The gallbladder  shows no gallstones. There appears to be generalized wall thickening of  the gallbladder which may in part relate to trace ascites. The common  bile duct is normal in caliber.     There are numerous liver lesions measuring up to 4.7 cm and highly  suspicious for extensive metastatic liver disease and further evaluation  with abdominal and pelvic CT scan is therefore recommended. The  visualized pancreas and right kidney are unremarkable.     CONCLUSION: Extensive abnormality of the liver highly suspicious for  metastatic lesions and further evaluation with CT scan is recommended as  described above. No gallstones are seen but there is an appearance of  wall thickening which may in part relate to trace ascites.     BILATERAL RENAL ULTRASOUND     HISTORY: Acute renal failure. Colon cancer.     Both kidneys are normal in size, the right measuring 9.8 cm in length  and the left measuring 10 cm. There is no evidence of renal obstruction.  There is a suspicion of a heterogeneous mass in the upper pole of the  left kidney measuring up to 5.1 x 6.6 cm. This is concerning for a renal  tumor and further evaluation with CT scan is recommended. The urinary  bladder is decompressed.     CONCLUSION: Suspicious solid-appearing mass involving upper pole of left  kidney and further evaluation with abdominal and pelvic CT scan is  recommended. Please also note the earlier dictated report describing  suspicious liver lesions.     This report was finalized on 7/12/2019 5:21 PM by Dr. Alfonso Nelson M.D.                Medications have been reviewed:  Current Facility-Administered Medications   Medication Dose Route Frequency Provider Last Rate Last Dose   • acetaminophen (TYLENOL)  tablet 650 mg  650 mg Oral Q4H PRN Bhupendra De MD   650 mg at 07/11/19 2243   • albuterol (PROVENTIL) nebulizer solution 0.083% 2.5 mg/3mL  2.5 mg Nebulization Q4H - RT Bhupendra De MD   2.5 mg at 07/12/19 1907   • allopurinol (ZYLOPRIM) tablet 100 mg  100 mg Oral Daily Bhupendra De MD   100 mg at 07/13/19 0803   • bumetanide (BUMEX) 10 mg in sodium chloride 0.9 % 100 mL (0.1 mg/mL) infusion  1 mg/hr Intravenous Continuous Rommel Gerardo MD 10 mL/hr at 07/12/19 2117 1 mg/hr at 07/12/19 2117   • carvedilol (COREG) tablet 12.5 mg  12.5 mg Oral Daily Bhupendra De MD   12.5 mg at 07/11/19 2243   • carvedilol (COREG) tablet 12.5 mg  12.5 mg Oral Daily Bhupendra De MD   12.5 mg at 07/12/19 1459   • carvedilol (COREG) tablet 25 mg  25 mg Oral Daily Bhupendra De MD   25 mg at 07/13/19 0803   • ceFAZolin (ANCEF) IVPB 1 g  1 g Intravenous Q12H Bhupendra De MD   1 g at 07/13/19 0439   • dextrose (D50W) 25 g/ 50mL Intravenous Solution 25 g  25 g Intravenous Q15 Min PRN Bhupendra De MD   25 g at 07/13/19 0037   • dextrose (GLUTOSE) oral gel 15 g  15 g Oral Q15 Min PRN Bhupendra De MD       • glucagon (human recombinant) (GLUCAGEN DIAGNOSTIC) injection 1 mg  1 mg Subcutaneous Q15 Min PRN Bhupendra De MD       • guaiFENesin (MUCINEX) 12 hr tablet 600 mg  600 mg Oral Q12H Bhupendra De MD   600 mg at 07/13/19 0803   • insulin glargine (LANTUS) injection 40 Units  40 Units Subcutaneous Daily Bhupendra De MD       • insulin lispro (humaLOG) injection 0-7 Units  0-7 Units Subcutaneous 4x Daily With Meals & Nightly Bhupendra De MD   2 Units at 07/11/19 1259   • metOLazone (ZAROXOLYN) tablet 5 mg  5 mg Oral Daily Rommel Gerardo MD   5 mg at 07/13/19 0803   • nitroglycerin (NITROSTAT) SL tablet 0.4 mg  0.4 mg Sublingual Q5 Min PRN Bhupendra De MD       • ondansetron (ZOFRAN) injection 4 mg  4 mg Intravenous Q6H PRN Bhupendra De MD   4 mg at 07/13/19 0815   • pantoprazole (PROTONIX) EC tablet 40 mg  40 mg Oral  QAM Bhupendra De MD   40 mg at 07/13/19 0640   • potassium chloride (MICRO-K) CR capsule 10 mEq  10 mEq Oral Daily Bhupendra De MD       • traZODone (DESYREL) tablet 100 mg  100 mg Oral Nightly PRN Bhupendra De MD   100 mg at 07/11/19 9704   • TRELEGY (Fluticasone-Umeclidin-Vilant)100-62.5-25 MCG/INH IN  1 each Inhalation Daily - RT Bhupendra De MD   1 each at 07/12/19 1009       ASSESSMENT:  acute renal failure secondary to decompensated heart failure, suboptimal response to Bumex drip thus far, added metolazone  Acute on chronic diastolic CHF, fluid overload unchanged, weight slowly better  Anasarca, improved  Stage III chronic kidney disease, baseline creatinine 1.3-1.5  Obesity  Multiple liver lesions, likely metastatic disease, needs CT  Renal mass vs adrenal mass, await CT  Renal cystic disease  Coronary artery disease  Atrial fibrillation  Anemia, in part due to chronic kidney disease  Obstructive sleep apnea on CPAP  Ischemic cardiomyopathy      PLAN:  UOP better overnight  Will continue daily metolazone + Bumex drip for now  renal ultrasound noted, negative additional urine studies, urine electrolytes not prerenal appearing as would be expected with cardiorenal syndrome  Continue scheduled potassium, monitor electrolytes closely on high-dose diuretics  Await CT to better characterize renal and liver lesions, ? metastatic disease      Continue to monitor electrolytes and volume closely, avoid IV contrast and nephrotoxic medications   Please call with any questions or concerns.       Rommel Gerardo MD   [facility]   Office phone number: [phone]  Answering service phone number: [phone]    07/13/19  8:22 AM      Dictation performed using Dragon dictation software

## 2019-07-13 NOTE — PROGRESS NOTES
"Daily progress note    Chief complaint  Doing same  No new problems  Family at bedside    History of present illness  79-year-old white female with very complex past medical history including COPD congestive heart failure coronary artery disease chronic atrial fibrillation diabetes mellitus hypertension obstructive sleep apnea breast cancer colon cancer iron deficiency anemia also have chronic kidney disease directly admitted to our service from assisted living with leg swelling generalized weakness and nausea.  Patient evaluated by primary care doctor and found to be in acute kidney injury on top of chronic kidney disease.  Patient also have elevated liver enzymes.  Patient denies any fever chills chest pain abdominal pain vomiting or diarrhea.  Patient complained of right leg redness and denies any trauma injury and fall but work-up revealed cellulitis.    Review of Systems   Unremarkable except for generalized weakness and swelling all over    Exam:   Blood pressure 113/61, pulse 70, temperature 98 °F (36.7 °C), temperature source Oral, resp. rate 16, height 162.6 cm (64\"), weight 119 kg (263 lb), SpO2 99 %.    General Appearance:  Alert, cooperative, no distress, appears stated age   Head:  Normocephalic, without obvious abnormality, atraumatic   Eyes:  PERRL, conjunctiva/corneas clear, EOM's intact, both eyes   Ears:  Normal external ear canals, both ears   Nose: Nares normal, septum midline, mucosa normal, no drainage or sinus tenderness   Throat: Lips, tongue, gums normal; oral mucosa pink and moist   Neck: Supple, symmetrical, trachea midline, no adenopathy;   thyroid: no enlargement/tenderness/nodules; no carotid   bruit or JVD   Back:  Symmetric, no curvature, ROM normal, no CVA tenderness   Lungs:  decreased breath sounds at the bases    Chest Wall:  No tenderness or deformity   Heart:  Regular rate and rhythm, S1 and S2 normal, no murmur, rub or gallop   Abdomen:  Soft, non-tender, obese, incisional " hernia in the upper abdomen, bowel sounds active all four quadrants,   no masses, no hepatomegaly, no splenomegaly, Velasco catheter in place    Extremities: Extremities normal, atraumatic, no cyanosis or edema   Pulses: Pulses palpable in all extremities; symmetric all extremities   Skin: Skin color normal, Skin is warm and dry, no rashes or palpable lesions, probably changes in the lower extremities noted    Neurologic: CNII-XII intact, motor strength grossly intact, sensation grossly intact to light touch, no focal deficits noted      Data Review:   Lab Results (last 24 hours)     Procedure Component Value Units Date/Time    POC Glucose Once [[id number]]  (Normal) Collected:  07/13/19 1411    Specimen:  Blood Updated:  07/13/19 1412     Glucose 71 mg/dL     POC Glucose Once [[id number]]  (Normal) Collected:  07/13/19 1222    Specimen:  Blood Updated:  07/13/19 1223     Glucose 81 mg/dL     POC Glucose Once [[id number]]  (Normal) Collected:  07/13/19 1114    Specimen:  Blood Updated:  07/13/19 1116     Glucose 82 mg/dL     POC Glucose Once [[id number]]  (Normal) Collected:  07/13/19 1038    Specimen:  Blood Updated:  07/13/19 1039     Glucose 72 mg/dL     Renal Function Panel [[id number]]  (Abnormal) Collected:  07/13/19 0815    Specimen:  Blood from Hand, Left Updated:  07/13/19 0909     Glucose 76 mg/dL      BUN 87 mg/dL      Creatinine 2.31 mg/dL      Sodium 141 mmol/L      Potassium 6.0 mmol/L      Chloride 99 mmol/L      CO2 19.7 mmol/L      Calcium 8.4 mg/dL      Albumin 2.30 g/dL      Phosphorus 6.0 mg/dL      Anion Gap 22.3 mmol/L      BUN/Creatinine Ratio 37.7     eGFR Non African Amer 20 mL/min/1.73     Narrative:       GFR Normal >60  Chronic Kidney Disease <60  Kidney Failure <15    Manual Differential [[id number]]  (Abnormal) Collected:  07/13/19 0533    Specimen:  Blood Updated:  07/13/19 0844     Neutrophil % 92.9 %      Lymphocyte % 2.0 %      Monocyte % 4.1 %      Myelocyte % 1.0 %      Neutrophils  Absolute 12.07 10*3/mm3      Lymphocytes Absolute 0.26 10*3/mm3      Monocytes Absolute 0.53 10*3/mm3      nRBC 1.0 /100 WBC      Anisocytosis Mod/2+     Macrocytes Mod/2+     Spherocytes Slight/1+     WBC Morphology Normal     Platelet Morphology Normal    CBC & Differential [[id number]] Collected:  07/13/19 0533    Specimen:  Blood Updated:  07/13/19 0844    Narrative:       The following orders were created for panel order CBC & Differential.  Procedure                               Abnormality         Status                     ---------                               -----------         ------                     CBC Auto Differential[[id number]]        Abnormal            Final result                 Please view results for these tests on the individual orders.    CBC Auto Differential [[id number]]  (Abnormal) Collected:  07/13/19 0533    Specimen:  Blood Updated:  07/13/19 0844     WBC 12.99 10*3/mm3      RBC 3.40 10*6/mm3      Hemoglobin 10.3 g/dL      Hematocrit 35.0 %      .9 fL      MCH 30.3 pg      MCHC 29.4 g/dL      RDW 22.7 %      RDW-SD 83.1 fl      MPV 14.6 fL      Platelets 135 10*3/mm3     POC Glucose Once [[id number]]  (Normal) Collected:  07/13/19 0748    Specimen:  Blood Updated:  07/13/19 0750     Glucose 83 mg/dL     POC Glucose Once [[id number]]  (Normal) Collected:  07/13/19 0526    Specimen:  Blood Updated:  07/13/19 0528     Glucose 91 mg/dL     POC Glucose Once [[id number]]  (Normal) Collected:  07/13/19 0411    Specimen:  Blood Updated:  07/13/19 0413     Glucose 80 mg/dL     POC Glucose Once [[id number]]  (Normal) Collected:  07/13/19 0236    Specimen:  Blood Updated:  07/13/19 0237     Glucose 75 mg/dL     POC Glucose Once [[id number]]  (Normal) Collected:  07/13/19 0118    Specimen:  Blood Updated:  07/13/19 0119     Glucose 87 mg/dL     POC Glucose Once [[id number]]  (Abnormal) Collected:  07/13/19 0031    Specimen:  Blood Updated:  07/13/19 0032     Glucose 61 mg/dL     POC Glucose  Once [[id number]]  (Normal) Collected:  19 2333    Specimen:  Blood Updated:  19 0030     Glucose 104 mg/dL     POC Glucose Once [[id number]]  (Normal) Collected:  19 2206    Specimen:  Blood Updated:  19 2225     Glucose 84 mg/dL     POC Glucose Once [[id number]]  (Abnormal) Collected:  19    Specimen:  Blood Updated:  19     Glucose 51 mg/dL     POC Glucose Once [[id number]]  (Abnormal) Collected:  19    Specimen:  Blood Updated:  19     Glucose 60 mg/dL     Blood Culture - Blood, Arm, Left [[id number]] Collected:  07/10/19 1755    Specimen:  Blood from Arm, Left Updated:  19     Blood Culture No growth at 2 days    Blood Culture - Blood, Hand, Left [[id number]] Collected:  07/10/19 1755    Specimen:  Blood from Hand, Left Updated:  19     Blood Culture No growth at 2 days    POC Glucose Once [[id number]]  (Normal) Collected:  19 171    Specimen:  Blood Updated:  19 171     Glucose 76 mg/dL     POC Glucose Once [[id number]]  (Abnormal) Collected:  19 165    Specimen:  Blood Updated:  19 1659     Glucose 59 mg/dL         Imaging Results (last 24 hours)     Procedure Component Value Units Date/Time    CT Abdomen Pelvis Without Contrast [[id number]] Collected:  19 1221     Updated:  19 122    Narrative:       CT SCAN ABDOMEN AND PELVIS WITHOUT CONTRAST     HISTORY: Previous colon cancer with recent ultrasound exam suspicious  for liver metastases. Also possible left renal mass.     TECHNIQUE/FINDINGS: The CT scan was performed through the abdomen and  pelvis without contrast and demonstrates the followin. The exam is limited by the patient's marked obesity and lack of  intravenous contrast. The liver is enlarged with a heterogeneous texture  that particularly involves large portions of the right lobe. Given the  appearance on ultrasound, I suspect this relates to extensive  involvement of the  liver with metastatic disease and CT-guided biopsy  may be helpful versus further evaluation with contrast enhancement or  MRI.  2. There are small bilateral pleural effusions with some adjacent dense  atelectasis at the lung bases. I cannot completely exclude some  developing pneumonia at the left base. There is a small pericardial  effusion measuring 7 mm in thickness.  3. The spleen is normal in size and contains several calcified  granulomas. There is a small cystic lesion at the anterior margin of the  spleen measuring 1.7 cm and I suspect this represents a splenic cyst  showing very slight enlargement since a CT scan dating back to  01/20/2014.  4. The gallbladder is quite contracted and may contain some dense  sludge. There is a small amount of ascites in the abdomen that extends  into the pelvis which is nonspecific but may relate to extensive  metastatic liver disease.  5. The recent ultrasound exam raised the concern of a solid mass  involving the upper pole of the left kidney. This actually represents a  large left adrenal mass containing areas of calcification and measuring  up to 6 cm. This shows further calcification and slight enlargement  since the exam dating back to 2014 and likely relates to a large adrenal  adenoma. The right adrenal gland appears normal.  6. There is mild generalized cortical thinning involving both kidneys  and there is a small exophytic cyst involving the left kidney as also  noted in 2014. There is no renal obstruction.  7. There is an infrarenal abdominal aortic aneurysm measuring 4.6 cm  compared to 3.6 cm in 2014. There is no retroperitoneal lymphadenopathy.  8. The large and small bowel loops are normal in caliber and show no  inflammatory change. The remainder of the pelvis is unremarkable.  9. At bone windows, there is some minimal compression deformity  involving the superior endplate of T12. There is prominent degenerative  change at L4-5 with a vacuum phenomenon and  some minimal anterior  subluxation of L4 combined with severe facet spurring. No suspicious  lytic bone lesions are seen.                 Radiation dose reduction techniques were utilized, including automated  exposure control and exposure modulation based on body size.     This report was finalized on 7/13/2019 12:22 PM by Dr. Alfonso Nelson M.D.        Gallbladder [[id number]] Collected:  07/12/19 1711     Updated:  07/12/19 1724    Narrative:       GALLBLADDER ULTRASOUND     HISTORY: Elevated liver function tests. Previous colon cancer.     The examination was performed as an emergency procedure. The gallbladder  shows no gallstones. There appears to be generalized wall thickening of  the gallbladder which may in part relate to trace ascites. The common  bile duct is normal in caliber.     There are numerous liver lesions measuring up to 4.7 cm and highly  suspicious for extensive metastatic liver disease and further evaluation  with abdominal and pelvic CT scan is therefore recommended. The  visualized pancreas and right kidney are unremarkable.     CONCLUSION: Extensive abnormality of the liver highly suspicious for  metastatic lesions and further evaluation with CT scan is recommended as  described above. No gallstones are seen but there is an appearance of  wall thickening which may in part relate to trace ascites.     BILATERAL RENAL ULTRASOUND     HISTORY: Acute renal failure. Colon cancer.     Both kidneys are normal in size, the right measuring 9.8 cm in length  and the left measuring 10 cm. There is no evidence of renal obstruction.  There is a suspicion of a heterogeneous mass in the upper pole of the  left kidney measuring up to 5.1 x 6.6 cm. This is concerning for a renal  tumor and further evaluation with CT scan is recommended. The urinary  bladder is decompressed.     CONCLUSION: Suspicious solid-appearing mass involving upper pole of left  kidney and further evaluation with abdominal and pelvic  CT scan is  recommended. Please also note the earlier dictated report describing  suspicious liver lesions.     This report was finalized on 7/12/2019 5:21 PM by Dr. Alfonso Nelson M.D.        Renal Bilateral [[id number]] Collected:  07/12/19 1711     Updated:  07/12/19 1724    Narrative:       GALLBLADDER ULTRASOUND     HISTORY: Elevated liver function tests. Previous colon cancer.     The examination was performed as an emergency procedure. The gallbladder  shows no gallstones. There appears to be generalized wall thickening of  the gallbladder which may in part relate to trace ascites. The common  bile duct is normal in caliber.     There are numerous liver lesions measuring up to 4.7 cm and highly  suspicious for extensive metastatic liver disease and further evaluation  with abdominal and pelvic CT scan is therefore recommended. The  visualized pancreas and right kidney are unremarkable.     CONCLUSION: Extensive abnormality of the liver highly suspicious for  metastatic lesions and further evaluation with CT scan is recommended as  described above. No gallstones are seen but there is an appearance of  wall thickening which may in part relate to trace ascites.     BILATERAL RENAL ULTRASOUND     HISTORY: Acute renal failure. Colon cancer.     Both kidneys are normal in size, the right measuring 9.8 cm in length  and the left measuring 10 cm. There is no evidence of renal obstruction.  There is a suspicion of a heterogeneous mass in the upper pole of the  left kidney measuring up to 5.1 x 6.6 cm. This is concerning for a renal  tumor and further evaluation with CT scan is recommended. The urinary  bladder is decompressed.     CONCLUSION: Suspicious solid-appearing mass involving upper pole of left  kidney and further evaluation with abdominal and pelvic CT scan is  recommended. Please also note the earlier dictated report describing  suspicious liver lesions.     This report was finalized on 7/12/2019 5:21 PM  by Dr. Alfonso Nelson M.D.             Current Facility-Administered Medications:   •  acetaminophen (TYLENOL) tablet 650 mg, 650 mg, Oral, Q4H PRN, Bhupendra De MD, 650 mg at 07/11/19 2243  •  albuterol (PROVENTIL) nebulizer solution 0.083% 2.5 mg/3mL, 2.5 mg, Nebulization, Q4H - RT, Bhupendra De MD, 2.5 mg at 07/12/19 1907  •  allopurinol (ZYLOPRIM) tablet 100 mg, 100 mg, Oral, Daily, Bhupendra De MD, 100 mg at 07/13/19 0803  •  bumetanide (BUMEX) 10 mg in sodium chloride 0.9 % 100 mL (0.1 mg/mL) infusion, 1 mg/hr, Intravenous, Continuous, Rommel Gerardo MD, Last Rate: 10 mL/hr at 07/13/19 1141, 1 mg/hr at 07/13/19 1141  •  carvedilol (COREG) tablet 12.5 mg, 12.5 mg, Oral, Daily, Bhupendra De MD, 12.5 mg at 07/11/19 2243  •  carvedilol (COREG) tablet 12.5 mg, 12.5 mg, Oral, Daily, Bhupendra De MD, 12.5 mg at 07/13/19 1441  •  carvedilol (COREG) tablet 25 mg, 25 mg, Oral, Daily, Bhupendra De MD, 25 mg at 07/13/19 0803  •  ceFAZolin (ANCEF) IVPB 1 g, 1 g, Intravenous, Q12H, Bhupendra De MD, 1 g at 07/13/19 0439  •  dextrose (D50W) 25 g/ 50mL Intravenous Solution 25 g, 25 g, Intravenous, Q15 Min PRN, Bhupendra De MD, 25 g at 07/13/19 0037  •  dextrose (GLUTOSE) oral gel 15 g, 15 g, Oral, Q15 Min PRN, Bhupendra De MD  •  glucagon (human recombinant) (GLUCAGEN DIAGNOSTIC) injection 1 mg, 1 mg, Subcutaneous, Q15 Min PRN, Bhupendra De MD  •  guaiFENesin (MUCINEX) 12 hr tablet 600 mg, 600 mg, Oral, Q12H, Bhupendra De MD, 600 mg at 07/13/19 0803  •  insulin glargine (LANTUS) injection 40 Units, 40 Units, Subcutaneous, Daily, Bhupendra De MD  •  insulin lispro (humaLOG) injection 0-7 Units, 0-7 Units, Subcutaneous, 4x Daily With Meals & Nightly, Bhupendra De MD, 2 Units at 07/11/19 1259  •  metOLazone (ZAROXOLYN) tablet 5 mg, 5 mg, Oral, Daily, Rommel Gerardo MD, 5 mg at 07/13/19 0803  •  nitroglycerin (NITROSTAT) SL tablet 0.4 mg, 0.4 mg, Sublingual, Q5 Min PRN, Bhupendra De MD  •  ondansetron  (ZOFRAN) injection 4 mg, 4 mg, Intravenous, Q6H PRN, Ge De MD, 4 mg at 07/13/19 1438  •  pantoprazole (PROTONIX) EC tablet 40 mg, 40 mg, Oral, QAM, Ge De MD, 40 mg at 07/13/19 0640  •  traZODone (DESYREL) tablet 100 mg, 100 mg, Oral, Nightly PRN, Ge De MD, 100 mg at 07/11/19 2244  •  TRELEGY (Fluticasone-Umeclidin-Vilant)100-62.5-25 MCG/INH IN, 1 each, Inhalation, Daily - RT, Ge De MD, 1 each at 07/12/19 1009    ASSESSMENT  Acute kidney injury  Chronic kidney disease stage III  Acute on chronic systolic congestive heart failure  Right lower extremity cellulitis  Chronic atrial fibrillation on Pradaxa  Diabetes mellitus  Hypertension  Hyperlipidemia  COPD  Depression  Elevated liver enzymes probably secondary to congestion  Gastroesophageal reflux disease    PLAN  CPM  Bumex drip and metolazone  Nephrology consult appreciated  Continue home medications and adjust doses  Accu-Chek with sliding scale insulin  IV antibiotics  Stress ulcer DVT prophylaxis  Supportive care  Discussed with family  Follow closely further recommendation according to hospital course    GE DE MD

## 2019-07-13 NOTE — PLAN OF CARE
Problem: Patient Care Overview  Goal: Plan of Care Review  Outcome: Ongoing (interventions implemented as appropriate)   07/13/19 4709   Coping/Psychosocial   Plan of Care Reviewed With patient   OTHER   Outcome Summary c/o nausea off and on today; poor appetite; closely monitoring blood sugars; no signs of hypoglycemia; c/o abdominal distention; Kaexalate given; awaiting results; long acting insulin dc'd;VSS; cont to monitor     Goal: Individualization and Mutuality  Outcome: Ongoing (interventions implemented as appropriate)    Goal: Discharge Needs Assessment  Outcome: Ongoing (interventions implemented as appropriate)    Goal: Interprofessional Rounds/Family Conf  Outcome: Ongoing (interventions implemented as appropriate)      Problem: Fall Risk (Adult)  Goal: Absence of Fall  Outcome: Ongoing (interventions implemented as appropriate)      Problem: Skin Injury Risk (Adult)  Goal: Skin Health and Integrity  Outcome: Ongoing (interventions implemented as appropriate)      Problem: Cardiac: Heart Failure (Adult)  Goal: Signs and Symptoms of Listed Potential Problems Will be Absent, Minimized or Managed (Cardiac: Heart Failure)  Outcome: Ongoing (interventions implemented as appropriate)

## 2019-07-13 NOTE — PLAN OF CARE
Problem: Patient Care Overview  Goal: Plan of Care Review  Outcome: Ongoing (interventions implemented as appropriate)   07/13/19 5866   Coping/Psychosocial   Plan of Care Reviewed With patient   Plan of Care Review   Progress improving   OTHER   Outcome Summary Inc of urine, Purewick in place. O2 4L NC. Did not wear CPAP tonight, has been nauseated. Also not able to enough PO glucose adequately and sugars have been dropping. All insulin held yesterday. Note left for Dr De, pt requesting to see her Endocrinologist. Frequent accuchecks.       Problem: Fall Risk (Adult)  Goal: Identify Related Risk Factors and Signs and Symptoms  Outcome: Outcome(s) achieved Date Met: 07/13/19    Goal: Absence of Fall  Outcome: Ongoing (interventions implemented as appropriate)      Problem: Skin Injury Risk (Adult)  Goal: Identify Related Risk Factors and Signs and Symptoms  Outcome: Outcome(s) achieved Date Met: 07/13/19    Goal: Skin Health and Integrity  Outcome: Ongoing (interventions implemented as appropriate)      Problem: Cardiac: Heart Failure (Adult)  Goal: Signs and Symptoms of Listed Potential Problems Will be Absent, Minimized or Managed (Cardiac: Heart Failure)  Outcome: Ongoing (interventions implemented as appropriate)

## 2019-07-13 NOTE — PROGRESS NOTES
"    Patient Name: Renuka Patel  :1940  79 y.o.      Patient Care Team:  Pa Rodriguez MD as PCP - General (Internal Medicine)  Code, Manuel GOMEZ II, MD as Consulting Physician (Hematology and Oncology)    Chief Complaint: follow up heart failure    Interval History: lost 6 lbs overnight though UOP is not that impressive.  She is feeling better but still feels very bloated in the abdominal area. She is mildly short of breath at rest.        Objective   Vital Signs  Temp:  [97.8 °F (36.6 °C)-98.8 °F (37.1 °C)] 98 °F (36.7 °C)  Heart Rate:  [69-74] 70  Resp:  [16-18] 16  BP: ()/(43-65) 113/61    Intake/Output Summary (Last 24 hours) at 2019 1050  Last data filed at 2019 0622  Gross per 24 hour   Intake 620 ml   Output 1550 ml   Net -930 ml     Flowsheet Rows      First Filed Value   Admission Height  162.6 cm (64\") Documented at 2019 0907   Admission Weight  112 kg (246 lb 6.4 oz) Documented at 07/10/2019 1704          Physical Exam:   General Appearance:    Alert, cooperative, in no acute distress   Lungs:     Clear to auscultation.  Normal respiratory effort and rate.      Heart:    Regular rhythm and normal rate, normal S1 and S2, no murmurs, gallops or rubs.     Chest Wall:    No abnormalities observed   Abdomen:     Soft, nontender, positive bowel sounds.     Extremities:   no cyanosis, clubbing or edema.  No marked joint deformities.  Adequate musculoskeletal strength.       Results Review:    Results from last 7 days   Lab Units 19  0815   SODIUM mmol/L 141   POTASSIUM mmol/L 6.0*   CHLORIDE mmol/L 99   CO2 mmol/L 19.7*   BUN mg/dL 87*   CREATININE mg/dL 2.31*   GLUCOSE mg/dL 76   CALCIUM mg/dL 8.4*     Results from last 7 days   Lab Units 19  1623 19  0526 07/10/19  1755   CK TOTAL U/L 32  --   --    TROPONIN T ng/mL  --  0.015 0.015     Results from last 7 days   Lab Units 19  0533   WBC 10*3/mm3 12.99*   HEMOGLOBIN g/dL 10.3*   HEMATOCRIT % 35.0   PLATELETS " 10*3/mm3 135*         Results from last 7 days   Lab Units 07/10/19  1755   MAGNESIUM mg/dL 2.4     Results from last 7 days   Lab Units 07/11/19  0526   CHOLESTEROL mg/dL 72   TRIGLYCERIDES mg/dL 146   HDL CHOL mg/dL 29*   LDL CHOL mg/dL 14               Medication Review:     albuterol 2.5 mg Nebulization Q4H - RT   allopurinol 100 mg Oral Daily   carvedilol 12.5 mg Oral Daily   carvedilol 12.5 mg Oral Daily   carvedilol 25 mg Oral Daily   ceFAZolin 1 g Intravenous Q12H   guaiFENesin 600 mg Oral Q12H   insulin glargine 40 Units Subcutaneous Daily   insulin lispro 0-7 Units Subcutaneous 4x Daily With Meals & Nightly   metOLazone 5 mg Oral Daily   pantoprazole 40 mg Oral QAM   potassium chloride 10 mEq Oral Daily   Fluticasone-Umeclidin-Vilant 1 each Inhalation Daily - RT          bumetanide 1 mg/hr Last Rate: 1 mg/hr (07/12/19 2117)       Assessment/Plan   1. Acute on chronic diastolic heart failure -better diuretics per renal  2. MARCIN on CKD - appreciate nephrology's assistance.     3.  Atrial Fibrillation: Previously on dabigatran, but currently held with concerns of her renal function.    9. SSS s/p [facility] PPM. She is currently paced, underlying AF  Check EKG BNP BMP and troponin in the morning    Ambreen Zhao MD    07/13/19  10:50 AM

## 2019-07-13 NOTE — PROGRESS NOTES
LOS: 3 days   Patient Care Team:  Pa Rodriguez MD as PCP - General (Internal Medicine)  Code, Manuel GOMEZ II, MD as Consulting Physician (Hematology and Oncology)    Chief Complaint/ Reason for encounter: Acute renal failure, chronic kidney disease, CHF exacerbation and diuretic management        Subjective   She feels about the same today, edema  Slightly better, still some shortness of breath, dyspnea on exertion and orthopnea  New liver lesions noted, Ct ordered      Medical history reviewed:  History of Present Illness    Subjective    History taken from: Patient and chart    Objective     Vital Signs  Temp:  [97.8 °F (36.6 °C)-98.8 °F (37.1 °C)] 98 °F (36.7 °C)  Heart Rate:  [69-74] 70  Resp:  [16-18] 16  BP: ()/(43-65) 113/61       Wt Readings from Last 1 Encounters:   07/13/19 0511 119 kg (263 lb)   07/12/19 0631 120 kg (265 lb 9.6 oz)   07/11/19 0907 123 kg (271 lb 8 oz)   07/10/19 1704 112 kg (246 lb 6.4 oz)       Objective    Objective:  General Appearance: Obese, comfortable, well-appearing, in no acute distress and not in pain.  Output: Producing urine.    HEENT: Normal HEENT exam.    Lungs:  Normal effort and normal respiratory rate.  Breath sounds clear to auscultation.  No  respiratory distress.  No rales, decreased breath sounds or rhonchi.    Heart: Normal rate.  Regular rhythm.  S1 normal.  No murmur.   Abdomen: Abdomen is soft.  Bowel sounds are normal.   There is no abdominal tenderness.  There is no epigastric area or suprapubic area tenderness.  There is no rebound tenderness.     Extremities: Normal range of motion.  Anasarca, 3+ edema  Pulses: Distal pulses are intact.    Neurological: Patient is alert and oriented to person, place and time.    Pupils:  Pupils are equal, round, and reactive to light.    Skin:  Warm and dry.  No rash or cyanosis.           Results Review:    Intake/Output:     Intake/Output Summary (Last 24 hours) at 7/13/2019 1211  Last data filed at 7/13/2019  0622  Gross per 24 hour   Intake 620 ml   Output 1550 ml   Net -930 ml         DATA:  Radiology and Labs:  The following labs independently reviewed by me. Additional labs ordered for tomorrow a.m.  Interval notes, chart personally reviewed by me.   Old records independently reviewed showing chronic kidney disease stage III baseline creatinine 1.3  The following radiologic studies independently viewed by me, renal/ gallbladder US:  CONCLUSION: Extensive abnormality of the liver highly suspicious for  metastatic lesions and further evaluation with CT scan is recommended as  described above. No gallstones are seen but there is an appearance of  wall thickening which may in part relate to trace ascites.     BILATERAL RENAL ULTRASOUND     HISTORY: Acute renal failure. Colon cancer.     Both kidneys are normal in size, the right measuring 9.8 cm in length  and the left measuring 10 cm. There is no evidence of renal obstruction.  There is a suspicion of a heterogeneous mass in the upper pole of the  left kidney measuring up to 5.1 x 6.6 cm. This is concerning for a renal  tumor and further evaluation with CT scan is recommended. The urinary  bladder is decompressed.     CONCLUSION: Suspicious solid-appearing mass involving upper pole of left  kidney and further evaluation with abdominal and pelvic CT scan is  recommended. Please also note the earlier dictated report describing  suspicious liver lesions.  Discussed with patient herself at bedside      Risk/ complexity of medical care/ medical decision making high, high risk medication, Bumex drip      Labs:   Recent Results (from the past 24 hour(s))   POC Glucose Once    Collection Time: 07/12/19  4:50 PM   Result Value Ref Range    Glucose 59 (L) 70 - 130 mg/dL   POC Glucose Once    Collection Time: 07/12/19  5:10 PM   Result Value Ref Range    Glucose 76 70 - 130 mg/dL   POC Glucose Once    Collection Time: 07/12/19  8:23 PM   Result Value Ref Range    Glucose 60 (L) 70  - 130 mg/dL   POC Glucose Once    Collection Time: 07/12/19  9:03 PM   Result Value Ref Range    Glucose 51 (L) 70 - 130 mg/dL   POC Glucose Once    Collection Time: 07/12/19 10:06 PM   Result Value Ref Range    Glucose 84 70 - 130 mg/dL   POC Glucose Once    Collection Time: 07/12/19 11:33 PM   Result Value Ref Range    Glucose 104 70 - 130 mg/dL   POC Glucose Once    Collection Time: 07/13/19 12:31 AM   Result Value Ref Range    Glucose 61 (L) 70 - 130 mg/dL   POC Glucose Once    Collection Time: 07/13/19  1:18 AM   Result Value Ref Range    Glucose 87 70 - 130 mg/dL   POC Glucose Once    Collection Time: 07/13/19  2:36 AM   Result Value Ref Range    Glucose 75 70 - 130 mg/dL   POC Glucose Once    Collection Time: 07/13/19  4:11 AM   Result Value Ref Range    Glucose 80 70 - 130 mg/dL   POC Glucose Once    Collection Time: 07/13/19  5:26 AM   Result Value Ref Range    Glucose 91 70 - 130 mg/dL   CBC Auto Differential    Collection Time: 07/13/19  5:33 AM   Result Value Ref Range    WBC 12.99 (H) 3.40 - 10.80 10*3/mm3    RBC 3.40 (L) 3.77 - 5.28 10*6/mm3    Hemoglobin 10.3 (L) 12.0 - 15.9 g/dL    Hematocrit 35.0 34.0 - 46.6 %    .9 (H) 79.0 - 97.0 fL    MCH 30.3 26.6 - 33.0 pg    MCHC 29.4 (L) 31.5 - 35.7 g/dL    RDW 22.7 (H) 12.3 - 15.4 %    RDW-SD 83.1 (H) 37.0 - 54.0 fl    MPV 14.6 (H) 6.0 - 12.0 fL    Platelets 135 (L) 140 - 450 10*3/mm3   Manual Differential    Collection Time: 07/13/19  5:33 AM   Result Value Ref Range    Neutrophil % 92.9 (H) 42.7 - 76.0 %    Lymphocyte % 2.0 (L) 19.6 - 45.3 %    Monocyte % 4.1 (L) 5.0 - 12.0 %    Myelocyte % 1.0 (H) 0.0 - 0.0 %    Neutrophils Absolute 12.07 (H) 1.70 - 7.00 10*3/mm3    Lymphocytes Absolute 0.26 (L) 0.70 - 3.10 10*3/mm3    Monocytes Absolute 0.53 0.10 - 0.90 10*3/mm3    nRBC 1.0 (H) 0.0 - 0.2 /100 WBC    Anisocytosis Mod/2+ None Seen    Macrocytes Mod/2+ None Seen    Spherocytes Slight/1+ None Seen    WBC Morphology Normal Normal    Platelet Morphology  Normal Normal   POC Glucose Once    Collection Time: 07/13/19  7:48 AM   Result Value Ref Range    Glucose 83 70 - 130 mg/dL   Renal Function Panel    Collection Time: 07/13/19  8:15 AM   Result Value Ref Range    Glucose 76 65 - 99 mg/dL    BUN 87 (H) 8 - 23 mg/dL    Creatinine 2.31 (H) 0.57 - 1.00 mg/dL    Sodium 141 136 - 145 mmol/L    Potassium 6.0 (H) 3.5 - 5.2 mmol/L    Chloride 99 98 - 107 mmol/L    CO2 19.7 (L) 22.0 - 29.0 mmol/L    Calcium 8.4 (L) 8.6 - 10.5 mg/dL    Albumin 2.30 (L) 3.50 - 5.20 g/dL    Phosphorus 6.0 (H) 2.5 - 4.5 mg/dL    Anion Gap 22.3 (H) 5.0 - 15.0 mmol/L    BUN/Creatinine Ratio 37.7 (H) 7.0 - 25.0    eGFR Non African Amer 20 (L) >60 mL/min/1.73   POC Glucose Once    Collection Time: 07/13/19 10:38 AM   Result Value Ref Range    Glucose 72 70 - 130 mg/dL   POC Glucose Once    Collection Time: 07/13/19 11:14 AM   Result Value Ref Range    Glucose 82 70 - 130 mg/dL       Radiology:  Imaging Results (last 24 hours)     Procedure Component Value Units Date/Time    CT Abdomen Pelvis Without Contrast [[id number]] Updated:  07/13/19 0950    US Gallbladder [[id number]] Collected:  07/12/19 1711     Updated:  07/12/19 1724    Narrative:       GALLBLADDER ULTRASOUND     HISTORY: Elevated liver function tests. Previous colon cancer.     The examination was performed as an emergency procedure. The gallbladder  shows no gallstones. There appears to be generalized wall thickening of  the gallbladder which may in part relate to trace ascites. The common  bile duct is normal in caliber.     There are numerous liver lesions measuring up to 4.7 cm and highly  suspicious for extensive metastatic liver disease and further evaluation  with abdominal and pelvic CT scan is therefore recommended. The  visualized pancreas and right kidney are unremarkable.     CONCLUSION: Extensive abnormality of the liver highly suspicious for  metastatic lesions and further evaluation with CT scan is recommended as  described  above. No gallstones are seen but there is an appearance of  wall thickening which may in part relate to trace ascites.     BILATERAL RENAL ULTRASOUND     HISTORY: Acute renal failure. Colon cancer.     Both kidneys are normal in size, the right measuring 9.8 cm in length  and the left measuring 10 cm. There is no evidence of renal obstruction.  There is a suspicion of a heterogeneous mass in the upper pole of the  left kidney measuring up to 5.1 x 6.6 cm. This is concerning for a renal  tumor and further evaluation with CT scan is recommended. The urinary  bladder is decompressed.     CONCLUSION: Suspicious solid-appearing mass involving upper pole of left  kidney and further evaluation with abdominal and pelvic CT scan is  recommended. Please also note the earlier dictated report describing  suspicious liver lesions.     This report was finalized on 7/12/2019 5:21 PM by Dr. Alfonso Nelson M.D.       US Renal Bilateral [[id number]] Collected:  07/12/19 1711     Updated:  07/12/19 1724    Narrative:       GALLBLADDER ULTRASOUND     HISTORY: Elevated liver function tests. Previous colon cancer.     The examination was performed as an emergency procedure. The gallbladder  shows no gallstones. There appears to be generalized wall thickening of  the gallbladder which may in part relate to trace ascites. The common  bile duct is normal in caliber.     There are numerous liver lesions measuring up to 4.7 cm and highly  suspicious for extensive metastatic liver disease and further evaluation  with abdominal and pelvic CT scan is therefore recommended. The  visualized pancreas and right kidney are unremarkable.     CONCLUSION: Extensive abnormality of the liver highly suspicious for  metastatic lesions and further evaluation with CT scan is recommended as  described above. No gallstones are seen but there is an appearance of  wall thickening which may in part relate to trace ascites.     BILATERAL RENAL ULTRASOUND      HISTORY: Acute renal failure. Colon cancer.     Both kidneys are normal in size, the right measuring 9.8 cm in length  and the left measuring 10 cm. There is no evidence of renal obstruction.  There is a suspicion of a heterogeneous mass in the upper pole of the  left kidney measuring up to 5.1 x 6.6 cm. This is concerning for a renal  tumor and further evaluation with CT scan is recommended. The urinary  bladder is decompressed.     CONCLUSION: Suspicious solid-appearing mass involving upper pole of left  kidney and further evaluation with abdominal and pelvic CT scan is  recommended. Please also note the earlier dictated report describing  suspicious liver lesions.     This report was finalized on 7/12/2019 5:21 PM by Dr. Alfonso Nelson M.D.                Medications have been reviewed:  Current Facility-Administered Medications   Medication Dose Route Frequency Provider Last Rate Last Dose   • acetaminophen (TYLENOL) tablet 650 mg  650 mg Oral Q4H PRN Bhupendra De MD   650 mg at 07/11/19 2243   • albuterol (PROVENTIL) nebulizer solution 0.083% 2.5 mg/3mL  2.5 mg Nebulization Q4H - RT Bhupendra De MD   2.5 mg at 07/12/19 1907   • allopurinol (ZYLOPRIM) tablet 100 mg  100 mg Oral Daily Bhupendra De MD   100 mg at 07/13/19 0803   • bumetanide (BUMEX) 10 mg in sodium chloride 0.9 % 100 mL (0.1 mg/mL) infusion  1 mg/hr Intravenous Continuous Rommel Gerardo MD 10 mL/hr at 07/13/19 1141 1 mg/hr at 07/13/19 1141   • carvedilol (COREG) tablet 12.5 mg  12.5 mg Oral Daily Bhupendra De MD   12.5 mg at 07/11/19 2243   • carvedilol (COREG) tablet 12.5 mg  12.5 mg Oral Daily Bhupendra De MD   12.5 mg at 07/12/19 1459   • carvedilol (COREG) tablet 25 mg  25 mg Oral Daily Bhupendra De MD   25 mg at 07/13/19 0803   • ceFAZolin (ANCEF) IVPB 1 g  1 g Intravenous Q12H Bhupendra De MD   1 g at 07/13/19 0439   • dextrose (D50W) 25 g/ 50mL Intravenous Solution 25 g  25 g Intravenous Q15 Min PRN Bhupendra De MD   25 g  at 07/13/19 0037   • dextrose (GLUTOSE) oral gel 15 g  15 g Oral Q15 Min PRN Bhupendra De MD       • glucagon (human recombinant) (GLUCAGEN DIAGNOSTIC) injection 1 mg  1 mg Subcutaneous Q15 Min PRN Bhupendra De MD       • guaiFENesin (MUCINEX) 12 hr tablet 600 mg  600 mg Oral Q12H Bhupendra De MD   600 mg at 07/13/19 0803   • insulin glargine (LANTUS) injection 40 Units  40 Units Subcutaneous Daily Bhupendra De MD       • insulin lispro (humaLOG) injection 0-7 Units  0-7 Units Subcutaneous 4x Daily With Meals & Nightly Bhupendra De MD   2 Units at 07/11/19 1259   • metOLazone (ZAROXOLYN) tablet 5 mg  5 mg Oral Daily Rommel Gerardo MD   5 mg at 07/13/19 0803   • nitroglycerin (NITROSTAT) SL tablet 0.4 mg  0.4 mg Sublingual Q5 Min PRN Bhupendra De MD       • ondansetron (ZOFRAN) injection 4 mg  4 mg Intravenous Q6H PRN Bhupendra De MD   4 mg at 07/13/19 0815   • pantoprazole (PROTONIX) EC tablet 40 mg  40 mg Oral QAM Bhupendra De MD   40 mg at 07/13/19 0640   • traZODone (DESYREL) tablet 100 mg  100 mg Oral Nightly PRN Bhupendra De MD   100 mg at 07/11/19 2244   • TRELEGY (Fluticasone-Umeclidin-Vilant)100-62.5-25 MCG/INH IN  1 each Inhalation Daily - RT Bhupendra De MD   1 each at 07/12/19 1009       ASSESSMENT:  acute renal failure secondary to decompensated heart failure, suboptimal response to Bumex drip thus far, added metolazone, worse again today  Acute on chronic diastolic CHF, fluid overload unchanged, weight slowly better  Anasarca, improved slowly but poor diuretic response so far  Stage III chronic kidney disease, baseline creatinine 1.3-1.5  Obesity  Multiple liver lesions, likely metastatic disease, awai CT  prior adrenal mass, await CT, very poor surgical candidate, unable to tolerate any anesthesia  Renal cystic disease  Coronary artery disease  Atrial fibrillation  Anemia, in part due to chronic kidney disease  Obstructive sleep apnea on CPAP  Ischemic cardiomyopathy      PLAN:  UOP  still suboptimal  Will continue daily metolazone + Bumex drip for now  K also high today, treat medically  She is a very poor dialysis candidate with multiple underlying medical problems and unable to tolerate any anesthesia, if renal function continues to deteriorate then palliative care would be most appropriate, especially if she has metastatic cancer to the liver  renal ultrasound noted, negative additional urine studies, urine electrolytes not prerenal appearing as would be expected with cardiorenal syndrome  stop potassium, monitor electrolytes closely on high-dose diuretics  Await CT to better characterize renal and liver lesions, ? metastatic disease  kayexelate and calcium gluconate ordered    Continue to monitor electrolytes and volume closely, avoid IV contrast and nephrotoxic medications   Please call with any questions or concerns.       Rommel Gerardo MD   [facility]   Office phone number: [phone]  Answering service phone number: [phone]    07/13/19  12:11 PM      Dictation performed using Dragon dictation software

## 2019-07-14 NOTE — PLAN OF CARE
Problem: Patient Care Overview  Goal: Plan of Care Review  Outcome: Ongoing (interventions implemented as appropriate)   07/14/19 0802   Coping/Psychosocial   Plan of Care Reviewed With patient;family   Plan of Care Review   Progress declining   OTHER   Outcome Summary Pt transferred to CCU after intubation on floor, pt went completely unresponsive before transfer. Central line placed for poor IV access and pressor requirements. Large dark bowel movement noted with coffee ground emesis, protonix gtt started and GI consulted. Potasium remains high this morning, renal updated. Family updated on changes.        Problem: Restraint, Nonbehavioral (Nonviolent)  Goal: Rationale and Justification  Outcome: Ongoing (interventions implemented as appropriate)   07/14/19 0802   Restraint, Nonbehavioral (Nonviolent)   Rationale and Justification prevent line/tube removal     Goal: Nonbehavioral (Nonviolent) Restraint: Absence of Injury/Harm  Outcome: Ongoing (interventions implemented as appropriate)   07/14/19 0802   Restraint, Nonbehavioral (Nonviolent)   Nonbehavioral (Nonviolent) Restraint: Absence of Injury/Harm met     Goal: Nonbehavioral (Nonviolent) Restraint: Achievement of Discontinuation Criteria  Outcome: Ongoing (interventions implemented as appropriate)   07/14/19 0802   Restraint, Nonbehavioral (Nonviolent)   Nonbehavioral (Nonviolent) Restraint: Achievement of Discontinuation Criteria not met     Goal: Nonbehavioral (Nonviolent) Restraint: Preservation of Dignity and Wellbeing  Outcome: Ongoing (interventions implemented as appropriate)   07/14/19 0802   Restraint, Nonbehavioral (Nonviolent)   Nonbehavioral (Nonviolent) Restraint: Preservation of Dignity and Wellbeing met

## 2019-07-14 NOTE — PROCEDURES
Endotracheal Intubation Procedure Note    Indication for endotracheal intubation: respiratory failure.  Sedation: Propofol 10cc IV push.  Equipment: A video GlideScope was used and Video laryngoscope was used and size 7.5 ET tube was easily introduced between the vocal cords into the trachea..  Number of attempts: 1.  ETT location confirmed by by auscultation, by CXR and ETCO2 monitor.    No immediate complications were noted.    Emil Whiting MD  7/14/2019

## 2019-07-14 NOTE — PROGRESS NOTES
LOS: 4 days   Patient Care Team:  Pa Rodriguez MD as PCP - General (Internal Medicine)  Code, Manuel GOMEZ II, MD as Consulting Physician (Hematology and Oncology)    Chief Complaint/ Reason for encounter: Acute renal failure, chronic kidney disease, CHF exacerbation and diuretic management, now with hyperkalemia and oliguria        Subjective   Events noted, very rough night.  Around 2 AM a rapid response was called due to increased work of breathing, patient was emergently intubated and transferred to the ICU.  During the rapid response patient had to large bloody bowel movements, pressure dropped overnight and she is now maxed on Levophed.  Her urine output has been decreasing overnight and her potassium level remains elevated despite medical treatment.  I had a long discussion with her son and daughter at bedside, we discussed her multiple comorbidities, worsening renal failure and new discovery of multiple lesions on liver ultrasound and CT consistent with likely metastatic disease, possibly from her prior colon cancer.  Family has decided to change CODE STATUS to DO NOT RESUSCITATE and we had a long discussion about goals of care.  We were all in agreement that with all of her comorbidities dialysis would not be in her best interest especially with a new diagnosis of likely stage IV cancer.  The family is going to discuss further today and they did indicate that they may decide to transition to comfort care and withdrawal all current life support measures.  Her prognosis is extremely poor at this point and dialysis is not appropriate in someone with multiple organ failure and metastatic cancer.  Family agrees with this assessment.  They state that they just want to make her comfortable at this time.    Medical history reviewed:  History of Present Illness    Subjective    History taken from: Patient and chart    Objective     Vital Signs  Temp:  [97.4 °F (36.3 °C)-98.6 °F (37 °C)] 97.6 °F (36.4 °C)  Heart  Rate:  [69-84] 70  Resp:  [16-21] 20  BP: ()/(26-68) 89/46  FiO2 (%):  [40 %-100 %] 40 %       Wt Readings from Last 1 Encounters:   07/14/19 0300 124 kg (274 lb 7.6 oz)   07/13/19 0511 119 kg (263 lb)   07/12/19 0631 120 kg (265 lb 9.6 oz)   07/11/19 0907 123 kg (271 lb 8 oz)   07/10/19 1704 112 kg (246 lb 6.4 oz)       Objective    Objective:  General Appearance: Obese, comfortable, ill-appearing, intubated and sedated on the ventilator, decreased urine output.   Morbidly obese  HEENT: Endotracheal tube in place  Lungs:  Normal effort and normal respiratory rate.  Breath sounds clear to auscultation.  No  respiratory distress.  No rales, decreased breath sounds or rhonchi.    Heart: Normal rate.  Regular rhythm.  S1 normal.  No murmur.   Abdomen: Abdomen is soft.  Bowel sounds are normal.   There is no abdominal tenderness.  There is no epigastric area or suprapubic area tenderness.  There is no rebound tenderness.     Extremities: Normal range of motion.  Anasarca, 3+ edema, unchanged, serum areas of erythema bilateral lower extremities  Pulses: Distal pulses are intact.    Neurological: Sedated, nonverbal  Pupils: Not assessed   Skin:  Warm and dry.  No rash or cyanosis.           Results Review:    Intake/Output:     Intake/Output Summary (Last 24 hours) at 7/14/2019 0918  Last data filed at 7/14/2019 0824  Gross per 24 hour   Intake 930 ml   Output 500 ml   Net 430 ml         DATA:  Radiology and Labs:  The following labs independently reviewed by me. Additional labs ordered for tomorrow a.m.  Interval notes, chart personally reviewed by me.   Old records independently reviewed showing chronic kidney disease stage III baseline creatinine 1.3  New problems: Acute respiratory failure, hyperkalemia, oliguric renal failure, likely stage IV metastatic cancer to the liver, unknown primary but with history of colon cancer.    Risk/ complexity of medical care/ medical decision making high, high risk medication,  Bumex drip    Patient is critically ill and I was called emergently to see due to the critical nature of the illness, worsening renal failure with hyperkalemia.  The acute nature of the renal failure carries a high probability of imminent and life-threatening deterioration in the patient's clinical condition if the renal failure is not aggressively treated.  Medical decision making is highly complex due to the critical nature of the patient's illness, need for ICU care, coordination of care.  Management of the renal failure requires careful assessment, manipulation and support of vital organ system functions to treat acute renal failure and prevent further deterioration of the patient's critical condition.  This consisted of a thorough review of pertinent laboratory tests, diagnostic tests, medical records and conversations with other physicians and medical staff directly involved in the patient care.  Care was discussed with family members as patient's critical nature makes it difficult to obtain adequate history from the patient.  Total critical care time spent was 38 minutes, from 745 to 8:15 AM, 920 to 9:28 AM for charting and   Difficult amount of time was spent with family discussing goals of care, prognosis, they were updated on her clinical condition and all results of pertinent lab and diagnostic testing      Labs:   Recent Results (from the past 24 hour(s))   POC Glucose Once    Collection Time: 07/13/19 10:38 AM   Result Value Ref Range    Glucose 72 70 - 130 mg/dL   POC Glucose Once    Collection Time: 07/13/19 11:14 AM   Result Value Ref Range    Glucose 82 70 - 130 mg/dL   POC Glucose Once    Collection Time: 07/13/19 12:22 PM   Result Value Ref Range    Glucose 81 70 - 130 mg/dL   POC Glucose Once    Collection Time: 07/13/19  2:11 PM   Result Value Ref Range    Glucose 71 70 - 130 mg/dL   POC Glucose Once    Collection Time: 07/13/19  5:14 PM   Result Value Ref Range    Glucose 83 70 - 130  mg/dL   Potassium    Collection Time: 07/13/19  6:15 PM   Result Value Ref Range    Potassium 6.0 (H) 3.5 - 5.2 mmol/L   POC Glucose Once    Collection Time: 07/13/19  8:21 PM   Result Value Ref Range    Glucose 99 70 - 130 mg/dL   POC Glucose Once    Collection Time: 07/14/19  1:49 AM   Result Value Ref Range    Glucose 123 70 - 130 mg/dL   Blood Gas, Arterial    Collection Time: 07/14/19  2:16 AM   Result Value Ref Range    Site Arterial: left brachial     Joaquim's Test N/A     pH, Arterial 7.012 (C) 7.350 - 7.450 pH units    pCO2, Arterial 69.4 (C) 35.0 - 45.0 mm Hg    pO2, Arterial 76.7 (L) 80.0 - 100.0 mm Hg    HCO3, Arterial 17.6 (L) 22.0 - 28.0 mmol/L    Base Excess, Arterial -13.6 (L) 0.0 - 2.0 mmol/L    O2 Saturation Calculated 85.7 (L) 92.0 - 99.0 %    Barometric Pressure for Blood Gas 752.4 mmHg    Modality Cannula     Flow Rate 4 lpm    Rate 28 Breaths/minute   BNP    Collection Time: 07/14/19  3:39 AM   Result Value Ref Range    proBNP 9,889.0 (H) 5.0-1,800.0 pg/mL   Comprehensive Metabolic Panel    Collection Time: 07/14/19  3:39 AM   Result Value Ref Range    Glucose 101 (H) 65 - 99 mg/dL    BUN 95 (H) 8 - 23 mg/dL    Creatinine 3.16 (H) 0.57 - 1.00 mg/dL    Sodium 144 136 - 145 mmol/L    Potassium 6.6 (C) 3.5 - 5.2 mmol/L    Chloride 99 98 - 107 mmol/L    CO2 14.7 (L) 22.0 - 29.0 mmol/L    Calcium 7.2 (L) 8.6 - 10.5 mg/dL    Total Protein 4.1 (L) 6.0 - 8.5 g/dL    Albumin 1.80 (L) 3.50 - 5.20 g/dL    ALT (SGPT) 19 1 - 33 U/L    AST (SGOT) 257 (H) 1 - 32 U/L    Alkaline Phosphatase 868 (H) 39 - 117 U/L    Total Bilirubin 1.1 0.2 - 1.2 mg/dL    eGFR Non African Amer 14 (L) >60 mL/min/1.73    eGFR  African Amer  >60 mL/min/1.73    Globulin 2.3 gm/dL    A/G Ratio 0.8 g/dL    BUN/Creatinine Ratio 30.1 (H) 7.0 - 25.0    Anion Gap 30.3 (H) 5.0 - 15.0 mmol/L   Protime-INR    Collection Time: 07/14/19  3:39 AM   Result Value Ref Range    Protime 29.2 (H) 11.7 - 14.2 Seconds    INR 2.80 (H) 0.90 - 1.10   aPTT     Collection Time: 07/14/19  3:39 AM   Result Value Ref Range    PTT 68.0 (H) 22.7 - 35.4 seconds   Type & Screen    Collection Time: 07/14/19  3:39 AM   Result Value Ref Range    ABO Type A     RH type Positive     Antibody Screen Negative     T&S Expiration Date 7/17/2019 11:59:59 PM    CBC Auto Differential    Collection Time: 07/14/19  3:39 AM   Result Value Ref Range    WBC 14.37 (H) 3.40 - 10.80 10*3/mm3    RBC 2.71 (L) 3.77 - 5.28 10*6/mm3    Hemoglobin 8.2 (L) 12.0 - 15.9 g/dL    Hematocrit 28.7 (L) 34.0 - 46.6 %    .9 (H) 79.0 - 97.0 fL    MCH 30.3 26.6 - 33.0 pg    MCHC 28.6 (L) 31.5 - 35.7 g/dL    RDW 22.5 (H) 12.3 - 15.4 %    RDW-SD 84.0 (H) 37.0 - 54.0 fl    MPV 13.4 (H) 6.0 - 12.0 fL    Platelets 156 140 - 450 10*3/mm3   Manual Differential    Collection Time: 07/14/19  3:39 AM   Result Value Ref Range    Neutrophil % 92.0 (H) 42.7 - 76.0 %    Lymphocyte % 5.0 (L) 19.6 - 45.3 %    Monocyte % 2.0 (L) 5.0 - 12.0 %    Metamyelocyte % 1.0 (H) 0.0 - 0.0 %    Neutrophils Absolute 13.22 (H) 1.70 - 7.00 10*3/mm3    Lymphocytes Absolute 0.72 0.70 - 3.10 10*3/mm3    Monocytes Absolute 0.29 0.10 - 0.90 10*3/mm3    nRBC 1.0 (H) 0.0 - 0.2 /100 WBC    Anisocytosis Mod/2+ None Seen    Macrocytes Mod/2+ None Seen    Poikilocytes Slight/1+ None Seen    Polychromasia Slight/1+ None Seen    Smudge Cells Slight/1+ None Seen    Platelet Morphology Normal Normal   Phosphorus    Collection Time: 07/14/19  3:39 AM   Result Value Ref Range    Phosphorus 8.7 (H) 2.5 - 4.5 mg/dL   Lactic Acid, Plasma    Collection Time: 07/14/19  3:41 AM   Result Value Ref Range    Lactate 10.7 (C) 0.5 - 2.0 mmol/L   Procalcitonin    Collection Time: 07/14/19  3:41 AM   Result Value Ref Range    Procalcitonin 3.20 (H) 0.10 - 0.25 ng/mL   Lactic Acid, Reflex Timer (This will reflex a repeat order 3-3:15 hours after ordered.)    Collection Time: 07/14/19  3:41 AM   Result Value Ref Range    Extra Tube Hold for add-ons.    Blood Gas,  Arterial    Collection Time: 07/14/19  4:00 AM   Result Value Ref Range    Site Arterial: left brachial     Joaquim's Test N/A     pH, Arterial 7.184 (C) 7.350 - 7.450 pH units    pCO2, Arterial 45.1 (H) 35.0 - 45.0 mm Hg    pO2, Arterial 261.7 (H) 80.0 - 100.0 mm Hg    HCO3, Arterial 17.0 (L) 22.0 - 28.0 mmol/L    Base Excess, Arterial -10.8 (L) 0.0 - 2.0 mmol/L    O2 Saturation Calculated 99.8 (H) 92.0 - 99.0 %    A-a Gradiant 0.5 mmHg    Barometric Pressure for Blood Gas 752.1 mmHg    Modality Adult Vent     FIO2 80 %    Ventilator Mode AC     Set Tidal Volume 500     Set Mech Resp Rate 16     Rate 21 Breaths/minute    PEEP 5    POC Glucose Once    Collection Time: 07/14/19  4:04 AM   Result Value Ref Range    Glucose 98 70 - 130 mg/dL   Prepare RBC, 2 Units    Collection Time: 07/14/19  4:56 AM   Result Value Ref Range    Product Code S7208B40     Unit Number [id number]     UNIT  ABO A     UNIT  RH POS     Dispense Status XM     Blood Type APOS     Blood Expiration Date 091853195842     Blood Type Barcode 6200     Product Code U7096O25     Unit Number [id number]     UNIT  ABO A     UNIT  RH POS     Dispense Status XM     Blood Type APOS     Blood Expiration Date 746340044288     Blood Type Barcode 6200    Prepare Fresh Frozen Plasma, 2 Units    Collection Time: 07/14/19  6:21 AM   Result Value Ref Range    Product Code L3338Y27     Unit Number [id number]     UNIT  ABO A     UNIT  RH POS     Dispense Status IS     Blood Type APOS     Blood Expiration Date 294653437097     Blood Type Barcode 6200     Product Code S7943A14     Unit Number [id number]     UNIT  ABO A     UNIT  RH POS     Dispense Status XM     Blood Type APOS     Blood Expiration Date 458931038881     Blood Type Barcode 6200    POC Glucose Once    Collection Time: 07/14/19  7:29 AM   Result Value Ref Range    Glucose 123 70 - 130 mg/dL       Radiology:  Imaging Results (last 24 hours)     Procedure Component Value Units Date/Time     XR Chest 1 View [[id number]] Collected:  19 0343     Updated:  19 0348    Narrative:       PORTABLE CHEST X-RAY     CLINICAL HISTORY: central line, ETT; R53.1-Weakness     COMPARISON: 07/10/2019.     FINDINGS: Portable AP view of the chest was obtained with overlying  monitor leads in place. ET tube tube terminates at the level of the mid  thoracic trachea. Right IJ central line terminates in the region of the  SVC. Left-sided pacemaker unchanged. No pneumothorax. Lungs are fairly  well inflated. Persistent left lung base opacity, likely combination of  airspace disease and pleural fluid. Right lung is clear other than  calcified residua of granulomatous disease. There is pulmonary vascular  congestion. Stable marked cardiomegaly.             Impression:       Life support line additions as above, no pneumothorax        This report was finalized on 2019 3:45 AM by Genaro Rocha M.D.       CT Abdomen Pelvis Without Contrast [[id number]] Collected:  19 1221     Updated:  19 1225    Narrative:       CT SCAN ABDOMEN AND PELVIS WITHOUT CONTRAST     HISTORY: Previous colon cancer with recent ultrasound exam suspicious  for liver metastases. Also possible left renal mass.     TECHNIQUE/FINDINGS: The CT scan was performed through the abdomen and  pelvis without contrast and demonstrates the followin. The exam is limited by the patient's marked obesity and lack of  intravenous contrast. The liver is enlarged with a heterogeneous texture  that particularly involves large portions of the right lobe. Given the  appearance on ultrasound, I suspect this relates to extensive  involvement of the liver with metastatic disease and CT-guided biopsy  may be helpful versus further evaluation with contrast enhancement or  MRI.  2. There are small bilateral pleural effusions with some adjacent dense  atelectasis at the lung bases. I cannot completely exclude some  developing pneumonia at the left base.  There is a small pericardial  effusion measuring 7 mm in thickness.  3. The spleen is normal in size and contains several calcified  granulomas. There is a small cystic lesion at the anterior margin of the  spleen measuring 1.7 cm and I suspect this represents a splenic cyst  showing very slight enlargement since a CT scan dating back to  01/20/2014.  4. The gallbladder is quite contracted and may contain some dense  sludge. There is a small amount of ascites in the abdomen that extends  into the pelvis which is nonspecific but may relate to extensive  metastatic liver disease.  5. The recent ultrasound exam raised the concern of a solid mass  involving the upper pole of the left kidney. This actually represents a  large left adrenal mass containing areas of calcification and measuring  up to 6 cm. This shows further calcification and slight enlargement  since the exam dating back to 2014 and likely relates to a large adrenal  adenoma. The right adrenal gland appears normal.  6. There is mild generalized cortical thinning involving both kidneys  and there is a small exophytic cyst involving the left kidney as also  noted in 2014. There is no renal obstruction.  7. There is an infrarenal abdominal aortic aneurysm measuring 4.6 cm  compared to 3.6 cm in 2014. There is no retroperitoneal lymphadenopathy.  8. The large and small bowel loops are normal in caliber and show no  inflammatory change. The remainder of the pelvis is unremarkable.  9. At bone windows, there is some minimal compression deformity  involving the superior endplate of T12. There is prominent degenerative  change at L4-5 with a vacuum phenomenon and some minimal anterior  subluxation of L4 combined with severe facet spurring. No suspicious  lytic bone lesions are seen.                 Radiation dose reduction techniques were utilized, including automated  exposure control and exposure modulation based on body size.     This report was finalized on  7/13/2019 12:22 PM by Dr. Alfonso Nelson M.D.                Medications have been reviewed:  Current Facility-Administered Medications   Medication Dose Route Frequency Provider Last Rate Last Dose   • acetaminophen (TYLENOL) tablet 650 mg  650 mg Oral Q4H PRN Bhupendra De MD   650 mg at 07/13/19 2000   • albuterol sulfate HFA (PROVENTIL HFA;VENTOLIN HFA;PROAIR HFA) inhaler 6 puff  6 puff Inhalation Q4H - RT Emil Whiting MD   6 puff at 07/14/19 0700   • allopurinol (ZYLOPRIM) tablet 100 mg  100 mg Oral Daily Bhupendra De MD   100 mg at 07/13/19 0803   • ceFAZolin (ANCEF) IVPB 1 g  1 g Intravenous Q12H Bhupendra De MD   1 g at 07/14/19 0352   • dextrose (D50W) 25 g/ 50mL Intravenous Solution 25 g  25 g Intravenous Q15 Min PRN Bhupendra De MD   25 g at 07/13/19 0037   • dextrose (GLUTOSE) oral gel 15 g  15 g Oral Q15 Min PRN Bhupendra De MD       • fentaNYL citrate (PF) (SUBLIMAZE) injection 50 mcg  50 mcg Intravenous Q1H PRN Emil Whiting MD   50 mcg at 07/14/19 0743   • glucagon (human recombinant) (GLUCAGEN DIAGNOSTIC) injection 1 mg  1 mg Subcutaneous Q15 Min PRN Bhupendra De MD       • guaiFENesin (MUCINEX) 12 hr tablet 600 mg  600 mg Oral Q12H Bhupendra De MD   600 mg at 07/13/19 2000   • insulin lispro (humaLOG) injection 0-7 Units  0-7 Units Subcutaneous 4x Daily With Meals & Nightly Bhupendra De MD   2 Units at 07/11/19 1259   • nitroglycerin (NITROSTAT) SL tablet 0.4 mg  0.4 mg Sublingual Q5 Min PRN Bhupendra De MD       • norepinephrine (LEVOPHED) 8 mg/250 mL (32 mcg/mL) in sodium chloride 0.9% infusion (premix)  0.02-0.3 mcg/kg/min Intravenous Titrated Emil Whiting MD 70.3 mL/hr at 07/14/19 0858 0.3 mcg/kg/min at 07/14/19 0858   • ondansetron (ZOFRAN) injection 4 mg  4 mg Intravenous Q6H PRN Bhupendra De MD   4 mg at 07/13/19 1438   • pantoprazole (PROTONIX) 40 mg/100 mL (0.4 mg/mL) in 0.9% NS IVPB  8 mg/hr Intravenous Continuous Emil Whiting MD 20 mL/hr at 07/14/19 0904 8 mg/hr  at 07/14/19 0904   • propofol (DIPRIVAN) infusion 10 mg/mL 100 mL  5-50 mcg/kg/min Intravenous Titrated Emil Whiting MD   Stopped at 07/14/19 0409   • TRELEGY (Fluticasone-Umeclidin-Vilant)100-62.5-25 MCG/INH IN  1 each Inhalation Daily - RT Bhupendra De MD   1 each at 07/12/19 1009   • vasopressin (PITRESSIN) 20 Units in sodium chloride 0.9 % 100 mL (0.2 Units/mL) infusion  0.03 Units/min Intravenous Titrated Emil Whiting MD 9 mL/hr at 07/14/19 0824 0.03 Units/min at 07/14/19 0824       ASSESSMENT:  acute renal failure secondary to decompensated heart failure, worsening overnight after rapid response  Worsening hypotension, now on pressors  Acute hypoxemic respiratory failure on the ventilator  Worsening, diastolic CHF, worsening volume overload  Anasarca  Stage III chronic kidney disease, baseline creatinine 1.3-1.5  Obesity  Multiple liver lesions, likely metastatic disease, CT confirms this though suboptimal study due to lack of IV contrast which could not be given due to renal failure  Adrenal mass with calcifications, likely benign adenoma  Renal cystic disease  Coronary artery disease  Atrial fibrillation  Prior colon cancer and breast cancer  Anemia, in part due to chronic kidney disease  Obstructive sleep apnea on CPAP  Ischemic cardiomyopathy      PLAN:  Events noted, she is doing considerably worse today; now with oliguric renal failure, hypotension and respiratory failure on the ventilator  Family would like to change CODE STATUS to DO NOT RESUSCITATE  We also discussed findings on ultrasound and CT that show likely significant metastatic disease to the liver  She is not an appropriate dialysis candidate at this time with probable stage IV cancer and multiple organ failure  Family agrees not to start dialysis at this time, they are aware that her prognosis is extremely poor regardless but without dialysis she is unlikely to survive more than a few days  They are going to discuss amongst  themselves and possibly transition to comfort care today  Diuretics on hold due to hypotension  Potassium was treated medically this morning, await repeat level  Will continue to monitor closely in the ICU  Critically ill  D/w Dr. Lowery in the CCU  Total of 38 minutes critical care time spent    Continue to monitor electrolytes and volume closely, avoid IV contrast and nephrotoxic medications   Please call with any questions or concerns.       Rommel Gerardo MD   [facility]   Office phone number: [phone]  Answering service phone number: [phone]    07/14/19  9:17 AM      Dictation performed using Dragon dictation software

## 2019-07-14 NOTE — PROGRESS NOTES
"Pharmacy Consult - Protonix Jessica Patel has been consulted for Protonix dosing for GI Bleeding  Pharmacy dosing per Dr Whiting's request.       Relevant clinical data and objective history reviewed:  79 y.o. female 162.6 cm (64\")   124 kg (274 lb 7.6 oz)   Ideal body weight: 54.7 kg (120 lb 9.5 oz)  Adjusted ideal body weight: 82.6 kg (182 lb 2.3 oz)    Active Ambulatory Problems     Diagnosis Date Noted   • Abdominal aortic aneurysm (CMS/HCC) 03/11/2016   • Chronic coronary artery disease 03/11/2016   • Atrial fibrillation (CMS/HCC) 03/11/2016   • Cardiomyopathy (CMS/HCC) 03/11/2016   • Cancer of transverse colon (CMS/HCC) 03/11/2016   • Acute on chronic combined systolic and diastolic CHF (congestive heart failure) (CMS/HCC) 03/11/2016   • Type 2 diabetes with stage 3 chronic kidney disease GFR 30-59 (CMS/HCC) 03/11/2016   • Shortness of breath 03/11/2016   • Hyperlipidemia 03/11/2016   • Hypertension 03/11/2016   • Hernia of anterior abdominal wall 03/11/2016   • Vitamin D deficiency 03/11/2016   • Iron deficiency anemia refractory to iron therapy 04/25/2016   • Anemia due to stage 3 chronic kidney disease treated with erythropoietin (CMS/HCC) 04/25/2016   • HAYDEN (obstructive sleep apnea)    • Morbid obesity with BMI of 40.0-44.9, adult (CMS/HCC) 08/25/2016   • COPD (chronic obstructive pulmonary disease) (CMS/HCC) 08/25/2016   • Mitral regurgitation, moderate 08/25/2016   • Uncontrolled type 2 diabetes mellitus with complication, with long-term current use of insulin (CMS/HCC) 04/04/2017   • H/O cardiac pacemaker 03/21/2018   • Stage 3 chronic kidney disease (CMS/HCC) 07/23/2018   • Malabsorption of iron 12/18/2018     Resolved Ambulatory Problems     Diagnosis Date Noted   • Acute bronchitis 03/11/2016   • Anemia 03/11/2016   • Acute nasal catarrh 03/11/2016   • Cough 03/11/2016   • Dizziness 03/11/2016   • Candidiasis of mouth 03/11/2016   • Diabetes mellitus type 2, controlled, without complications " (CMS/HCC) 03/11/2016   • Acute respiratory failure with hypoxia (CMS/HCC) 08/24/2016   • Pericardial effusion without cardiac tamponade 08/25/2016   • Acute URI 08/25/2016   • Acute gout 08/26/2016     Past Medical History:   Diagnosis Date   • Abdominal aortic aneurysm (CMS/HCC)    • Acute bronchitis    • Anemia    • Anxiety    • Atrial fibrillation (CMS/HCC) 12/2010   • Back pain    • Breast cancer (CMS/[facility])    • CAD (coronary artery disease)    • Cardiomyopathy (CMS/HCC)    • CHF (congestive heart failure) (CMS/[facility])    • Chronic kidney disease    • Colon cancer (CMS/HCC)    • COPD (chronic obstructive pulmonary disease) (CMS/[facility])    • Esophageal reflux    • Hernia of anterior abdominal wall 3/11/2016   • Hyperlipidemia    • Hypertension    • Joint pain    • Mass of adrenal gland (CMS/[facility])    • Morbid obesity (CMS/[facility])    • Non-rheumatic mitral regurgitation    • NSTEMI (non-ST elevated myocardial infarction) (CMS/[facility]) 11/2013   • Oral thrush    • HAYDEN on CPAP    • Osteoarthritis    • Pneumonia    • Shortness of breath    • Sick sinus syndrome (CMS/[facility])    • Strong family history of breast cancer    • Type 2 diabetes mellitus (CMS/[facility])    • Ventral hernia    • Vitamin D deficiency      Allergies:   Allergies as of 07/10/2019 - Reviewed 07/10/2019   Allergen Reaction Noted   • Codeine Itching 02/15/2016   • Hydralazine hcl  04/19/2016   • Lisinopril Cough 02/15/2016           Lab Results   Component Value Date    GLUCOSE 76 07/13/2019    CALCIUM 8.4 (L) 07/13/2019     07/13/2019    K 6.0 (H) 07/13/2019    CO2 19.7 (L) 07/13/2019    CL 99 07/13/2019    BUN 87 (H) 07/13/2019    CREATININE 2.31 (H) 07/13/2019    EGFRIFAFRI 56 (L) 05/02/2019    EGFRIFNONA 20 (L) 07/13/2019    BCR 37.7 (H) 07/13/2019    ANIONGAP 22.3 (H) 07/13/2019     Lab Results   Component Value Date    WBC 12.99 (H) 07/13/2019    HGB 10.3 (L) 07/13/2019    HCT 35.0 07/13/2019    .9 (H) 07/13/2019     (L) 07/13/2019       Estimated  Creatinine Clearance: 25.4 mL/min (A) (by C-G formula based on SCr of 2.31 mg/dL (H)).  I/O last 3 completed shifts:  In: 980 [P.O.:720; IV Piggyback:260]  Out: 1800 [Urine:1800]  Temp Readings from Last 3 Encounters:   07/13/19 98.6 °F (37 °C) (Oral)   06/17/19 98 °F (36.7 °C)   04/29/19 98.8 °F (37.1 °C) (Oral)         Assessment/Plan    Will start patient on Protonix 8 mg/hr drip.     Pharmacy will discontinue the Pharmacy to Dose consult at this time. Renal function will continue to be monitored and dosing adjustments will be made by pharmacy based on renal function if necessary      Rafael Wills [facility]

## 2019-07-14 NOTE — PROGRESS NOTES
"    Patient Name: Renuka Patel  :1940  79 y.o.      Patient Care Team:  Pa Rodriguez MD as PCP - General (Internal Medicine)  Code, Manuel GOMEZ II, MD as Consulting Physician (Hematology and Oncology)    Chief Complaint: follow up heart failure    Interval History: lost 6 lbs overnight though UOP is not that impressive.  She is feeling better but still feels very bloated in the abdominal area. She is mildly short of breath at rest.        Objective   Vital Signs  Temp:  [97.4 °F (36.3 °C)-98.6 °F (37 °C)] 97.6 °F (36.4 °C)  Heart Rate:  [69-84] 70  Resp:  [16-21] 18  BP: ()/(26-68) 70/46  FiO2 (%):  [40 %-100 %] 40 %    Intake/Output Summary (Last 24 hours) at 2019 1134  Last data filed at 2019 0824  Gross per 24 hour   Intake 930 ml   Output 500 ml   Net 430 ml     Flowsheet Rows      First Filed Value   Admission Height  162.6 cm (64\") Documented at 2019 0907   Admission Weight  112 kg (246 lb 6.4 oz) Documented at 07/10/2019 1704          Physical Exam:   General Appearance:    Alert, cooperative, in no acute distress   Lungs:     Clear to auscultation.  Normal respiratory effort and rate.      Heart:    Regular rhythm and normal rate, normal S1 and S2, no murmurs, gallops or rubs.     Chest Wall:    No abnormalities observed   Abdomen:     Soft, nontender, positive bowel sounds.     Extremities:   no cyanosis, clubbing or edema.  No marked joint deformities.  Adequate musculoskeletal strength.       Results Review:    Results from last 7 days   Lab Units 19  0339   SODIUM mmol/L 144   POTASSIUM mmol/L 6.6*   CHLORIDE mmol/L 99   CO2 mmol/L 14.7*   BUN mg/dL 95*   CREATININE mg/dL 3.16*   GLUCOSE mg/dL 101*   CALCIUM mg/dL 7.2*     Results from last 7 days   Lab Units 19  1623 19  0526 07/10/19  1755   CK TOTAL U/L 32  --   --    TROPONIN T ng/mL  --  0.015 0.015     Results from last 7 days   Lab Units 19  0339   WBC 10*3/mm3 14.37*   HEMOGLOBIN g/dL 8.2* "   HEMATOCRIT % 28.7*   PLATELETS 10*3/mm3 156     Results from last 7 days   Lab Units 07/14/19  0339   INR  2.80*   APTT seconds 68.0*     Results from last 7 days   Lab Units 07/10/19  1755   MAGNESIUM mg/dL 2.4     Results from last 7 days   Lab Units 07/11/19  0526   CHOLESTEROL mg/dL 72   TRIGLYCERIDES mg/dL 146   HDL CHOL mg/dL 29*   LDL CHOL mg/dL 14               Medication Review:              Assessment/Plan   1. Acute on chronic diastolic heart failure -better diuretics per renal  2. MARCIN on CKD - appreciate nephrology's assistance.     3.  Atrial Fibrillation: Previously on dabigatran, but currently held with concerns of her renal function.    9. SSS s/p [facility] PPM. She is currently paced, underlying AF  Check EKG BNP BMP and troponin in the morning    Ambreen Zhao MD    07/14/19  11:34 AM

## 2019-07-14 NOTE — PROGRESS NOTES
Patient seen and examined this morning.  Chart reviewed and ongoing events noted.  Acute events overnight noted.  Patient with multiple medical problems with worsening life-threatening renal failure and hyperkalemia and respiratory failure.  Ongoing GI bleed and family had discussion with multiple specialists as well as myself and would like to not continue with any aggressive care at this point.  They understand that she might not make any significant improvement and they would not want her to be kept alive on life support.  I have attempted a spontaneous breathing trial at bedside and patient does not seem to be doing well enough to be extubated.  Family was wishing to see if they can talk to her before being given medications for comfort.  She does not seem to be tolerating it.  We will hence go ahead and start the palliative care process and extubate once she appears to be comfortable.  Goal is to make sure she does not have any gasping or pain. Will start palliative care order set.     D/w son and daughter @ bedside. D/w other consultants.     CC - 48mins

## 2019-07-14 NOTE — CODE DOCUMENTATION
Call place to Dr. Whiting, pt is not responsvie enough to protect her airway, MD in route to intubate patient then transfer to CCU.

## 2019-07-14 NOTE — CONSULTS
Group: [facility]         CONSULT NOTE    Patient Identification:  Renuka Patel  79 y.o.  female  1940  [id number]            Requesting physician: Dr. De    Reason for Consultation: Hemorrhagic shock    CC: Bloody bowel movement, hypotension, unresponsiveness    History of Present Illness:  79-year-old female who had been hospitalized for 4 days in the hospital on telemetry floor suddenly became unresponsive.  She still had a pulse but she was having increased work of breathing and became unconscious.  Her oxygen saturations dropped and required 100% FiO2 via bag mask ventilation during rapid response in order to improve her oxygen saturations.  She became cold and clammy, hypotensive with systolic of 78 mmHg.  She had several large bloody foul-smelling bowel movements during the rapid response.  I was called immediately to the bedside in the CCU to intubate the patient, place a central line for vascular access and to evaluate the patient for consultation.  She is unable to give any history.    History is obtained from a review of H&P dictated by Dr. De on July 10, 2019, and consultation notes dictated by Dr. Rommel Duncan on July 11 and Dr. Rios on July 10.  The patient was admitted with lower extremity swelling and weakness.  She was diagnosed with acute kidney injury on chronic kidney disease and decompensated heart failure with anasarca.  She also has a right leg wound and chronic anemia.    I reviewed all records including discharge summary dictated by Dr. Varghese Raymond on August 27, 2016.  At that time the patient had acute systolic and diastolic heart failure.  She has a history of colon cancer status post resection in 2015 by Dr. Bernal.  She has type 2 diabetes with stage III kidney disease, obesity, obstructive sleep apnea, mitral regurgitation and morbid obesity.      Review of Systems   Unable to perform ROS: Intubated   No family at bedside for additional history    Past  Medical History:  Past Medical History:   Diagnosis Date   • Abdominal aortic aneurysm (CMS/HCC)     3.6 cm infrarenal January 2014   • Acute bronchitis    • Anemia     Secondary to stage 3 chronic kidney disease and hx of iron deficiency; followed by Dr. Manuel Hart   • Anxiety    • Atrial fibrillation (CMS/HCC) 12/2010    Previous cardioversion; status post AV node ablation by Dr. Bear Rodrigues 5/2012   • Back pain    • Breast cancer (CMS/HCC)     Stage I, status post lumpectomy; history of radiation   • CAD (coronary artery disease)    • Cardiomyopathy (CMS/HCC)    • CHF (congestive heart failure) (CMS/HCC)     Acute on chronic systolic and diastolic; followed by Dr. Rhiannon Rios   • Chronic kidney disease     Followed by Dr. Anna Gerardo    • Colon cancer (CMS/HCC)     Stage I, resected 01/27/2014   • COPD (chronic obstructive pulmonary disease) (CMS/HCC)     Oxygen dependent; followed by Dr. Reza   • Esophageal reflux    • Hernia of anterior abdominal wall 3/11/2016   • Hyperlipidemia    • Hypertension    • Joint pain     IN THE RIGHT KNEE   • Mass of adrenal gland (CMS/HCC)     lesion solitary, CT thereof   • Morbid obesity (CMS/HCC)    • Non-rheumatic mitral regurgitation    • NSTEMI (non-ST elevated myocardial infarction) (CMS/HCC) 11/2013   • Oral thrush    • HAYDEN on CPAP     Followed by Dr. Reza   • Osteoarthritis    • Pneumonia     onset date: 01 Mar 2011, Severe pneumonia w mechanical ventilation, treated at [facility]. Patricia Rosenbergth   • Shortness of breath    • Sick sinus syndrome (CMS/HCC)     Severe bradycardia; status post pacemaker implantation January 2011   • Strong family history of breast cancer    • Type 2 diabetes mellitus (CMS/HCC)    • Ventral hernia    • Vitamin D deficiency        Past Surgical History:  Past Surgical History:   Procedure Laterality Date   • AV NODE ABLATION  05/2012    Dr. Bear Rodrigues   • BACK SURGERY     • BREAST BIOPSY     • BREAST LUMPECTOMY     • BREAST SURGERY      • CARDIAC PACEMAKER PLACEMENT     • COLON SURGERY  01/27/2014    resection for colon cancer   • COLONOSCOPY  07/10/2015    normal ileum, liopmatous ileocecal valve, diverticulosis throughout entire colon, colonic ulceration, IH, mixed TA/hyperplastic polyps   • CORONARY ANGIOPLASTY WITH STENT PLACEMENT  11/2013    2.25×15 mm drug-eluting stent to the second diagonal of the LAD, 2.75×18 mm Xience drug-eluting stent to the mid LAD, and 3.5×23 mm Xience drug-eluting stent to the proximal LAD   • ENDOSCOPY  07/10/2015    erythematous mucosa in stomach, no gross lesions in duodenum, proximal white plaques   • HAND SURGERY     • HYSTERECTOMY     • TIBIA FRACTURE SURGERY      left   • TOE SURGERY      bilat all toenails removed x3 r/t fungus        Home Meds:  Medications Prior to Admission   Medication Sig Dispense Refill Last Dose   • albuterol (PROVENTIL) (2.5 MG/3ML) 0.083% nebulizer solution INHALE THE CONTENTS OF ONE VIAL (3ML) EVERY 4 HOURS AS NEEDED  5 7/10/2019 at Unknown time   • allopurinol (ZYLOPRIM) 300 MG tablet Take 1 tablet by mouth Daily. 90 tablet 3 7/10/2019 at Unknown time   • atorvastatin (LIPITOR) 20 MG tablet Take 1 tablet by mouth Daily. 90 tablet 1 7/9/2019 at Unknown time   • carvedilol (COREG) 25 MG tablet TAKE ONE TABLET BY MOUTH IN THE MORNING, TAKE 1/2 TABLET AT NOON AND AT BEDTIME 180 tablet 1 7/10/2019 at Unknown time   • dabigatran etexilate (PRADAXA) 150 MG capsu Take 1 capsule by mouth Every 12 (Twelve) Hours. 48 capsule 0 7/10/2019 at Unknown time   • diphenhydrAMINE (BENADRYL) 25 MG tablet Take 25 mg by mouth Every 6 (Six) Hours As Needed for Itching.   Past Week at Unknown time   • Fluticasone-Umeclidin-Vilant (TRELEGY ELLIPTA IN) Inhale Daily.   7/10/2019 at Unknown time   • furosemide (LASIX) 40 MG tablet Take 40 mg by mouth 3 (Three) Times a Day. One tab in the morning, second tab at 1100, third tab at 1500      • GLOBAL EASE INJECT PEN NEEDLES 32G X 4 MM misc USE AS DIRECTED 100  each 0 7/10/2019 at Unknown time   • levocetirizine (XYZAL) 5 MG tablet Take 5 mg by mouth Every Evening.   7/10/2019 at Unknown time   • Melatonin 10 MG capsule Take 1 capsule by mouth At Night As Needed.   Past Week at Unknown time   • omeprazole (priLOSEC) 40 MG capsule Take 40 mg by mouth Daily.   7/10/2019 at Unknown time   • potassium chloride (K-DUR,KLOR-CON) 20 MEQ CR tablet Take 1 tablet by mouth Daily. 90 tablet 3 7/10/2019 at Unknown time   • TRADJENTA 5 MG tablet tablet Take 1 tablet by mouth Daily. 90 tablet 1 7/10/2019 at Unknown time   • traZODone (DESYREL) 100 MG tablet Take 100 mg by mouth At Night As Needed for Sleep.      • fluticasone (FLONASE) 50 MCG/ACT nasal spray 2 sprays into each nostril Daily.   Unknown at Unknown time   • Insulin Glargine 300 UNIT/ML solution pen-injector Inject 55 Units under the skin into the appropriate area as directed Daily. (Patient taking differently: Inject 45 Units under the skin into the appropriate area as directed Daily.) 3 pen 5 Taking   • ipratropium-albuterol (DUO-NEB) 0.5-2.5 mg/mL nebulizer INHALE THE contents OF ONE vial using nebulizer FOUR TIMES DAILY AS NEEDED  5 Taking   • nitroglycerin (NITROSTAT) 0.4 MG SL tablet PLACE ONE TABLET UNDER TONGUE FOR CHEST PAIN.  MAY REPEAT EVERY 5 MINUTES FOR 3  DOSES 25 tablet 0 Unknown at Unknown time       Allergies:  Allergies   Allergen Reactions   • Codeine Itching     Edema   • Hydralazine Hcl      BP drops.   • Lisinopril Cough     Edema       Social History:   Social History     Socioeconomic History   • Marital status:      Spouse name: Not on file   • Number of children: Not on file   • Years of education: Not on file   • Highest education level: Not on file   Occupational History   • Occupation: Retired   Tobacco Use   • Smoking status: Former Smoker     Packs/day: 1.00     Years: 25.00     Pack years: 25.00     Last attempt to quit: 2004     Years since quitting: 15.5   • Smokeless tobacco: Never  "Used   • Tobacco comment: caffeine use   Substance and Sexual Activity   • Alcohol use: Yes     Comment: Rare   • Drug use: No   • Sexual activity: Defer       Family History:  Family History   Problem Relation Age of Onset   • Breast cancer Mother 60   • Breast cancer Sister 50   • Arrhythmia Sister    • Hypertension Sister    • Brain cancer Father 38   • Breast cancer Sister 40   • Heart attack Brother    • Hypertension Brother    • Diabetes Son    • Hypertension Son    • Heart disease Maternal Grandmother        Physical Exam:  BP (!) 87/41   Pulse 70   Temp 98.6 °F (37 °C) (Oral)   Resp 17   Ht 162.6 cm (64\")   Wt 119 kg (263 lb)   SpO2 100%   BMI 45.14 kg/m²  Body mass index is 45.14 kg/m². 100% 119 kg (263 lb)  Physical Exam   Constitutional:   Morbidly obese, intubated, unresponsive   HENT:   Nose: Nose normal.   Oral exam shows endotracheal tube in good position   Eyes: Conjunctivae are normal. Pupils are equal, round, and reactive to light.   Conjunctiva are pink and moist   Neck: No JVD present. No tracheal deviation present. No thyromegaly present.   Cardiovascular: Normal rate, regular rhythm and normal heart sounds.   No murmur heard.  She does have 2+ leg edema   Pulmonary/Chest:   Very shallow and slow breathing.  No use of accessory muscles, no dullness to percussion.  No wheezing   Abdominal: Soft.   Obese, large body habitus makes it difficult to examine the abdomen    Musculoskeletal: She exhibits no deformity.   Neurological:   Cannot be examined.  Patient is unresponsive, intubated   Skin: Skin is warm. No rash noted.   No palpable nodules   Psychiatric:   Cannot be assessed, the patient is intubated and unresponsive       LABS:  Lab Results   Component Value Date    CALCIUM 8.4 (L) 07/13/2019    PHOS 6.0 (H) 07/13/2019     Results from last 7 days   Lab Units 07/13/19  1815 07/13/19  0815 07/13/19  0533 07/12/19  0604 07/11/19  0526 07/10/19  1755   MAGNESIUM mg/dL  --   --   --   --   " --  2.4   SODIUM mmol/L  --  141  --  138 139 142   POTASSIUM mmol/L 6.0* 6.0*  --  4.7 4.8 4.9   CHLORIDE mmol/L  --  99  --  97* 97* 99   CO2 mmol/L  --  19.7*  --  24.2 24.3 22.8   BUN mg/dL  --  87*  --  79* 71* 69*   CREATININE mg/dL  --  2.31*  --  2.12* 1.95* 1.97*   GLUCOSE mg/dL  --  76  --  52* 71 118*   CALCIUM mg/dL  --  8.4*  --  8.4* 8.3* 8.1*   WBC 10*3/mm3  --   --  12.99* 16.64* 16.66* 17.60*   HEMOGLOBIN g/dL  --   --  10.3* 9.9* 9.4* 10.0*   PLATELETS 10*3/mm3  --   --  135* 128* 134* 128*   ALT (SGPT) U/L  --   --   --  26 35* 37*   AST (SGOT) U/L  --   --   --  122* 119* 121*   PROBNP pg/mL  --   --   --   --  5,053.0*  --      Lab Results   Component Value Date    CKTOTAL 32 07/11/2019    TROPONINT 0.015 07/11/2019     Results from last 7 days   Lab Units 07/11/19  1623 07/11/19  0526 07/10/19  1755   CK TOTAL U/L 32  --   --    TROPONIN T ng/mL  --  0.015 0.015     Results from last 7 days   Lab Units 07/10/19  1755   BLOODCX  No growth at 3 days  No growth at 3 days         Results from last 7 days   Lab Units 07/14/19  0216   PH, ARTERIAL pH units 7.012*   PCO2, ARTERIAL mm Hg 69.4*   PO2 ART mm Hg 76.7*   FLOW RATE lpm 4   MODALITY  Cannula   O2 SATURATION CALC % 85.7*     Results from last 7 days   Lab Units 07/10/19  2128   ADENOVIRUS DETECTION BY PCR  Not Detected   CORONAVIRUS 229E  Not Detected   CORONAVIRUS HKU1  Not Detected   CORONAVIRUS NL63  Not Detected   CORONAVIRUS OC43  Not Detected   HUMAN METAPNEUMOVIRUS  Not Detected   HUMAN RHINOVIRUS/ENTEROVIRUS  Not Detected   INFLUENZA B PCR  Not Detected   PARAINFLUENZA 1  Not Detected   PARAINFLUENZA VIRUS 2  Not Detected   PARAINFLUENZA VIRUS 3  Not Detected   PARAINFLUENZA VIRUS 4  Not Detected   BORDETELLA PERTUSSIS PCR  Not Detected   CHLAMYDOPHILA PNEUMONIAE PCR  Not Detected   MYCOPLAMA PNEUMO PCR  Not Detected   INFLUENZA A PCR  Not Detected   INFLUENZA A H3  Not Detected   INFLUENZA A H1  Not Detected   RSV, PCR  Not Detected          Results from last 7 days   Lab Units 07/10/19  1755   BLOODCX  No growth at 3 days  No growth at 3 days     Lab Results   Component Value Date    TSH 3.310 07/11/2019     Estimated Creatinine Clearance: 24.7 mL/min (A) (by C-G formula based on SCr of 2.31 mg/dL (H)).  Results from last 7 days   Lab Units 07/10/19  2128   NITRITE UA  Negative   WBC UA /HPF 3-5*   BACTERIA UA /HPF None Seen   SQUAM EPITHEL UA /HPF 0-2        Imaging: I personally visualized the images of a portable chest x-ray.  Shows endotracheal tube in good position.  Right IJ central venous catheter is in proper position, no pneumothorax.  There is cardiomegaly.  There is left pleural effusion.  There are no pulmonary infiltrates.      Assessment:  Acute blood loss anemia  Hemorrhagic shock  GI bleed  Acute hypercapnic and hypoxic respiratory failure  Hyperkalemia  Acute kidney injury and chronic kidney disease  Elevated liver enzymes  Morbid obesity  Acute on chronic combined heart failure      Recommendations:  Patient was transferred immediately to the intensive care unit.  She was immediately intubated.  We will adjust mechanical ventilation.  GI consult for endoscopy, upper and lower endoscopic evaluation for determination of bleeding site.  Start IV Protonix drip.  Order stat coagulation times, PT/INR and PTT.  Order type and cross 2 units of packed red cells on standby.  Draw hemoglobin level at this time since this morning's hemoglobin level was 10 g/dL.  Send patient down for CT scan of the abdomen and pelvis once she is more stable.  Patient started vomiting while on the ventilator, therefore NG tube is being placed at this time.  Stop IV Bumex drip due to persistent hypotension.  Give bolus of 1 L of normal saline over 1 hour, and subsequently start Levophed drip to achieve mean arterial blood pressure greater than 65 mmHg.    Total critical care time 65 minutes, excluding any separately billable procedure time          Emil  ALEXX Whiting MD  7/14/2019  3:30 AM      Much of this encounter note is an electronic transcription/translation of spoken language to printed text using Dragon Software.

## 2019-07-14 NOTE — NURSING NOTE
Rapid response called at 0153.  Spoke to Charissa Devin (daughter) to inform of change in condition, pending intubation, and transfer to CCU.

## 2019-07-14 NOTE — NURSING NOTE
Palliative care set initiated.     CHAIM called. Released. Call at time of death.    Extubated at 1104. All life sustaining medications d/c'ed.     Pt reports no discomfort at this time and appears to not be in any acute distress.

## 2019-07-14 NOTE — CONSULTS
"[facility], [facility]  Initial Inpatient Consult Note    Referring Provider: Dr. De    Reason for Consultation: GI bleeding: vomiting blood and rectal bleeding    Subjective     History of present illness:    79 y.o. female with a h/o colon cancer in the past, COPD, congestive heart failure, obesity, coronary artery disease, chronic atrial fibrillation, diabetes mellitus, hypertension, obstructive sleep apnea, breast cancer,  iron deficiency anemia also have chronic kidney disease directly admitted the hospital 7/10/19 from assisted living with leg swelling generalized weakness and nausea. She was felt to be fluid overloaded and has been diuresed. Last night she had bloody BM's, hypotension and unresponsiveness requiring her to be coded, intubated and transferred to CCU. She had a CT abd/pelvis that showed what looks like metastatic disease to the liver of unknown cause. According to her son she has never had GI bleeding. She was on Pradaxa prior to admission. He denies that she has been on NSAIDs. Rare ETOH though used to drink more. Her weight has been stable. The family is deciding now if she should be a \"Comfort Care Only\".          I last saw the patient in the office in 4/19:  Assessment:  1.personal history of colon cancer.  January 2014 Dr. Nathan Bernal resected a distal transverse colon/descending colon pT1, pN0 colon cancer.  2.Worsening anemia. She is heme negative today.      Recommendations:  1. We discussed the pros and cons of doing a colonoscopy on this 79 yo with so many medical problems. Could she tolerate a colonoscopy prep. Would Anesthesia even put her to sleep as she requires O2 at 3-4 LPM continuously? She would rather not have a colonsocopy. I discussed with Dr. Hart.   2) I want her to think about colonoscopy or not.     She never did get back in touch with me about doing a colonoscopy or not.     Past Medical History:  Past Medical History:   Diagnosis Date   • Abdominal " aortic aneurysm (CMS/HCC)     3.6 cm infrarenal January 2014   • Acute bronchitis    • Anemia     Secondary to stage 3 chronic kidney disease and hx of iron deficiency; followed by Dr. Manuel Hart   • Anxiety    • Atrial fibrillation (CMS/HCC) 12/2010    Previous cardioversion; status post AV node ablation by Dr. Bear Rodrigues 5/2012   • Back pain    • Breast cancer (CMS/HCC)     Stage I, status post lumpectomy; history of radiation   • CAD (coronary artery disease)    • Cardiomyopathy (CMS/HCC)    • CHF (congestive heart failure) (CMS/HCC)     Acute on chronic systolic and diastolic; followed by Dr. Rhiannon Rios   • Chronic kidney disease     Followed by Dr. Anna Gerardo    • Colon cancer (CMS/HCC)     Stage I, resected 01/27/2014   • COPD (chronic obstructive pulmonary disease) (CMS/HCC)     Oxygen dependent; followed by Dr. Reza   • Esophageal reflux    • Hernia of anterior abdominal wall 3/11/2016   • Hyperlipidemia    • Hypertension    • Joint pain     IN THE RIGHT KNEE   • Mass of adrenal gland (CMS/HCC)     lesion solitary, CT thereof   • Morbid obesity (CMS/HCC)    • Non-rheumatic mitral regurgitation    • NSTEMI (non-ST elevated myocardial infarction) (CMS/HCC) 11/2013   • Oral thrush    • HAYDEN on CPAP     Followed by Dr. Reza   • Osteoarthritis    • Pneumonia     onset date: 01 Mar 2011, Severe pneumonia w mechanical ventilation, treated at [facility]. Patricia Rosenbergth   • Shortness of breath    • Sick sinus syndrome (CMS/HCC)     Severe bradycardia; status post pacemaker implantation January 2011   • Strong family history of breast cancer    • Type 2 diabetes mellitus (CMS/HCC)    • Ventral hernia    • Vitamin D deficiency      Past Surgical History:  Past Surgical History:   Procedure Laterality Date   • AV NODE ABLATION  05/2012    Dr. Bear Rodrigues   • BACK SURGERY     • BREAST BIOPSY     • BREAST LUMPECTOMY     • BREAST SURGERY     • CARDIAC PACEMAKER PLACEMENT     • COLON SURGERY  01/27/2014     resection for colon cancer   • COLONOSCOPY  07/10/2015    normal ileum, liopmatous ileocecal valve, diverticulosis throughout entire colon, colonic ulceration, IH, mixed TA/hyperplastic polyps   • CORONARY ANGIOPLASTY WITH STENT PLACEMENT  11/2013    2.25×15 mm drug-eluting stent to the second diagonal of the LAD, 2.75×18 mm Xience drug-eluting stent to the mid LAD, and 3.5×23 mm Xience drug-eluting stent to the proximal LAD   • ENDOSCOPY  07/10/2015    erythematous mucosa in stomach, no gross lesions in duodenum, proximal white plaques   • HAND SURGERY     • HYSTERECTOMY     • TIBIA FRACTURE SURGERY      left   • TOE SURGERY      bilat all toenails removed x3 r/t fungus      Social History:   Social History     Tobacco Use   • Smoking status: Former Smoker     Packs/day: 1.00     Years: 25.00     Pack years: 25.00     Last attempt to quit: 2004     Years since quitting: 15.5   • Smokeless tobacco: Never Used   • Tobacco comment: caffeine use   Substance Use Topics   • Alcohol use: Yes     Comment: Rare      Family History:  Family History   Problem Relation Age of Onset   • Breast cancer Mother 60   • Breast cancer Sister 50   • Arrhythmia Sister    • Hypertension Sister    • Brain cancer Father 38   • Breast cancer Sister 40   • Heart attack Brother    • Hypertension Brother    • Diabetes Son    • Hypertension Son    • Heart disease Maternal Grandmother        Home Meds:  Medications Prior to Admission   Medication Sig Dispense Refill Last Dose   • albuterol (PROVENTIL) (2.5 MG/3ML) 0.083% nebulizer solution INHALE THE CONTENTS OF ONE VIAL (3ML) EVERY 4 HOURS AS NEEDED  5 7/10/2019 at Unknown time   • allopurinol (ZYLOPRIM) 300 MG tablet Take 1 tablet by mouth Daily. 90 tablet 3 7/10/2019 at Unknown time   • atorvastatin (LIPITOR) 20 MG tablet Take 1 tablet by mouth Daily. 90 tablet 1 7/9/2019 at Unknown time   • carvedilol (COREG) 25 MG tablet TAKE ONE TABLET BY MOUTH IN THE MORNING, TAKE 1/2 TABLET AT NOON AND  AT BEDTIME 180 tablet 1 7/10/2019 at Unknown time   • dabigatran etexilate (PRADAXA) 150 MG capsu Take 1 capsule by mouth Every 12 (Twelve) Hours. 48 capsule 0 7/10/2019 at Unknown time   • diphenhydrAMINE (BENADRYL) 25 MG tablet Take 25 mg by mouth Every 6 (Six) Hours As Needed for Itching.   Past Week at Unknown time   • Fluticasone-Umeclidin-Vilant (TRELEGY ELLIPTA IN) Inhale Daily.   7/10/2019 at Unknown time   • furosemide (LASIX) 40 MG tablet Take 40 mg by mouth 3 (Three) Times a Day. One tab in the morning, second tab at 1100, third tab at 1500      • GLOBAL EASE INJECT PEN NEEDLES 32G X 4 MM misc USE AS DIRECTED 100 each 0 7/10/2019 at Unknown time   • levocetirizine (XYZAL) 5 MG tablet Take 5 mg by mouth Every Evening.   7/10/2019 at Unknown time   • Melatonin 10 MG capsule Take 1 capsule by mouth At Night As Needed.   Past Week at Unknown time   • omeprazole (priLOSEC) 40 MG capsule Take 40 mg by mouth Daily.   7/10/2019 at Unknown time   • potassium chloride (K-DUR,KLOR-CON) 20 MEQ CR tablet Take 1 tablet by mouth Daily. 90 tablet 3 7/10/2019 at Unknown time   • TRADJENTA 5 MG tablet tablet Take 1 tablet by mouth Daily. 90 tablet 1 7/10/2019 at Unknown time   • traZODone (DESYREL) 100 MG tablet Take 100 mg by mouth At Night As Needed for Sleep.      • fluticasone (FLONASE) 50 MCG/ACT nasal spray 2 sprays into each nostril Daily.   Unknown at Unknown time   • Insulin Glargine 300 UNIT/ML solution pen-injector Inject 55 Units under the skin into the appropriate area as directed Daily. (Patient taking differently: Inject 45 Units under the skin into the appropriate area as directed Daily.) 3 pen 5 Taking   • ipratropium-albuterol (DUO-NEB) 0.5-2.5 mg/mL nebulizer INHALE THE contents OF ONE vial using nebulizer FOUR TIMES DAILY AS NEEDED  5 Taking   • nitroglycerin (NITROSTAT) 0.4 MG SL tablet PLACE ONE TABLET UNDER TONGUE FOR CHEST PAIN.  MAY REPEAT EVERY 5 MINUTES FOR 3  DOSES 25 tablet 0 Unknown at Unknown  time     Current Meds:     albuterol sulfate HFA 6 puff Inhalation Q4H - RT   allopurinol 100 mg Oral Daily   ceFAZolin 1 g Intravenous Q12H   guaiFENesin 600 mg Oral Q12H   insulin lispro 0-7 Units Subcutaneous 4x Daily With Meals & Nightly   Fluticasone-Umeclidin-Vilant 1 each Inhalation Daily - RT     Allergies:  Allergies   Allergen Reactions   • Codeine Itching     Edema   • Hydralazine Hcl      BP drops.   • Lisinopril Cough     Edema     Review of Systems  Review of systems could not be obtained due to   patient intubated.     Objective     Vital Signs  Temp:  [97.4 °F (36.3 °C)-98.6 °F (37 °C)] 97.6 °F (36.4 °C)  Heart Rate:  [69-84] 70  Resp:  [16-21] 20  BP: ()/(26-68) 105/56  FiO2 (%):  [40 %-100 %] 40 %  Physical Exam:  General Appearance:    INtubated, awake   Head:    Normocephalic, without obvious abnormality, atraumatic   Eyes:            Lids and lashes normal, conjunctivae and sclerae normal, no   icterus   Throat:   No oral lesions, no thrush, oral mucosa moist   Neck:   No adenopathy, supple, trachea midline, no thyromegaly, no   carotid bruit, no JVD   Lungs:     Clear to auscultation,respirations regular, even and                   unlabored    Heart:    Regular rhythm and normal rate, normal S1 and S2, no            murmur, no gallop, no rub, no click   Chest Wall:    No abnormalities observed   Abdomen:     Normal bowel sounds, no masses, no organomegaly, soft        non-tender, Obese, no guarding, no rebound                 tenderness   Rectal:     Deferred   Extremities:   2-3+ pitting pretibial edema, no cyanosis, no redness   Skin:   No bleeding, bruising or rash   Lymph nodes:   No palpable adenopathy   Psychiatric:  INtubated, awake.   Results Review:   I reviewed the patient's new clinical results.    Results from last 7 days   Lab Units 07/14/19  0339 07/13/19  0533 07/12/19  0604   WBC 10*3/mm3 14.37* 12.99* 16.64*   HEMOGLOBIN g/dL 8.2* 10.3* 9.9*   HEMATOCRIT % 28.7* 35.0  33.1*   PLATELETS 10*3/mm3 156 135* 128*     Results from last 7 days   Lab Units 07/14/19  0339 07/13/19  1815 07/13/19  0815 07/12/19  0604 07/11/19  0526   SODIUM mmol/L 144  --  141 138 139   POTASSIUM mmol/L 6.6* 6.0* 6.0* 4.7 4.8   CHLORIDE mmol/L 99  --  99 97* 97*   CO2 mmol/L 14.7*  --  19.7* 24.2 24.3   BUN mg/dL 95*  --  87* 79* 71*   CREATININE mg/dL 3.16*  --  2.31* 2.12* 1.95*   CALCIUM mg/dL 7.2*  --  8.4* 8.4* 8.3*   BILIRUBIN mg/dL 1.1  --   --  1.3* 1.2   ALK PHOS U/L 868*  --   --  1,128* 1,040*   ALT (SGPT) U/L 19  --   --  26 35*   AST (SGOT) U/L 257*  --   --  122* 119*   GLUCOSE mg/dL 101*  --  76 52* 71     Results from last 7 days   Lab Units 07/14/19  0339   INR  2.80*     Lab Results   Lab Value Date/Time    LIPASE 25 02/06/2014 0457       Radiology:  XR Chest 1 View   Final Result   Life support line additions as above, no pneumothorax           This report was finalized on 7/14/2019 3:45 AM by Genaro Rocha M.D.          CT Abdomen Pelvis Without Contrast   Final Result      US Gallbladder   Final Result      US Renal Bilateral   Final Result      XR Chest 1 View   Final Result          Assessment/Plan   Patient Active Problem List   Diagnosis   • Abdominal aortic aneurysm (CMS/HCC)   • Chronic coronary artery disease   • Atrial fibrillation (CMS/HCC)   • Cardiomyopathy (CMS/HCC)   • Cancer of transverse colon (CMS/HCC)   • Acute on chronic combined systolic and diastolic CHF (congestive heart failure) (CMS/HCC)   • Type 2 diabetes with stage 3 chronic kidney disease GFR 30-59 (CMS/HCC)   • Shortness of breath   • Hyperlipidemia   • Hypertension   • Hernia of anterior abdominal wall   • Vitamin D deficiency   • Iron deficiency anemia refractory to iron therapy   • Anemia due to stage 3 chronic kidney disease treated with erythropoietin (CMS/HCC)   • HAYDEN (obstructive sleep apnea)   • Morbid obesity with BMI of 40.0-44.9, adult (CMS/[facility])   • COPD (chronic obstructive pulmonary disease)  (CMS/HCC)   • Mitral regurgitation, moderate   • Uncontrolled type 2 diabetes mellitus with complication, with long-term current use of insulin (CMS/HCC)   • H/O cardiac pacemaker   • Stage 3 chronic kidney disease (CMS/HCC)   • Malabsorption of iron   • Generalized weakness       I discussed the patients findings and my recommendations with patient, family and nursing staff.    Assesssment:  1) h/o recent GI bleeding with vomiting blood and rectal bleeding in association with being coded last night with being intubated. She is now in the CCU and she is reportedly asking to be a Comfort Care only patient.   2) Personal h/o colon cancer. In 1/14 Dr. Nathan Bernal resected a distal transverse colon/descending colon pT1, pN0 colon cancer. CT abd now suggests that she has metastatic disease to the liver. Is this mets from liver cancer?  3) 80 yo female with multiple medical problems: COPD, congestive heart failure, obesity, coronary artery disease, chronic atrial fibrillation, diabetes mellitus, hypertension, obstructive sleep apnea, breast cancer,  iron deficiency anemia also have chronic kidney disease     Recommendations:  1) The family does not want invasive procedures and they want her to be Palliative Care. So be it. GI will sign off and see prn.     Adrien Gautam MD

## 2019-07-14 NOTE — PROGRESS NOTES
"Daily progress note    Chief complaint  Events noted  Awake and alert  Family at bedside    History of present illness  79-year-old white female with very complex past medical history including COPD congestive heart failure coronary artery disease chronic atrial fibrillation diabetes mellitus hypertension obstructive sleep apnea breast cancer colon cancer iron deficiency anemia also have chronic kidney disease directly admitted to our service from assisted living with leg swelling generalized weakness and nausea.  Patient evaluated by primary care doctor and found to be in acute kidney injury on top of chronic kidney disease.  Patient also have elevated liver enzymes.  Patient denies any fever chills chest pain abdominal pain vomiting or diarrhea.  Patient complained of right leg redness and denies any trauma injury and fall but work-up revealed cellulitis.    Review of Systems   Unremarkable except for generalized weakness and swelling all over    Exam:   Blood pressure (!) 60/31, pulse 70, temperature 97.6 °F (36.4 °C), temperature source Oral, resp. rate 18, height 162.6 cm (64\"), weight 124 kg (274 lb 7.6 oz), SpO2 96 %.    Awake and alert  Lungs decreased breath sound bilaterally  Heart S1-S2  Abdomen distended diffuse tenderness bowel sounds positive  Extremities 2+ edema  Neuro moves all 4 extremities    Data Review:   Lab Results (last 24 hours)     Procedure Component Value Units Date/Time    POC Glucose Once [[id number]]  (Normal) Collected:  07/14/19 0729    Specimen:  Blood Updated:  07/14/19 0809     Glucose 123 mg/dL     Lactic Acid, Reflex Timer (This will reflex a repeat order 3-3:15 hours after ordered.) [[id number]] Collected:  07/14/19 0341    Specimen:  Blood Updated:  07/14/19 0715     Extra Tube Hold for add-ons.     Comment: Auto resulted.       Phosphorus [[id number]]  (Abnormal) Collected:  07/14/19 0339    Specimen:  Blood Updated:  07/14/19 0530     Phosphorus 8.7 mg/dL     Procalcitonin " "[[id number]]  (Abnormal) Collected:  07/14/19 0341    Specimen:  Blood Updated:  07/14/19 0434     Procalcitonin 3.20 ng/mL     Narrative:       As a Marker for Sepsis (Non-Neonates):   1. <0.5 ng/mL represents a low risk of severe sepsis and/or septic shock.  1. >2 ng/mL represents a high risk of severe sepsis and/or septic shock.    As a Marker for Lower Respiratory Tract Infections that require antibiotic therapy:  PCT on Admission     Antibiotic Therapy             6-12 Hrs later  > 0.5                Strongly Recommended            >0.25 - <0.5         Recommended  0.1 - 0.25           Discouraged                   Remeasure/reassess PCT  <0.1                 Strongly Discouraged          Remeasure/reassess PCT      As 28 day mortality risk marker: \"Change in Procalcitonin Result\" (> 80 % or <=80 %) if Day 0 (or Day 1) and Day 4 values are available. Refer to http://www.Chargemasterpct-calculator.com/   Change in PCT <=80 %   A decrease of PCT levels below or equal to 80 % defines a positive change in PCT test result representing a higher risk for 28-day all-cause mortality of patients diagnosed with severe sepsis or septic shock.  Change in PCT > 80 %   A decrease of PCT levels of more than 80 % defines a negative change in PCT result representing a lower risk for 28-day all-cause mortality of patients diagnosed with severe sepsis or septic shock.                Comprehensive Metabolic Panel [[id number]]  (Abnormal) Collected:  07/14/19 0339    Specimen:  Blood Updated:  07/14/19 0426     Glucose 101 mg/dL      BUN 95 mg/dL      Creatinine 3.16 mg/dL      Sodium 144 mmol/L      Potassium 6.6 mmol/L      Chloride 99 mmol/L      CO2 14.7 mmol/L      Calcium 7.2 mg/dL      Total Protein 4.1 g/dL      Albumin 1.80 g/dL      ALT (SGPT) 19 U/L      AST (SGOT) 257 U/L      Alkaline Phosphatase 868 U/L      Total Bilirubin 1.1 mg/dL      eGFR Non African Amer 14 mL/min/1.73      Comment: <15 Indicative of kidney failure.    "     eGFR   Amer -- mL/min/1.73      Comment: <15 Indicative of kidney failure.        Globulin 2.3 gm/dL      A/G Ratio 0.8 g/dL      BUN/Creatinine Ratio 30.1     Anion Gap 30.3 mmol/L     Narrative:       GFR Normal >60  Chronic Kidney Disease <60  Kidney Failure <15    CBC & Differential [[id number]] Collected:  07/14/19 0339    Specimen:  Blood Updated:  07/14/19 0424    Narrative:       The following orders were created for panel order CBC & Differential.  Procedure                               Abnormality         Status                     ---------                               -----------         ------                     CBC Auto Differential[[id number]]        Abnormal            Final result                 Please view results for these tests on the individual orders.    CBC Auto Differential [[id number]]  (Abnormal) Collected:  07/14/19 0339    Specimen:  Blood Updated:  07/14/19 0424     WBC 14.37 10*3/mm3      RBC 2.71 10*6/mm3      Hemoglobin 8.2 g/dL      Hematocrit 28.7 %      .9 fL      MCH 30.3 pg      MCHC 28.6 g/dL      RDW 22.5 %      RDW-SD 84.0 fl      MPV 13.4 fL      Platelets 156 10*3/mm3     Manual Differential [[id number]]  (Abnormal) Collected:  07/14/19 0339    Specimen:  Blood Updated:  07/14/19 0424     Neutrophil % 92.0 %      Lymphocyte % 5.0 %      Monocyte % 2.0 %      Metamyelocyte % 1.0 %      Neutrophils Absolute 13.22 10*3/mm3      Lymphocytes Absolute 0.72 10*3/mm3      Monocytes Absolute 0.29 10*3/mm3      nRBC 1.0 /100 WBC      Anisocytosis Mod/2+     Macrocytes Mod/2+     Poikilocytes Slight/1+     Polychromasia Slight/1+     Smudge Cells Slight/1+     Platelet Morphology Normal    BNP [[id number]]  (Abnormal) Collected:  07/14/19 0339    Specimen:  Blood Updated:  07/14/19 0423     proBNP 9,889.0 pg/mL     Narrative:       Among patients with dyspnea, NT-proBNP is highly sensitive for the detection of acute congestive heart failure. In addition NT-proBNP of  <300 pg/ml effectively rules out acute congestive heart failure with 99% negative predictive value.    Protime-INR [[id number]]  (Abnormal) Collected:  07/14/19 0339    Specimen:  Blood Updated:  07/14/19 0408     Protime 29.2 Seconds      INR 2.80    aPTT [[id number]]  (Abnormal) Collected:  07/14/19 0339    Specimen:  Blood Updated:  07/14/19 0408     PTT 68.0 seconds     Lactic Acid, Plasma [[id number]]  (Abnormal) Collected:  07/14/19 0341    Specimen:  Blood Updated:  07/14/19 0408     Lactate 10.7 mmol/L     POC Glucose Once [[id number]]  (Normal) Collected:  07/14/19 0404    Specimen:  Blood Updated:  07/14/19 0407     Glucose 98 mg/dL     Blood Gas, Arterial [[id number]]  (Abnormal) Collected:  07/14/19 0400    Specimen:  Arterial Blood Updated:  07/14/19 0405     Site Arterial: left brachial     Joaquim's Test N/A     pH, Arterial 7.184 pH units      Comment: Critical:Notify TREE grace (14-Jul-19 04:02:20)Read back ok        pCO2, Arterial 45.1 mm Hg      pO2, Arterial 261.7 mm Hg      HCO3, Arterial 17.0 mmol/L      Base Excess, Arterial -10.8 mmol/L      O2 Saturation Calculated 99.8 %      A-a Gradiant 0.5 mmHg      Barometric Pressure for Blood Gas 752.1 mmHg      Modality Adult Vent     FIO2 80 %      Ventilator Mode AC     Set Tidal Volume 500     Set [facility] 16     Rate 21 Breaths/minute      PEEP 5    Blood Gas, Arterial [[id number]]  (Abnormal) Collected:  07/14/19 0216    Specimen:  Arterial Blood Updated:  07/14/19 0220     Site Arterial: left brachial     Joaquim's Test N/A     pH, Arterial 7.012 pH units      Comment: Critical:Notify TREE ANGEL (14-Jul-19 02:19:06)Read back ok        pCO2, Arterial 69.4 mm Hg      Comment: Critical:Notify TREE ANGEL (14-Jul-19 02:19:06)Read back ok        pO2, Arterial 76.7 mm Hg      HCO3, Arterial 17.6 mmol/L      Base Excess, Arterial -13.6 mmol/L      O2 Saturation Calculated 85.7 %      Barometric Pressure for Blood Gas 752.4 mmHg       Modality Cannula     Flow Rate 4 lpm      Rate 28 Breaths/minute     POC Glucose Once [[id number]]  (Normal) Collected:  07/14/19 0149    Specimen:  Blood Updated:  07/14/19 0150     Glucose 123 mg/dL     POC Glucose Once [[id number]]  (Normal) Collected:  07/13/19 2021    Specimen:  Blood Updated:  07/13/19 2024     Glucose 99 mg/dL     Potassium [[id number]]  (Abnormal) Collected:  07/13/19 1815    Specimen:  Blood from Hand, Left Updated:  07/13/19 1855     Potassium 6.0 mmol/L     Blood Culture - Blood, Arm, Left [[id number]] Collected:  07/10/19 1755    Specimen:  Blood from Arm, Left Updated:  07/13/19 1815     Blood Culture No growth at 3 days    Blood Culture - Blood, Hand, Left [[id number]] Collected:  07/10/19 1755    Specimen:  Blood from Hand, Left Updated:  07/13/19 1815     Blood Culture No growth at 3 days    POC Glucose Once [[id number]]  (Normal) Collected:  07/13/19 1714    Specimen:  Blood Updated:  07/13/19 1716     Glucose 83 mg/dL         Imaging Results (last 24 hours)     Procedure Component Value Units Date/Time    XR Chest 1 View [[id number]] Collected:  07/14/19 0343     Updated:  07/14/19 0348    Narrative:       PORTABLE CHEST X-RAY     CLINICAL HISTORY: central line, ETT; R53.1-Weakness     COMPARISON: 07/10/2019.     FINDINGS: Portable AP view of the chest was obtained with overlying  monitor leads in place. ET tube tube terminates at the level of the mid  thoracic trachea. Right IJ central line terminates in the region of the  SVC. Left-sided pacemaker unchanged. No pneumothorax. Lungs are fairly  well inflated. Persistent left lung base opacity, likely combination of  airspace disease and pleural fluid. Right lung is clear other than  calcified residua of granulomatous disease. There is pulmonary vascular  congestion. Stable marked cardiomegaly.             Impression:       Life support line additions as above, no pneumothorax        This report was finalized on 7/14/2019 3:45 AM by  Genaro Rocha M.D.             Current Facility-Administered Medications:   •  diphenhydrAMINE (BENADRYL) capsule 25 mg, 25 mg, Oral, Q6H PRN **OR** diphenhydrAMINE (BENADRYL) injection 25 mg, 25 mg, Intravenous, Q6H PRN, Aj Lowery MD  •  diphenoxylate-atropine (LOMOTIL) 2.5-0.025 MG per tablet 1 tablet, 1 tablet, Oral, Q2H PRN, Aj Lowery MD  •  Glycerin-Hypromellose- (ARTIFICIAL TEARS) 0.2-0.2-1 % ophthalmic solution solution 1 drop, 1 drop, Both Eyes, Q30 Min PRN, Aj Lowery MD  •  glycopyrrolate (ROBINUL) injection 0.2 mg, 0.2 mg, Intravenous, Q2H PRN **OR** glycopyrrolate (ROBINUL) injection 0.2 mg, 0.2 mg, Subcutaneous, Q2H PRN **OR** glycopyrrolate (ROBINUL) injection 0.4 mg, 0.4 mg, Intravenous, Q2H PRN **OR** glycopyrrolate (ROBINUL) injection 0.4 mg, 0.4 mg, Subcutaneous, Q2H PRN, Aj Lowery MD  •  HYDROmorphone (DILAUDID) injection 0.5 mg, 0.5 mg, Intravenous, Q1H PRN **OR** morphine injection 2 mg, 2 mg, Intravenous, Q1H PRN, 2 mg at 07/14/19 1358 **OR** morphine concentrated solution solution 5 mg, 5 mg, Sublingual, Q1H PRN, Aj Lowery MD  •  HYDROmorphone (DILAUDID) injection 1 mg, 1 mg, Intravenous, Q1H PRN **OR** morphine injection 4 mg, 4 mg, Intravenous, Q1H PRN **OR** morphine concentrated solution solution 10 mg, 10 mg, Sublingual, Q1H PRN, Aj Lowery MD  •  HYDROmorphone (DILAUDID) injection 1.5 mg, 1.5 mg, Intravenous, Q1H PRN **OR** Morphine injection 6 mg, 6 mg, Intravenous, Q1H PRN **OR** morphine concentrated solution solution 20 mg, 20 mg, Sublingual, Q1H PRN, Aj Loewry MD  •  LORazepam (ATIVAN) injection 0.5 mg, 0.5 mg, Intravenous, Q1H PRN **OR** LORazepam (ATIVAN) 2 MG/ML concentrated solution 0.5 mg, 0.5 mg, Sublingual, Q1H PRN, Aj Lowery MD  •  LORazepam (ATIVAN) injection 1 mg, 1 mg, Intravenous, Q1H PRN **OR** LORazepam (ATIVAN) 2 MG/ML concentrated solution 1  mg, 1 mg, Sublingual, Q1H PRN, Aj Lowery MD  •  LORazepam (ATIVAN) injection 2 mg, 2 mg, Intravenous, Q1H PRN **OR** LORazepam (ATIVAN) 2 MG/ML concentrated solution 2 mg, 2 mg, Sublingual, Q1H PRN, Aj Lowery MD  •  promethazine (PHENERGAN) injection 6.25 mg, 6.25 mg, Intravenous, Q4H PRN **OR** promethazine (PHENERGAN) tablet 6.25 mg, 6.25 mg, Oral, Q4H PRN **OR** promethazine (PHENERGAN) 6.25 MG/5ML syrup 6.25 mg, 6.25 mg, Oral, Q4H PRN **OR** promethazine (PHENERGAN) suppository 6.25 mg, 6.25 mg, Rectal, Q4H PRN, Aj Lowery MD  •  Scopolamine (TRANSDERM-SCOP) 1.5 MG/3DAYS patch 1 patch, 1 patch, Transdermal, Q72H PRN, Aj Lowery MD    ASSESSMENT  GI bleed with vomiting blood and blood per rectum  History of colon cancer probably recurrent metastatic disease  Renal mass for last 10 years  Acute kidney injury  Chronic kidney disease stage III  Acute on chronic systolic congestive heart failure  Right lower extremity cellulitis  Chronic atrial fibrillation on Pradaxa  Diabetes mellitus  Hypertension  Hyperlipidemia  COPD  Depression  Elevated liver enzymes probably secondary to congestion  Gastroesophageal reflux disease    PLAN  No further work-up  Comfort care only  Transfer to palliative care unit  Extensively discussed with family    GE WALKER MD

## 2019-07-14 NOTE — PROCEDURES
Ultrasound Guided Central Venous Catheter Insertion Procedure Note      Indications:  vascular access, hypovolemia    Procedure Details   Informed consent was obtained for the procedure, including sedation.  Risks of lung perforation, hemorrhage, arrhythmia, and adverse drug reaction were discussed.     Maximum sterile technique was used including usual patient drapes, antiseptics and physician sterile garments.    Under sterile conditions the skin above the on the right internal jugular vein was prepped with chlorhexidine and covered with a sterile drape. A sterile ultrasound probe was used to localize the target vein. It was clearly visualized. Local anesthesia was applied to the skin and subcutaneous tissues with lidocaine 1%. An 18-gauge needle was then inserted into the vein under ultrasound guidance. A guide wire was then threaded into the vein. A quad lumen central venous catheter was then inserted into the vessel over the guide wire. The catheter was sutured into place and dressed following sterile protocol with Biopatch placed. All 4 ports were flushed and deemed patent.    Findings:  There were no changes to vital signs. All catheter ports were flushed with saline. Patient did tolerate procedure well.    Recommendations:  CXR ordered to verify placement.     Emil Whiting MD  7/14/2019

## 2019-07-14 NOTE — NURSING NOTE
Pt c/o hemorrhoid pain multiple times.  Ordered Tuck's pads.  When went to place pads, noted open spot in gluteal fold/coccyx.  Also small amount of blood coming from rectum.  Pt tells this RN that she had open spot on tail bone when she came in, but there is no note of this or picture taken.  Barrier cream applied.  Tucks pads applied.  Note left for Dr Kenisha ellington:hemorrhoids and rectal bleeding.  Accumax already in use.  Waffle cushion also placed under pt.

## 2019-07-14 NOTE — CONSULTS
Patient and family wanted prayer. They believed that she is going to pass away. I prayed for them and her. She was able to blink and respond with her eyes to the questions that I had about what to pray for.

## 2019-07-15 ENCOUNTER — APPOINTMENT (OUTPATIENT)
Dept: ONCOLOGY | Facility: HOSPITAL | Age: 79
End: 2019-07-15

## 2019-07-15 ENCOUNTER — APPOINTMENT (OUTPATIENT)
Dept: LAB | Facility: HOSPITAL | Age: 79
End: 2019-07-15

## 2019-07-15 LAB
ABO + RH BLD: NORMAL
ABO + RH BLD: NORMAL
BACTERIA SPEC AEROBE CULT: NORMAL
BACTERIA SPEC AEROBE CULT: NORMAL
BH BB BLOOD EXPIRATION DATE: NORMAL
BH BB BLOOD EXPIRATION DATE: NORMAL
BH BB BLOOD TYPE BARCODE: 6200
BH BB BLOOD TYPE BARCODE: 6200
BH BB DISPENSE STATUS: NORMAL
BH BB DISPENSE STATUS: NORMAL
BH BB PRODUCT CODE: NORMAL
BH BB PRODUCT CODE: NORMAL
BH BB UNIT NUMBER: NORMAL
BH BB UNIT NUMBER: NORMAL
UNIT  ABO: NORMAL
UNIT  ABO: NORMAL
UNIT  RH: NORMAL
UNIT  RH: NORMAL

## 2019-07-15 NOTE — PROGRESS NOTES
Discharge Planning Assessment  [facility]     Patient Name: Renuka Patel  MRN: [id number]  Today's Date: 7/15/2019    Admit Date: 7/10/2019    Discharge Needs Assessment    No documentation.       Discharge Plan     Row Name 07/15/19 1902       Plan    Plan Comments  The patient transferred [facility] on 19 @ 13:54. The patient was palliative. The patient  on 19 @ 14:50. ERIC Rendon RN, CCP.     Final Discharge Disposition Code  20 -     Final Note  The patient  on 19 @ 14:50. ERIC Rendon RN, CCP.         Destination - Selection Complete      Service Provider Request Status Selected Services Address Phone Number Fax Number    [facility] Selected Skilled Nursing [address] [address] [phone]    [facility] Accepted N/A [address] [address] [phone]      Durable Medical Equipment      No service coordination in this encounter.      Dialysis/Infusion      No service coordination in this encounter.      Home Medical Care      Service Provider Request Status Selected Services Address Phone Number Fax Number    [facility] [address] [address] [phone]      Therapy      No service coordination in this encounter.      Community Resources      No service coordination in this encounter.        Expected Discharge Date and Time     Expected Discharge Date Expected Discharge Time    2019         Demographic Summary    No documentation.       Functional Status    No documentation.       Psychosocial    No documentation.       Abuse/Neglect    No documentation.       Legal    No documentation.       Substance Abuse    No documentation.       Patient Forms    No documentation.           Kathrine Rendon RN

## 2019-07-15 NOTE — PROGRESS NOTES
Case Management Discharge Note    Final Note: The patient  on 19 @ 14:50. ERIC Rendon RN, CCP.     Destination - Selection Complete      Service Provider Request Status Selected Services Address Phone Number Fax Number    [facility] Selected Skilled Nursing [address] [place] [address] [phone]      Durable Medical Equipment      No service has been selected for the patient.      Dialysis/Infusion      No service has been selected for the patient.      Home Medical Care      No service has been selected for the patient.      Therapy      No service has been selected for the patient.      Community Resources      No service has been selected for the patient.             Final Discharge Disposition Code: 20 -

## 2019-07-16 NOTE — DISCHARGE SUMMARY
Discharge summary    Date of admission 7/10/2019  Date of death 2019    Discussion  79-year-old white female with very complex past medical history including COPD congestive heart failure coronary artery disease chronic atrial fibrillation diabetes mellitus hypertension hyperlipidemia obstructive sleep apnea who also has history of breast cancer colon cancer she was also found in to our service from primary care doctor office with leg swelling generalized weakness nausea and work-up revealed acute kidney injury.  Patient admitted and found to be in acute on chronic systolic congestive heart failure and also acute kidney injury on top of chronic kidney disease.  Patient started on IV diuretics and nephrology consult obtained.  Patient also followed by cardiology.  Patient required Bumex drip which is changed by nephrology.  Patient was responding slowly and was also treated for right leg cellulitis.  Patient continued to have abdominal pain and developed hypotension bloody stools and become unresponsive and coded transferred to the ICU and work-up with CT abdominal pelvis showed metastatic disease to the liver.  Patient followed by gastroenterology.  Patient family decided to withdraw the care including her son who is a pharmacist and transfer patient to palliative care unit for comfort care.  Patient was in hemorrhagic shock with acute hypercapnia and hypoxic respiratory failure and was intubated and required mechanical ventilation but was taken off after family decided no further supportive care after withdrawing care patient transferred to palliative care unit where she remained comfortable and  in her body released with family.  Patient was made DNR and then allow natural death per patient and family request.    Cause of death cardiopulmonary failure    GE WALKER MD

## 2019-07-18 LAB
ABO + RH BLD: NORMAL
ABO + RH BLD: NORMAL
BH BB BLOOD EXPIRATION DATE: NORMAL
BH BB BLOOD EXPIRATION DATE: NORMAL
BH BB BLOOD TYPE BARCODE: 6200
BH BB BLOOD TYPE BARCODE: 6200
BH BB DISPENSE STATUS: NORMAL
BH BB DISPENSE STATUS: NORMAL
BH BB PRODUCT CODE: NORMAL
BH BB PRODUCT CODE: NORMAL
BH BB UNIT NUMBER: NORMAL
BH BB UNIT NUMBER: NORMAL
UNIT  ABO: NORMAL
UNIT  ABO: NORMAL
UNIT  RH: NORMAL
UNIT  RH: NORMAL

## 2019-11-15 NOTE — MR AVS SNAPSHOT
Renuka Patel   1/4/2017 11:20 AM   Office Visit    Dept Phone:  [phone]   Encounter #:  [id number]    Provider:  REGAN Conway   Department:  [facility] ENDOCRINOLOGY                Your Full Care Plan              Today's Medication Changes          These changes are accurate as of: 1/4/17 12:07 PM.  If you have any questions, ask your nurse or doctor.               Medication(s)that have changed:     Insulin Glargine 300 UNIT/ML solution pen-injector   Inject 20 Units under the skin Daily.   What changed:    - when to take this  - reasons to take this   Changed by:  REGAN Conway       magnesium oxide 400 MG tablet   Commonly known as:  MAG-OX   Take 400 mg by mouth 2 (Two) Times a Day.   What changed:  Another medication with the same name was removed. Continue taking this medication, and follow the directions you see here.   Changed by:  REGAN Conway       omeprazole 40 MG capsule   Commonly known as:  priLOSEC   Take 40 mg by mouth Daily.   What changed:  Another medication with the same name was removed. Continue taking this medication, and follow the directions you see here.   Changed by:  REGAN Conway         Stop taking medication(s)listed here:     fluticasone 50 MCG/ACT nasal spray   Commonly known as:  FLONASE   Stopped by:  REGAN Conway                Where to Get Your Medications      You can get these medications from any pharmacy     Bring a paper prescription for each of these medications     Insulin Glargine 300 UNIT/ML solution pen-injector                  Your Updated Medication List          This list is accurate as of: 1/4/17 12:07 PM.  Always use your most recent med list.                acetaminophen 325 MG tablet   Commonly known as:  TYLENOL   Take 2 tablets by mouth every 6 (six) hours as needed for mild pain (1-3) or fever.       ADVAIR DISKUS 250-50 MCG/DOSE DISKUS   Generic drug:  fluticasone-salmeterol          allopurinol 300 MG tablet   Commonly known as:  ZYLOPRIM       ALPRAZolam 0.5 MG tablet   Commonly known as:  XANAX       atorvastatin 20 MG tablet   Commonly known as:  LIPITOR   Take 1 tablet by mouth Daily.       carvedilol 25 MG tablet   Commonly known as:  COREG   Take 1/2 tablet twice daily       colchicine 0.6 MG tablet       dabigatran etexilate 150 MG capsu   Commonly known as:  PRADAXA   Take 1 capsule by mouth 2 (Two) Times a Day. Lot # [id number]   Exp 5/30/2018       furosemide 20 MG tablet   Commonly known as:  LASIX   Take 1 tablet by mouth 3 (Three) Times a Day.       glimepiride 4 MG tablet   Commonly known as:  AMARYL   Take 1 tablet by mouth 2 (two) times a day.       guaiFENesin 600 MG 12 hr tablet   Commonly known as:  MUCINEX   Take 2 tablets by mouth 2 (two) times a day.       Insulin Glargine 300 UNIT/ML solution pen-injector   Inject 20 Units under the skin Daily.       Insulin Pen Needle 32G X 4 MM misc       magnesium oxide 400 MG tablet   Commonly known as:  MAG-OX       Melatonin 10 MG capsule       nitroglycerin 0.4 MG SL tablet   Commonly known as:  NITROSTAT       omeprazole 40 MG capsule   Commonly known as:  priLOSEC       PROAIR RESPICLICK 108 (90 BASE) MCG/ACT inhaler   Generic drug:  albuterol       tolnaftate 1 % powder   Commonly known as:  TINACTIN       traZODone 100 MG tablet   Commonly known as:  DESYREL       vitamin D 63607 UNITS capsule capsule   Commonly known as:  ERGOCALCIFEROL   Take 1 capsule by mouth 1 (one) time per week.               You Were Diagnosed With        Codes Comments    Type 2 diabetes mellitus with stage 3 chronic kidney disease, with long-term current use of insulin    -  Primary ICD-10-CM: E11.22, N18.3, Z79.4  ICD-9-CM: 250.40, 585.3, V58.67     Hyperlipidemia, unspecified hyperlipidemia type     ICD-10-CM: E78.5  ICD-9-CM: 272.4     Morbid obesity with BMI of 40.0-44.9, adult     ICD-10-CM: E66.01, Z68.41  ICD-9-CM: 278.01, V85.41        Instructions    toujeo 20 units once daily  Continue all other meds.     Patient Instructions History      Upcoming Appointments     Visit Type Date Time Department    OFFICE VISIT 2017 11:20 AM [facility]    PACEMAKER ICD - REMOTE 2017 12:00 AM [facility] [place]    LAB 2017 10:20 AM [facility]    FOLLOW UP 1 UNIT 2017 11:00 AM [facility]    INJECTION - 15 2017 11:45 AM [facility] OP INFU [facility]    FOLLOW UP 3/21/2017 10:40 AM [facility] [place]    PACEMAKER ICD - IN OFFICE 3/21/2017 10:15 AM [facility] [place]    [facility] 3/28/2017 10:40 AM [facility]    OFFICE VISIT 2017 10:40 AM [facility]      [employer] Signup     [facility] allows you to send messages to your doctor, view your test results, renew your prescriptions, schedule appointments, and more. To sign up, go to [employer] and click on the Sign Up Now link in the New User? box. Enter your [employer] Activation Code exactly as it appears below along with the last four digits of your Social Security Number and your Date of Birth () to complete the sign-up process. If you do not sign up before the expiration date, you must request a new code.    [employer] Activation Code: [email or web address]  Expires: 2017 12:06 PM    If you have questions, you can email [email or web address] or call [phone] to talk to our [employer] staff. Remember, [employer] is NOT to be used for urgent needs. For medical emergencies, dial 911.               Other Info from Your Visit           Your Appointments     2017 12:00 AM EST   PACEMAKER - REMOTE with FINESSE CAIN   [facility] [place] [facility]IOLOGY (--)    [address]  [facility] [address]   [phone]            2017 10:20 AM EST   Lab with LAB CHAIR [facility] ([place]    [address] [address]  "  [phone]            Feb 09, 2017 11:00 AM EST   FOLLOW UP with Manuel Hart II, MD   [facility] IN BLOOD DISORDERS AND CANCER ([facility] [place])    [address] [facility] [address]  [facility] [address]   [phone]            Feb 09, 2017 11:45 AM EST   INJECTION with INJECTION CHAIR [facility] INFUSION CBC ([place])    [address] [facility] [address]  [facility] [address]   [phone]            Mar 21, 2017 10:40 AM EDT   Follow Up with Rhiannon Rios MD   [facility] CARDIOLOGY (--)    [address]  [facility] [address] [phone]           Arrive 15 minutes prior to appointment.              Allergies     Codeine  Itching    Hydralazine      Hydralazine Hcl      Lisinopril  Cough      Reason for Visit     Diabetes recent labs, did not bring meter, testing BG 4 to 5 times daily    Hyperlipidemia patient is taking Toujeo once or twice a week    Hypertension     Vitamin D Deficiency           Vital Signs     Blood Pressure Height Weight Body Mass Index Smoking Status       130/72 64\" (162.6 cm) 231 lb (105 kg) 39.65 kg/m2 Former Smoker       Problems and Diagnoses Noted     High cholesterol or triglycerides    Morbid obesity with BMI of 40.0-44.9, adult    Type 2 diabetes with stage 3 chronic kidney disease GFR 30-59      No Longer an Issue     Diabetes mellitus type 2, controlled, without complications        " no

## 2021-03-29 NOTE — PROGRESS NOTES
"Subjective   Renuka Patel is a 76 y.o. female seen for follow up for DM2, hyperlipidemia, HTN, vit d deficiency, lab review. Patient is taking 40 units of Toujeo once daily if AM BG is over 180. She is having hypoglycemic episodes of 49. Her PCP took her off of vitamin d. She is checking her BG 4-6 times a day. No BG readings.     History of Present Illness this is a 76-year-old female known patient with type II diabetes, hypertension, eye.  Uricemia and dyslipidemia as well as vitamin D deficiency.  Over the course of last 6 months she has had no significant health problems for which to go to the emergency room or hospital.  /82  Resp 16  Ht 64.5\" (163.8 cm)  Wt 236 lb 9.6 oz (107 kg)  BMI 39.99 kg/m2  Allergies   Allergen Reactions   • Codeine Itching     Edema   • Hydralazine Hcl      BP drops.   • Lisinopril Cough     Edema       Current Outpatient Prescriptions:   •  ADVAIR DISKUS 250-50 MCG/DOSE DISKUS, Inhale 1 puff 2 (two) times a day., Disp: , Rfl:   •  allopurinol (ZYLOPRIM) 300 MG tablet, Take 1 tablet by mouth Daily., Disp: , Rfl: 2  •  ALPRAZolam (XANAX) 0.5 MG tablet, Take 1 tablet by mouth as needed. Takes 1-2 tablets, Disp: , Rfl:   •  atorvastatin (LIPITOR) 20 MG tablet, Take 1 tablet by mouth Daily., Disp: 90 tablet, Rfl: 3  •  carvedilol (COREG) 25 MG tablet, Take 1/2 tablet twice a day.Take an extra 1/2 when needed., Disp: 135 tablet, Rfl: 3  •  colchicine 0.6 MG tablet, Take 1 tablet by mouth As Needed., Disp: , Rfl: 0  •  dabigatran etexilate (PRADAXA) 150 MG capsu, Take 1 capsule by mouth 2 (Two) Times a Day. Lot # [id number]   Exp 5/30/2018, Disp: 60 capsule, Rfl: 0  •  furosemide (LASIX) 20 MG tablet, TAKE 2 TABLETS BY MOUTH AM AND 3 BY MOUTH PM DAILY., Disp: 270 tablet, Rfl: 1  •  glimepiride (AMARYL) 4 MG tablet, Take 1 tablet by mouth 2 (two) times a day., Disp: 180 tablet, Rfl: 3  •  Insulin Glargine 300 UNIT/ML solution pen-injector, Inject 20 Units under the skin Daily. (Patient " taking differently: Inject 40 Units under the skin Daily.), Disp: 12 pen, Rfl: 3  •  Insulin Pen Needle 32G X 4 MM misc, , Disp: , Rfl:   •  magnesium oxide (MAG-OX) 400 MG tablet, Take 400 mg by mouth 2 (Two) Times a Day., Disp: , Rfl: 11  •  Melatonin 10 MG capsule, Take 1 capsule by mouth every night., Disp: , Rfl:   •  Multiple Vitamins-Minerals (MULTIVITAMIN WOMEN 50+ PO), QD, Disp: , Rfl:   •  nitroglycerin (NITROSTAT) 0.4 MG SL tablet, Place 1 tablet under the tongue Every 5 (Five) Minutes As Needed for chest pain., Disp: 20 tablet, Rfl: 0  •  omeprazole (priLOSEC) 40 MG capsule, Take 40 mg by mouth Daily., Disp: , Rfl: 1  •  PROAIR RESPICLICK 108 (90 BASE) MCG/ACT inhaler, Every 6 (Six) Hours As Needed., Disp: , Rfl:   •  tolnaftate (TINACTIN) 1 % powder, APPLY TO AFFECTED AREA TWICE DAILY as needed, Disp: , Rfl: 0      The following portions of the patient's history were reviewed and updated as appropriate: allergies, current medications, past family history, past medical history, past social history, past surgical history and problem list.    Review of Systems   Constitutional: Negative.    HENT: Negative.    Eyes: Negative.    Respiratory: Negative.    Cardiovascular: Negative.    Gastrointestinal: Negative.    Endocrine: Negative.    Genitourinary: Negative.    Musculoskeletal: Negative.    Skin: Negative.    Allergic/Immunologic: Negative.    Neurological: Negative.    Hematological: Negative.    Psychiatric/Behavioral: Negative.        Objective   Physical Exam   Constitutional: She is oriented to person, place, and time. She appears well-developed and well-nourished. No distress.   HENT:   Head: Normocephalic and atraumatic.   Right Ear: External ear normal.   Left Ear: External ear normal.   Nose: Nose normal.   Mouth/Throat: Oropharynx is clear and moist. No oropharyngeal exudate.   Eyes: Conjunctivae and EOM are normal. Pupils are equal, round, and reactive to light. Right eye exhibits no  discharge. Left eye exhibits no discharge. No scleral icterus.   Neck: Normal range of motion. Neck supple. No JVD present. No tracheal deviation present. No thyromegaly present.   Cardiovascular: Normal rate, regular rhythm, normal heart sounds and intact distal pulses.  Exam reveals no gallop and no friction rub.    No murmur heard.  Pulmonary/Chest: Effort normal and breath sounds normal. No stridor. No respiratory distress. She has no wheezes. She has no rales. She exhibits no tenderness.   Abdominal: Soft. Bowel sounds are normal. She exhibits no distension and no mass. There is no tenderness. There is no rebound and no guarding. No hernia.   Musculoskeletal: Normal range of motion. She exhibits no edema, tenderness or deformity.   Lymphadenopathy:     She has no cervical adenopathy.   Neurological: She is alert and oriented to person, place, and time. She has normal reflexes. She displays normal reflexes. No cranial nerve deficit. She exhibits normal muscle tone. Coordination normal.   Skin: Skin is warm and dry. No rash noted. She is not diaphoretic. No erythema. No pallor.   Psychiatric: She has a normal mood and affect. Her behavior is normal. Judgment and thought content normal.   Nursing note and vitals reviewed.     Results for orders placed or performed in visit on 03/28/17   Comprehensive Metabolic Panel   Result Value Ref Range    Glucose 235 (H) 65 - 99 mg/dL    BUN 38 (H) 8 - 23 mg/dL    Creatinine 1.49 (H) 0.57 - 1.00 mg/dL    eGFR Non African Am 34 (L) >60 mL/min/1.73    eGFR  Am 41 (L) >60 mL/min/1.73    BUN/Creatinine Ratio 25.5 (H) 7.0 - 25.0    Sodium 140 136 - 145 mmol/L    Potassium 4.6 3.5 - 5.2 mmol/L    Chloride 95 (L) 98 - 107 mmol/L    Total CO2 29.8 (H) 22.0 - 29.0 mmol/L    Calcium 9.3 8.6 - 10.5 mg/dL    Total Protein 6.5 6.0 - 8.5 g/dL    Albumin 4.30 3.50 - 5.20 g/dL    Globulin 2.2 gm/dL    A/G Ratio 2.0 g/dL    Total Bilirubin 0.3 0.1 - 1.2 mg/dL    Alkaline Phosphatase  108 39 - 117 U/L    AST (SGOT) 17 1 - 32 U/L    ALT (SGPT) 11 1 - 33 U/L   C-Peptide   Result Value Ref Range    C-Peptide 4.4 1.1 - 4.4 ng/mL   Hemoglobin A1c   Result Value Ref Range    Hemoglobin A1C 7.94 (H) 4.80 - 5.60 %   Lipid Panel   Result Value Ref Range    Total Cholesterol 136 0 - 200 mg/dL    Triglycerides 165 (H) 0 - 150 mg/dL    HDL Cholesterol 49 40 - 60 mg/dL    VLDL Cholesterol 33 5 - 40 mg/dL    LDL Cholesterol  54 0 - 100 mg/dL   Uric Acid   Result Value Ref Range    Uric Acid 4.6 2.4 - 5.7 mg/dL   MicroAlbumin, Urine, Random   Result Value Ref Range    Microalbumin, Urine 21.8 Not Estab. ug/mL           Assessment/Plan   Diagnoses and all orders for this visit:    Chronic coronary artery disease  -     T4 & TSH ([facility]); Future  -     Uric Acid; Future  -     Vitamin D 25 Hydroxy; Future  -     Comprehensive Metabolic Panel; Future  -     C-Peptide; Future  -     Hemoglobin A1c; Future  -     Lipid Panel; Future  -     MicroAlbumin, Urine, Random; Future    Hyperlipidemia, unspecified hyperlipidemia type  -     T4 & TSH ([facility]); Future  -     Uric Acid; Future  -     Vitamin D 25 Hydroxy; Future  -     Comprehensive Metabolic Panel; Future  -     C-Peptide; Future  -     Hemoglobin A1c; Future  -     Lipid Panel; Future  -     MicroAlbumin, Urine, Random; Future    Essential hypertension  -     T4 & TSH ([facility]); Future  -     Uric Acid; Future  -     Vitamin D 25 Hydroxy; Future  -     Comprehensive Metabolic Panel; Future  -     C-Peptide; Future  -     Hemoglobin A1c; Future  -     Lipid Panel; Future  -     MicroAlbumin, Urine, Random; Future    Vitamin D deficiency  -     T4 & TSH ([facility]); Future  -     Uric Acid; Future  -     Vitamin D 25 Hydroxy; Future  -     Comprehensive Metabolic Panel; Future  -     C-Peptide; Future  -     Hemoglobin A1c; Future  -     Lipid Panel; Future  -     MicroAlbumin, Urine, Random; Future    Morbid obesity with BMI of 40.0-44.9, adult  -     T4 & TSH  ([facility]); Future  -     Uric Acid; Future  -     Vitamin D 25 Hydroxy; Future  -     Comprehensive Metabolic Panel; Future  -     C-Peptide; Future  -     Hemoglobin A1c; Future  -     Lipid Panel; Future  -     MicroAlbumin, Urine, Random; Future    Uncontrolled type 2 diabetes mellitus with complication, with long-term current use of insulin  -     T4 & TSH ([facility]); Future  -     Uric Acid; Future  -     Vitamin D 25 Hydroxy; Future  -     Comprehensive Metabolic Panel; Future  -     C-Peptide; Future  -     Hemoglobin A1c; Future  -     Lipid Panel; Future  -     MicroAlbumin, Urine, Random; Future    Other orders  -     Insulin Glargine 300 UNIT/ML solution pen-injector; Inject 40 Units under the skin Daily.  -     linagliptin (TRADJENTA) 5 MG tablet tablet; Take 1 tablet by mouth Daily.               In summary I saw and examined this 76-year-old female for above-mentioned problems.  I reviewed her laboratory evaluation of 03/28/2017 and provided her with a hard copy of it.  Her hemoglobin A1c is somewhat elevated at 7.94.  She is otherwise clinically and metabolically stable and therefore I ask her to stay with Toujeo 40 units every morning instead of fluctuating the dose additionally I will stop glimepiride and will start her on Tradjenta 5 mg daily.  She will see Ms. Mary Ann Shell in 4 months or sooner if needed with laboratory evaluation prior to each office visit.   Detail Level: Zone Detail Level: Generalized Detail Level: Detailed

## 2023-01-31 NOTE — PROGRESS NOTES
"Subjective   Renuka Patel is a 79 y.o. female is here today for follow-up.  Chief Complaint   Patient presents with   • Diabetes     recent labs, testing BG 5 times daily, pt did not bring meter   • Hyperlipidemia   • Hypertension   • Vitamin D Deficiency     /74   Ht 162.6 cm (64\")   Wt 117 kg (258 lb)   BMI 44.29 kg/m²   Current Outpatient Medications on File Prior to Visit   Medication Sig   • albuterol (PROVENTIL) (2.5 MG/3ML) 0.083% nebulizer solution INHALE THE CONTENTS OF ONE VIAL (3ML) EVERY 4 HOURS AS NEEDED   • allopurinol (ZYLOPRIM) 300 MG tablet Take 1 tablet by mouth Daily.   • atorvastatin (LIPITOR) 20 MG tablet Take 1 tablet by mouth Daily.   • bumetanide (BUMEX) 1 MG tablet TAKE ONE TABLET TWICE DAILY IF EDEMA WORSENS TAKE TWO TABLETS IN THE MORNING AND TAKE ONE TABLET IN THE EVENING   • carvedilol (COREG) 25 MG tablet TAKE ONE TABLET IN THE MORNING, 1/2 TABLET AT NOON AND 1/2 TABLET AT BEDTIME   • dabigatran etexilate (PRADAXA) 150 MG capsu Take 1 capsule by mouth Every 12 (Twelve) Hours.   • diphenhydrAMINE (BENADRYL) 25 MG tablet Take 25 mg by mouth Every 6 (Six) Hours As Needed for Itching.   • fluticasone (FLONASE) 50 MCG/ACT nasal spray 2 sprays into each nostril Daily.   • Fluticasone-Umeclidin-Vilant (TRELEGY ELLIPTA IN) Inhale Daily.   • GLOBAL EASE INJECT PEN NEEDLES 32G X 4 MM misc USE AS DIRECTED   • Insulin Glargine 300 UNIT/ML solution pen-injector Inject 58 Units under the skin into the appropriate area as directed Daily.   • ipratropium-albuterol (DUO-NEB) 0.5-2.5 mg/mL nebulizer INHALE THE contents OF ONE vial using nebulizer FOUR TIMES DAILY AS NEEDED   • levocetirizine (XYZAL) 5 MG tablet Take 5 mg by mouth Every Evening.   • Melatonin 10 MG capsule Take 1 capsule by mouth every night.   • nitroglycerin (NITROSTAT) 0.4 MG SL tablet PLACE ONE TABLET UNDER TONGUE FOR CHEST PAIN.  MAY REPEAT EVERY 5 MINUTES FOR 3  DOSES   • omeprazole (priLOSEC) 40 MG capsule Take 40 mg by " negative mouth Daily.   • potassium chloride (K-DUR,KLOR-CON) 20 MEQ CR tablet Take 1 tablet by mouth Daily.   • TRADJENTA 5 MG tablet tablet Take 1 tablet by mouth Daily.   • [DISCONTINUED] furosemide (LASIX) 40 MG tablet TAKE 1 TABLET BY MOUTH AM AND 1 TABLET MID DAY AND 1/2 TABLET PM (Patient taking differently: Take 40 mg by mouth 3 (Three) Times a Day.)     No current facility-administered medications on file prior to visit.      Family History   Problem Relation Age of Onset   • Breast cancer Mother 60   • Breast cancer Sister 50   • Arrhythmia Sister    • Hypertension Sister    • Brain cancer Father 38   • Breast cancer Sister 40   • Heart attack Brother    • Hypertension Brother    • Diabetes Son    • Hypertension Son    • Heart disease Maternal Grandmother      Social History     Tobacco Use   • Smoking status: Former Smoker     Packs/day: 1.00     Years: 25.00     Pack years: 25.00     Last attempt to quit: 2004     Years since quitting: 15.3   • Smokeless tobacco: Never Used   • Tobacco comment: caffeine use   Substance Use Topics   • Alcohol use: Yes     Comment: Rare   • Drug use: No     Allergies   Allergen Reactions   • Codeine Itching     Edema   • Hydralazine Hcl      BP drops.   • Lisinopril Cough     Edema         History of Present Illness  Encounter Diagnoses   Name Primary?   • Mixed hyperlipidemia    • Morbid obesity with BMI of 40.0-44.9, adult (CMS/[facility])    • Vitamin D deficiency    • Uncontrolled type 2 diabetes mellitus with complication, with long-term current use of insulin (CMS/[facility]) Yes   79-year-old female patient here today for routine follow-up visit.  She has been seen for the above-mentioned problems.  She is accompanied by a family member.  She is on oxygen continuously.  She uses a walker for mobility.  She is following with hematology for anemia.  She states she is possibly go to be starting Procrit injections.  She is checking her blood sugars 5 times daily however she did not bring her  blood glucose meter.  She reports her blood sugars are mostly less than 140 mg/dL even if she checks after a meal.  She has had one hypoglycemic event in the middle the night in the 50s which woke her up.  We reviewed treatment for hypoglycemia using the role of 15.  She currently uses glucose tablets to treat.  She feels that what caused the low blood sugar that woke her up in the middle the night was that she ate less than usual the day before.  She feels she is doing good on her current insulin regimen of Toujeo.  She is requesting samples and states she has plans to apply for patient assistance for this medication.  She is also requesting samples of Tradjenta due to cost of $200 per month for this medication.    The following portions of the patient's history were reviewed and updated as appropriate: allergies, current medications, past family history, past medical history, past social history, past surgical history and problem list.    Review of Systems   Constitutional: Negative for fatigue.   HENT: Negative for trouble swallowing.    Eyes: Negative for visual disturbance.   Cardiovascular: Negative for leg swelling.   Endocrine: Negative for polyuria.   Skin: Negative for wound.   Neurological: Negative for numbness.       Objective   Physical Exam   Constitutional: She is oriented to person, place, and time. She appears well-developed and well-nourished. No distress.   HENT:   Head: Normocephalic and atraumatic.   Right Ear: External ear normal.   Left Ear: External ear normal.   Nose: Nose normal.   Mouth/Throat: Oropharynx is clear and moist. No oropharyngeal exudate.   Eyes: Conjunctivae and EOM are normal. Pupils are equal, round, and reactive to light. Right eye exhibits no discharge. Left eye exhibits no discharge. No scleral icterus.   Neck: Normal range of motion. Neck supple. No JVD present. No tracheal deviation present. No thyromegaly present.   Cardiovascular: Normal rate, regular rhythm, normal  heart sounds and intact distal pulses. Exam reveals no gallop and no friction rub.   No murmur heard.  Hx of chf   Pulmonary/Chest: Effort normal and breath sounds normal. No stridor. No respiratory distress. She has no wheezes. She has no rales. She exhibits no tenderness.   Abdominal: Soft. Bowel sounds are normal. She exhibits no distension and no mass. There is no tenderness. There is no rebound and no guarding. No hernia.   Musculoskeletal: Normal range of motion. She exhibits edema. She exhibits no tenderness or deformity.   1 + edema in lower ext   Lymphadenopathy:     She has no cervical adenopathy.   Neurological: She is alert and oriented to person, place, and time. She has normal reflexes. She displays normal reflexes. No cranial nerve deficit or sensory deficit. She exhibits normal muscle tone. Coordination normal.   Skin: Skin is warm and dry. No rash noted. She is not diaphoretic. No erythema. No pallor.   Psychiatric: She has a normal mood and affect. Her behavior is normal. Judgment and thought content normal.   Nursing note and vitals reviewed.    Results for orders placed or performed in visit on 05/02/19   Hemoglobin A1c   Result Value Ref Range    Hemoglobin A1C 7.60 (H) 4.80 - 5.60 %   Vitamin D 25 Hydroxy   Result Value Ref Range    25 Hydroxy, Vitamin D 42.7 30.0 - 100.0 ng/ml   C-Peptide   Result Value Ref Range    C-Peptide 0.9 (L) 1.1 - 4.4 ng/mL   Lipid Panel   Result Value Ref Range    Total Cholesterol 102 0 - 200 mg/dL    Triglycerides 89 0 - 150 mg/dL    HDL Cholesterol 55 40 - 60 mg/dL    VLDL Cholesterol 17.8 mg/dL    LDL Cholesterol  29 0 - 100 mg/dL   Comprehensive Metabolic Panel   Result Value Ref Range    Glucose 116 (H) 65 - 99 mg/dL    BUN 28 (H) 8 - 23 mg/dL    Creatinine 1.13 (H) 0.57 - 1.00 mg/dL    eGFR Non African Am 46 (L) >60 mL/min/1.73    eGFR African Am 56 (L) >60 mL/min/1.73    BUN/Creatinine Ratio 24.8 7.0 - 25.0    Sodium 141 136 - 145 mmol/L    Potassium 5.0 3.5  - 5.2 mmol/L    Chloride 98 98 - 107 mmol/L    Total CO2 33.7 (H) 22.0 - 29.0 mmol/L    Calcium 8.7 8.6 - 10.5 mg/dL    Total Protein 6.3 6.0 - 8.5 g/dL    Albumin 3.80 3.50 - 5.20 g/dL    Globulin 2.5 gm/dL    A/G Ratio 1.5 g/dL    Total Bilirubin 0.4 0.2 - 1.2 mg/dL    Alkaline Phosphatase 158 (H) 39 - 117 U/L    AST (SGOT) 26 1 - 32 U/L    ALT (SGPT) 17 1 - 33 U/L   Cardiovascular Risk Assessment   Result Value Ref Range    Interpretation Note    Diabetes Patient Education   Result Value Ref Range    PDF Image Not applicable          Assessment/Plan   Problems Addressed this Visit        Cardiovascular and Mediastinum    Hyperlipidemia       Digestive    Vitamin D deficiency    Morbid obesity with BMI of 40.0-44.9, adult (CMS/[facility])       Endocrine    Uncontrolled type 2 diabetes mellitus with complication, with long-term current use of insulin (CMS/[facility]) - Primary        In summary, patient was seen and examined.  Metabolically clinically she presents stable.  Her hemoglobin A1c has improved significantly since her last visit.  She does have a history of anemia however and is following with hematology.  Her blood pressure is in satisfactory range.  Her cholesterol is controlled.  She did not bring her blood glucose meter has been reminded to bring her blood glucose meter to each visit.  She has had one hypoglycemic event due to not eating enough food.  She will continue on her same current medications as prescribed.  She plans on applying for patient assistance for cost of medications.  She will follow-up with Dr. Balbuena her next visit has requested that she be seen in 4 months.  She will have labs done prior to that visit.            Smoking Cessation Smoking Cessation/Stroke (includes: TIA/SAH/ICH/Ischemic Stroke)